# Patient Record
Sex: FEMALE | Race: WHITE | NOT HISPANIC OR LATINO | Employment: OTHER | URBAN - METROPOLITAN AREA
[De-identification: names, ages, dates, MRNs, and addresses within clinical notes are randomized per-mention and may not be internally consistent; named-entity substitution may affect disease eponyms.]

---

## 2018-07-17 ENCOUNTER — OFFICE VISIT (OUTPATIENT)
Dept: OBSTETRICS AND GYNECOLOGY | Facility: CLINIC | Age: 68
End: 2018-07-17

## 2018-07-17 VITALS
DIASTOLIC BLOOD PRESSURE: 85 MMHG | BODY MASS INDEX: 37.54 KG/M2 | SYSTOLIC BLOOD PRESSURE: 159 MMHG | HEIGHT: 62 IN | WEIGHT: 204 LBS | HEART RATE: 72 BPM

## 2018-07-17 DIAGNOSIS — N95.2 ATROPHIC VAGINITIS: ICD-10-CM

## 2018-07-17 DIAGNOSIS — N39.41 URGE INCONTINENCE: ICD-10-CM

## 2018-07-17 DIAGNOSIS — N81.10 PELVIC ORGAN PROLAPSE QUANTIFICATION STAGE 3 CYSTOCELE: Primary | ICD-10-CM

## 2018-07-17 DIAGNOSIS — N81.4 UTERINE PROLAPSE: ICD-10-CM

## 2018-07-17 DIAGNOSIS — N81.6 PELVIC ORGAN PROLAPSE QUANTIFICATION STAGE 1 RECTOCELE: ICD-10-CM

## 2018-07-17 PROCEDURE — 99203 OFFICE O/P NEW LOW 30 MIN: CPT | Performed by: OBSTETRICS & GYNECOLOGY

## 2018-07-17 RX ORDER — AMLODIPINE BESYLATE 5 MG/1
TABLET ORAL
COMMUNITY
Start: 2018-07-05 | End: 2021-08-05 | Stop reason: SDUPTHER

## 2018-07-17 RX ORDER — GLIPIZIDE 10 MG/1
TABLET, FILM COATED, EXTENDED RELEASE ORAL
COMMUNITY
Start: 2018-07-05 | End: 2021-08-05

## 2018-07-17 RX ORDER — OMEPRAZOLE 40 MG/1
CAPSULE, DELAYED RELEASE ORAL
COMMUNITY
Start: 2018-07-05 | End: 2021-08-05 | Stop reason: SDUPTHER

## 2018-07-17 RX ORDER — OXYBUTYNIN CHLORIDE 5 MG/1
5 TABLET ORAL 2 TIMES DAILY
Qty: 60 TABLET | Refills: 1 | Status: SHIPPED | OUTPATIENT
Start: 2018-07-17 | End: 2021-08-05

## 2018-07-17 RX ORDER — BENAZEPRIL HYDROCHLORIDE 40 MG/1
TABLET, FILM COATED ORAL
COMMUNITY
Start: 2018-07-05 | End: 2021-08-05 | Stop reason: SDUPTHER

## 2018-07-17 RX ORDER — ESTRADIOL 0.1 MG/G
CREAM VAGINAL
Qty: 42.5 G | Refills: 3 | Status: SHIPPED | OUTPATIENT
Start: 2018-07-17 | End: 2018-08-02 | Stop reason: SDUPTHER

## 2018-07-17 NOTE — PROGRESS NOTES
Cricket Centeno is a 67 y.o. female   CC: Pt here for recurrent yeast infection/ vaginal prolapse.  History of Present Illness  Pt here for recurrent yeast infection/ vaginal prolapse.  Patient has a long-standing history of type 2 diabetes.  She reports that she had been without her medication for about 1 year.  As result of this, her sugars have been uncontrolled.  She reports she had a yeast infection for about 1 month.  She has recently obtained insurance again and saw her primary care physician who treated her with fluconazole pills, and she subsequent has had retinal resolution of her yeast infection.  She denies any symptoms of a yeast infection at this time.  The patient reports that at the time of that visit her primary care provider had noted a vaginal prolapse.  The patient does feel that she's had a prolapse for at least a year.  She reports that at times she is felt the bulge in the vagina and has had to push the bulge back in.  She reports that she has also not been able to have intercourse for several years because of pain and discomfort.  She does report symptoms of vaginal and pelvic pressure as well as lower abdominal pain at times.  She does have issues with leakage of urine.  She reports it mostly as with sudden urge and that she often does not have time to get to the bathroom.  She denies problems with constipation or diarrhea.  She is postmenopausal.  She has a hiatal hernia, but has no other types of hernias.  She has had 5 vaginal deliveries all between 8 and 8-1/2 pounds.  She has never been a smoker.    Obstetric History       T5      L5     SAB0   TAB0   Ectopic0   Molar0   Multiple0   Live Births0       # Outcome Date GA Lbr Duglas/2nd Weight Sex Delivery Anes PTL Lv   7 AB            6 AB            5 Term      Vag-Spont      4 Term      Vag-Spont      3 Term      Vag-Spont      2 Term      Vag-Spont      1 Term      Vag-Spont           Past Medical History:  "  Diagnosis Date   • Diverticulitis of colon    • Hypertension    • Type 2 diabetes mellitus (CMS/HCC)      Social History   Substance Use Topics   • Smoking status: Never Smoker   • Smokeless tobacco: Never Used   • Alcohol use No     Family History   Problem Relation Age of Onset   • Diabetes Mother    • Breast cancer Sister    • Brain cancer Sister    • Colon cancer Sister      Past Surgical History:   Procedure Laterality Date   • BREAST BIOPSY     • CHOLECYSTECTOMY     • KNEE SURGERY     • TUBAL ABDOMINAL LIGATION       No Known Allergies       Current Outpatient Prescriptions:   •  amLODIPine (NORVASC) 5 MG tablet, , Disp: , Rfl:   •  benazepril (LOTENSIN) 40 MG tablet, , Disp: , Rfl:   •  glipiZIDE (GLUCOTROL XL) 10 MG 24 hr tablet, , Disp: , Rfl:   •  omeprazole (priLOSEC) 40 MG capsule, , Disp: , Rfl:     Review of Systems  General: No fever or chills  Constitutional: No weight loss or gain, no hair loss  HENT: No headache, no hearing loss, no tinnitus  Eyes: normal vision, no eye pain  Lungs: No cough, no shortness of breath  Heart: No chest pain, no palpitations  Abdomen: No nausea, vomiting, constipation or diarrhea  : No dysuria, no hematuria  Skin: No rashes  Lymph: No swelling  Neuro: No parathesia, no weakness  Psych: Normal though content, no hallucinations, no SI/HI    Objective   Physical Exam  Vitals:    07/17/18 1438   BP: 159/85   Pulse: 72   Weight: 92.5 kg (204 lb)   Height: 157.5 cm (62\")   Gen: No acute distress, awake and oriented times three  Abdomen: soft, nontender, non distended, normoactive bowel sounds  Pelvic:  Exam performed in the presence of a female chaperone  Patient has provided verbal consent to proceed with exam.  Normal external female genitalia, no lesions  Vagina: No blood or discharge, Loss of rugated noted as well as some mild irritation likely consistent with atrophy.  She is a negative supine cough stress test.  She has about a grade 2/3 cystocele.  There is about a " grade 1-2 uterine prolapse.  There is about a grade 1 rectocele.  There is no hardened stool in the vaginal vault.  There are no lesions or point tenderness.  Bimanual exam limited by body habitus  Cervix: No cervical motion tenderness, no lesions, no active bleeding, nonfriable  Uterus: Anteverted, normal size and shape, nontender  Adnexa: No masses or tenderness  Rectal: Deferred  Psych: Good judgement and insight, normal affect and mood    Assessment/Plan   Diagnoses and all orders for this visit:    Pelvic organ prolapse quantification stage 3 cystocele         -    On exam, the greatest level of prolapse seems to be the cystocele.  We discussed issues related to pelvic organ prolapse.  I explained that there is no emergent need for therapy for pelvic organ prolapse.  We discussed symptomatic relief.  I would recommend topical vaginal estradiol cream.  The risks, benefits, and alternatives were discussed at length.  As the next step in therapy we discussed use of a pessary.  We also discussed issues with surgical management.  I would certainly want to try pessary prior to proceeding with surgical management.  Patient also needs to be sure that she has her sugars under good control before attempting surgical management.  I believe the pessary would be the best first line option in this patient.  We will have her return for pessary fitting in about 2-4 weeks.  She should start her vaginal estradiol cream now.  Uterine prolapse    Pelvic organ prolapse quantification stage 1 rectocele    Urge incontinence  -     estradiol (ESTRACE VAGINAL) 0.1 MG/GM vaginal cream; Applied pea-sized amount to the vagina daily for 2 weeks, then 3 times per week  -     oxybutynin (DITROPAN) 5 MG tablet; Take 1 tablet by mouth 2 (Two) Times a Day.        -     Patient's description of her symptoms most consistent with urge incontinence, additionally no signs of stress incontinence on exam today.  We discussed therapeutic options.   Anticholinergics would be a good first line therapy.  We discussed typical side effect profile.  Instructions for use were given.  We will see how this has worked for her over the next 2-4 weeks.  Estradiol will also likely help with her urge incontinence.  Atrophic vaginitis  -     estradiol (ESTRACE VAGINAL) 0.1 MG/GM vaginal cream; Applied pea-sized amount to the vagina daily for 2 weeks, then 3 times per week

## 2018-07-17 NOTE — PATIENT INSTRUCTIONS
Pelvic Organ Prolapse  Pelvic organ prolapse is the stretching, bulging, or dropping of pelvic organs into an abnormal position. It happens when the muscles and tissues that surround and support pelvic structures are stretched or weak. Pelvic organ prolapse can involve:  · Vagina (vaginal prolapse).  · Uterus (uterine prolapse).  · Bladder (cystocele).  · Rectum (rectocele).  · Intestines (enterocele).    When organs other than the vagina are involved, they often bulge into the vagina or protrude from the vagina, depending on how severe the prolapse is.  What are the causes?  Causes of this condition include:  · Pregnancy, labor, and childbirth.  · Long-lasting (chronic) cough.  · Chronic constipation.  · Obesity.  · Past pelvic surgery.  · Aging. During and after menopause, a decreased production of the hormone estrogen can weaken pelvic ligaments and muscles.  · Consistently lifting more than 50 lb (23 kg).  · Buildup of fluid in the abdomen due to certain diseases and other conditions.    What are the signs or symptoms?  Symptoms of this condition include:  · Loss of bladder control when you cough, sneeze, strain, and exercise (stress incontinence). This may be worse immediately following childbirth, and it may gradually improve over time.  · Feeling pressure in your pelvis or vagina. This pressure may increase when you cough or when you are having a bowel movement.  · A bulge that protrudes from the opening of your vagina or against your vaginal wall. If your uterus protrudes through the opening of your vagina and rubs against your clothing, you may also experience soreness, ulcers, infection, pain, and bleeding.  · Increased effort to have a bowel movement or urinate.  · Pain in your low back.  · Pain, discomfort, or disinterest in sexual intercourse.  · Repeated bladder infections (urinary tract infections).  · Difficulty inserting or inability to insert a tampon or applicator.    In some people, this  condition does not cause any symptoms.  How is this diagnosed?  Your health care provider may perform an internal and external vaginal and rectal exam. During the exam, you may be asked to cough and strain while you are lying down, sitting, and standing up. Your health care provider will determine if other tests are required, such as bladder function tests.  How is this treated?  In most cases, this condition needs to be treated only if it produces symptoms. No treatment is guaranteed to correct the prolapse or relieve the symptoms completely. Treatment may include:  · Lifestyle changes, such as:  ? Avoiding drinking beverages that contain caffeine.  ? Increasing your intake of high-fiber foods. This can help to decrease constipation and straining during bowel movements.  ? Emptying your bladder at scheduled times (bladder training therapy). This can help to reduce or avoid urinary incontinence.  ? Losing weight if you are overweight or obese.  · Estrogen. Estrogen may help mild prolapse by increasing the strength and tone of pelvic floor muscles.  · Kegel exercises. These may help mild cases of prolapse by strengthening and tightening the muscles of the pelvic floor.  · Pessary insertion. A pessary is a soft, flexible device that is placed into your vagina by your health care provider to help support the vaginal walls and keep pelvic organs in place.  · Surgery. This is often the only form of treatment for severe prolapse. Different types of surgeries are available.    Follow these instructions at home:  · Wear a sanitary pad or absorbent product if you have urinary incontinence.  · Avoid heavy lifting and straining with exercise and work. Do not hold your breath when you perform mild to moderate lifting and exercise activities. Limit your activities as directed by your health care provider.  · Take medicines only as directed by your health care provider.  · Perform Kegel exercises as directed by your health care  provider.  · If you have a pessary, take care of it as directed by your health care provider.  Contact a health care provider if:  · Your symptoms interfere with your daily activities or sex life.  · You need medicine to help with the discomfort.  · You notice bleeding from the vagina that is not related to your period.  · You have a fever.  · You have pain or bleeding when you urinate.  · You have bleeding when you have a bowel movement.  · You lose urine when you have sex.  · You have chronic constipation.  · You have a pessary that falls out.  · You have vaginal discharge that has a bad smell.  · You have low abdominal pain or cramping that is unusual for you.  This information is not intended to replace advice given to you by your health care provider. Make sure you discuss any questions you have with your health care provider.  Document Released: 07/15/2015 Document Revised: 05/25/2017 Document Reviewed: 03/02/2015  [employer] Interactive Patient Education © 2018 [employer]  Urinary Incontinence  Urinary incontinence is the involuntary loss of urine from your bladder.  What are the causes?  There are many causes of urinary incontinence. They include:  · Medicines.  · Infections.  · Prostatic enlargement, leading to overflow of urine from your bladder.  · Surgery.  · Neurological diseases.  · Emotional factors.    What are the signs or symptoms?  Urinary Incontinence can be divided into four types:  1. Urge incontinence. Urge incontinence is the involuntary loss of urine before you have the opportunity to go to the bathroom. There is a sudden urge to void but not enough time to reach a bathroom.  2. Stress incontinence. Stress incontinence is the sudden loss of urine with any activity that forces urine to pass. It is commonly caused by anatomical changes to the pelvis and sphincter areas of your body.  3. Overflow incontinence. Overflow incontinence is the loss of urine from an obstructed opening to your  bladder. This results in a backup of urine and a resultant buildup of pressure within the bladder. When the pressure within the bladder exceeds the closing pressure of the sphincter, the urine overflows, which causes incontinence, similar to water overflowing a dam.  4. Total incontinence. Total incontinence is the loss of urine as a result of the inability to store urine within your bladder.    How is this diagnosed?  Evaluating the cause of incontinence may require:  · A thorough and complete medical and obstetric history.  · A complete physical exam.  · Laboratory tests such as a urine culture and sensitivities.    When additional tests are indicated, they can include:  · An ultrasound exam.  · Kidney and bladder X-rays.  · Cystoscopy. This is an exam of the bladder using a narrow scope.  · Urodynamic testing to test the nerve function to the bladder and sphincter areas.    How is this treated?  Treatment for urinary incontinence depends on the cause:  · For urge incontinence caused by a bacterial infection, antibiotics will be prescribed. If the urge incontinence is related to medicines you take, your health care provider may have you change the medicine.  · For stress incontinence, surgery to re-establish anatomical support to the bladder or sphincter, or both, will often correct the condition.  · For overflow incontinence caused by an enlarged prostate, an operation to open the channel through the enlarged prostate will allow the flow of urine out of the bladder. In women with fibroids, a hysterectomy may be recommended.  · For total incontinence, surgery on your urinary sphincter may help. An artificial urinary sphincter (an inflatable cuff placed around the urethra) may be required. In women who have developed a hole-like passage between their bladder and vagina (vesicovaginal fistula), surgery to close the fistula often is required.    Follow these instructions at home:  · Normal daily hygiene and the use  of pads or adult diapers that are changed regularly will help prevent odors and skin damage.  · Avoid caffeine. It can overstimulate your bladder.  · Use the bathroom regularly. Try about every 2-3 hours to go to the bathroom, even if you do not feel the need to do so. Take time to empty your bladder completely. After urinating, wait a minute. Then try to urinate again.  · For causes involving nerve dysfunction, keep a log of the medicines you take and a journal of the times you go to the bathroom.  Contact a health care provider if:  · You experience worsening of pain instead of improvement in pain after your procedure.  · Your incontinence becomes worse instead of better.  Get help right away if:  · You experience fever or shaking chills.  · You are unable to pass your urine.  · You have redness spreading into your groin or down into your thighs.  This information is not intended to replace advice given to you by your health care provider. Make sure you discuss any questions you have with your health care provider.  Document Released: 01/25/2006 Document Revised: 07/28/2017 Document Reviewed: 05/27/2014  [employer] Interactive Patient Education © 2018 [employer]

## 2018-08-02 DIAGNOSIS — N39.41 URGE INCONTINENCE: ICD-10-CM

## 2018-08-02 DIAGNOSIS — N95.2 ATROPHIC VAGINITIS: ICD-10-CM

## 2018-08-02 RX ORDER — ESTRADIOL 0.1 MG/G
CREAM VAGINAL
Qty: 42.5 G | Refills: 3 | Status: SHIPPED | OUTPATIENT
Start: 2018-08-02 | End: 2018-08-16 | Stop reason: CLARIF

## 2018-08-16 DIAGNOSIS — N95.2 ATROPHIC VAGINITIS: Primary | ICD-10-CM

## 2018-08-16 RX ORDER — ESTRADIOL 0.1 MG/G
2 CREAM VAGINAL 3 TIMES WEEKLY
Qty: 42.5 G | Refills: 3 | Status: SHIPPED | OUTPATIENT
Start: 2018-08-17 | End: 2021-10-30

## 2018-08-16 NOTE — TELEPHONE ENCOUNTER
I received a letter from Medicare that estradiol is nonformulary.  After wasting about 30 minutes in my office today on the phone with 2 different people at [employer], to the detriment of my office staff and the remaining patients in my office, I was able to determine that the brand of Estrace is covered and is here 3 as opposed to the generic equivalent estradiol which is nonformulary.  I sent in a new prescription of Estrace.  It will need to be dispense as written for the brand and not the generic equivalent.

## 2019-02-27 ENCOUNTER — OFFICE VISIT CONVERTED (OUTPATIENT)
Dept: NEUROSURGERY | Facility: CLINIC | Age: 69
End: 2019-02-27
Attending: NEUROLOGICAL SURGERY

## 2019-03-06 ENCOUNTER — HOSPITAL ENCOUNTER (OUTPATIENT)
Dept: PREADMISSION TESTING | Facility: HOSPITAL | Age: 69
Discharge: HOME | End: 2019-03-06
Attending: NEUROLOGICAL SURGERY

## 2019-03-06 LAB
ANION GAP SERPL CALC-SCNC: 15 MMOL/L (ref 8–19)
BUN SERPL-MCNC: 14 MG/DL (ref 5–25)
BUN/CREAT SERPL: 18 {RATIO} (ref 6–20)
CALCIUM SERPL-MCNC: 10.3 MG/DL (ref 8.7–10.4)
CHLORIDE SERPL-SCNC: 102 MMOL/L (ref 99–111)
CONV CO2: 29 MMOL/L (ref 22–32)
CREAT UR-MCNC: 0.78 MG/DL (ref 0.5–0.9)
GFR SERPLBLD BASED ON 1.73 SQ M-ARVRAT: >60 ML/MIN/{1.73_M2}
GLUCOSE SERPL-MCNC: 335 MG/DL (ref 65–99)
OSMOLALITY SERPL CALC.SUM OF ELEC: 306 MOSM/KG (ref 273–304)
POTASSIUM SERPL-SCNC: 5.2 MMOL/L (ref 3.5–5.3)
SODIUM SERPL-SCNC: 141 MMOL/L (ref 135–147)

## 2019-03-25 ENCOUNTER — HOSPITAL ENCOUNTER (OUTPATIENT)
Dept: GENERAL RADIOLOGY | Facility: HOSPITAL | Age: 69
Discharge: HOME | End: 2019-03-25
Attending: NURSE PRACTITIONER

## 2019-04-02 ENCOUNTER — HOSPITAL ENCOUNTER (OUTPATIENT)
Dept: GENERAL RADIOLOGY | Facility: HOSPITAL | Age: 69
Discharge: HOME | End: 2019-04-02
Attending: NEUROLOGICAL SURGERY

## 2019-04-03 ENCOUNTER — OFFICE VISIT CONVERTED (OUTPATIENT)
Dept: NEUROSURGERY | Facility: CLINIC | Age: 69
End: 2019-04-03
Attending: PHYSICIAN ASSISTANT

## 2019-04-03 ENCOUNTER — CONVERSION ENCOUNTER (OUTPATIENT)
Dept: NEUROLOGY | Facility: CLINIC | Age: 69
End: 2019-04-03

## 2019-05-09 ENCOUNTER — OFFICE VISIT CONVERTED (OUTPATIENT)
Dept: NEUROSURGERY | Facility: CLINIC | Age: 69
End: 2019-05-09
Attending: PHYSICIAN ASSISTANT

## 2019-05-23 ENCOUNTER — HOSPITAL ENCOUNTER (OUTPATIENT)
Dept: CARDIOLOGY | Facility: HOSPITAL | Age: 69
Discharge: HOME | End: 2019-05-23
Attending: PHYSICIAN ASSISTANT

## 2019-05-28 ENCOUNTER — HOSPITAL ENCOUNTER (OUTPATIENT)
Dept: OTHER | Facility: HOSPITAL | Age: 69
Discharge: HOME | End: 2019-05-28
Attending: PHYSICIAN ASSISTANT

## 2019-05-29 ENCOUNTER — OFFICE VISIT CONVERTED (OUTPATIENT)
Dept: NEUROSURGERY | Facility: CLINIC | Age: 69
End: 2019-05-29
Attending: NEUROLOGICAL SURGERY

## 2019-06-06 ENCOUNTER — OFFICE VISIT CONVERTED (OUTPATIENT)
Dept: NEUROLOGY | Facility: CLINIC | Age: 69
End: 2019-06-06
Attending: PSYCHIATRY & NEUROLOGY

## 2019-06-19 ENCOUNTER — OFFICE VISIT CONVERTED (OUTPATIENT)
Dept: NEUROSURGERY | Facility: CLINIC | Age: 69
End: 2019-06-19
Attending: PHYSICIAN ASSISTANT

## 2019-06-19 ENCOUNTER — CONVERSION ENCOUNTER (OUTPATIENT)
Dept: NEUROLOGY | Facility: CLINIC | Age: 69
End: 2019-06-19

## 2019-09-16 ENCOUNTER — OFFICE VISIT CONVERTED (OUTPATIENT)
Dept: NEUROSURGERY | Facility: CLINIC | Age: 69
End: 2019-09-16
Attending: NEUROLOGICAL SURGERY

## 2019-09-16 ENCOUNTER — HOSPITAL ENCOUNTER (OUTPATIENT)
Dept: OTHER | Facility: HOSPITAL | Age: 69
Discharge: HOME | End: 2019-09-16
Attending: NEUROLOGICAL SURGERY

## 2020-05-21 ENCOUNTER — CONVERSION ENCOUNTER (OUTPATIENT)
Dept: FAMILY MEDICINE CLINIC | Facility: CLINIC | Age: 70
End: 2020-05-21

## 2020-05-21 ENCOUNTER — HOSPITAL ENCOUNTER (OUTPATIENT)
Dept: LAB | Facility: HOSPITAL | Age: 70
Discharge: HOME | End: 2020-05-21
Attending: NURSE PRACTITIONER

## 2020-05-21 ENCOUNTER — OFFICE VISIT CONVERTED (OUTPATIENT)
Dept: FAMILY MEDICINE CLINIC | Facility: CLINIC | Age: 70
End: 2020-05-21
Attending: NURSE PRACTITIONER

## 2020-05-21 LAB
ALBUMIN SERPL-MCNC: 4.2 G/DL (ref 3.5–5)
ALBUMIN/GLOB SERPL: 1.1 {RATIO} (ref 1.4–2.6)
ALP SERPL-CCNC: 183 U/L (ref 43–160)
ALT SERPL-CCNC: 41 U/L (ref 10–40)
ANION GAP SERPL CALC-SCNC: 19 MMOL/L (ref 8–19)
AST SERPL-CCNC: 54 U/L (ref 15–50)
BASOPHILS # BLD AUTO: 0.06 10*3/UL (ref 0–0.2)
BASOPHILS NFR BLD AUTO: 0.8 % (ref 0–3)
BILIRUB SERPL-MCNC: 0.81 MG/DL (ref 0.2–1.3)
BUN SERPL-MCNC: 11 MG/DL (ref 5–25)
BUN/CREAT SERPL: 16 {RATIO} (ref 6–20)
CALCIUM SERPL-MCNC: 9.6 MG/DL (ref 8.7–10.4)
CHLORIDE SERPL-SCNC: 97 MMOL/L (ref 99–111)
CHOLEST SERPL-MCNC: 143 MG/DL (ref 107–200)
CHOLEST/HDLC SERPL: 2.3 {RATIO} (ref 3–6)
CONV ABS IMM GRAN: 0.02 10*3/UL (ref 0–0.2)
CONV CO2: 24 MMOL/L (ref 22–32)
CONV CREATININE URINE, RANDOM: 156.5 MG/DL (ref 10–300)
CONV IMMATURE GRAN: 0.3 % (ref 0–1.8)
CONV MICROALBUM.,U,RANDOM: 16.9 MG/L (ref 0–20)
CONV TOTAL PROTEIN: 8 G/DL (ref 6.3–8.2)
CREAT UR-MCNC: 0.7 MG/DL (ref 0.5–0.9)
DEPRECATED RDW RBC AUTO: 46.8 FL (ref 36.4–46.3)
EOSINOPHIL # BLD AUTO: 0.1 10*3/UL (ref 0–0.7)
EOSINOPHIL # BLD AUTO: 1.3 % (ref 0–7)
ERYTHROCYTE [DISTWIDTH] IN BLOOD BY AUTOMATED COUNT: 13.5 % (ref 11.7–14.4)
EST. AVERAGE GLUCOSE BLD GHB EST-MCNC: 258 MG/DL
GFR SERPLBLD BASED ON 1.73 SQ M-ARVRAT: >60 ML/MIN/{1.73_M2}
GLOBULIN UR ELPH-MCNC: 3.8 G/DL (ref 2–3.5)
GLUCOSE SERPL-MCNC: 291 MG/DL (ref 65–99)
HBA1C MFR BLD: 10.6 % (ref 3.5–5.7)
HCT VFR BLD AUTO: 48.8 % (ref 37–47)
HDLC SERPL-MCNC: 61 MG/DL (ref 40–60)
HGB BLD-MCNC: 15.7 G/DL (ref 12–16)
LDLC SERPL CALC-MCNC: 62 MG/DL (ref 70–100)
LYMPHOCYTES # BLD AUTO: 3.01 10*3/UL (ref 1–5)
LYMPHOCYTES NFR BLD AUTO: 38.3 % (ref 20–45)
MCH RBC QN AUTO: 30.1 PG (ref 27–31)
MCHC RBC AUTO-ENTMCNC: 32.2 G/DL (ref 33–37)
MCV RBC AUTO: 93.5 FL (ref 81–99)
MICROALBUMIN/CREAT UR: 10.8 MG/G{CRE} (ref 0–35)
MONOCYTES # BLD AUTO: 0.63 10*3/UL (ref 0.2–1.2)
MONOCYTES NFR BLD AUTO: 8 % (ref 3–10)
NEUTROPHILS # BLD AUTO: 4.03 10*3/UL (ref 2–8)
NEUTROPHILS NFR BLD AUTO: 51.3 % (ref 30–85)
NRBC CBCN: 0 % (ref 0–0.7)
OSMOLALITY SERPL CALC.SUM OF ELEC: 292 MOSM/KG (ref 273–304)
PLATELET # BLD AUTO: 156 10*3/UL (ref 130–400)
PMV BLD AUTO: 13 FL (ref 9.4–12.3)
POTASSIUM SERPL-SCNC: 4 MMOL/L (ref 3.5–5.3)
RBC # BLD AUTO: 5.22 10*6/UL (ref 4.2–5.4)
SODIUM SERPL-SCNC: 136 MMOL/L (ref 135–147)
TRIGL SERPL-MCNC: 100 MG/DL (ref 40–150)
VLDLC SERPL-MCNC: 20 MG/DL (ref 5–37)
WBC # BLD AUTO: 7.85 10*3/UL (ref 4.8–10.8)

## 2020-06-10 ENCOUNTER — HOSPITAL ENCOUNTER (OUTPATIENT)
Dept: LAB | Facility: HOSPITAL | Age: 70
Discharge: HOME | End: 2020-06-10
Attending: NURSE PRACTITIONER

## 2020-06-11 LAB
CONV HEPATITIS B SURFACE AG W CONFIRMATION RE: NEGATIVE
HAV IGM SERPL QL IA: NEGATIVE
HBV CORE IGM SERPL QL IA: NEGATIVE
HCV AB SER DONR QL: <0.1 S/CO RATIO (ref 0–0.9)

## 2020-07-09 ENCOUNTER — HOSPITAL ENCOUNTER (OUTPATIENT)
Dept: GENERAL RADIOLOGY | Facility: HOSPITAL | Age: 70
Discharge: HOME | End: 2020-07-09
Attending: NURSE PRACTITIONER

## 2020-09-29 ENCOUNTER — OFFICE VISIT CONVERTED (OUTPATIENT)
Dept: FAMILY MEDICINE CLINIC | Facility: CLINIC | Age: 70
End: 2020-09-29
Attending: NURSE PRACTITIONER

## 2020-09-29 ENCOUNTER — HOSPITAL ENCOUNTER (OUTPATIENT)
Dept: FAMILY MEDICINE CLINIC | Facility: CLINIC | Age: 70
Discharge: HOME | End: 2020-09-29
Attending: NURSE PRACTITIONER

## 2020-09-29 ENCOUNTER — CONVERSION ENCOUNTER (OUTPATIENT)
Dept: FAMILY MEDICINE CLINIC | Facility: CLINIC | Age: 70
End: 2020-09-29

## 2020-10-01 LAB — BACTERIA UR CULT: NORMAL

## 2020-10-14 ENCOUNTER — OFFICE VISIT CONVERTED (OUTPATIENT)
Dept: FAMILY MEDICINE CLINIC | Facility: CLINIC | Age: 70
End: 2020-10-14
Attending: NURSE PRACTITIONER

## 2020-11-25 ENCOUNTER — CONVERSION ENCOUNTER (OUTPATIENT)
Dept: FAMILY MEDICINE CLINIC | Facility: CLINIC | Age: 70
End: 2020-11-25

## 2020-11-25 ENCOUNTER — HOSPITAL ENCOUNTER (OUTPATIENT)
Dept: LAB | Facility: HOSPITAL | Age: 70
Discharge: HOME | End: 2020-11-25
Attending: NURSE PRACTITIONER

## 2020-11-25 ENCOUNTER — OFFICE VISIT CONVERTED (OUTPATIENT)
Dept: FAMILY MEDICINE CLINIC | Facility: CLINIC | Age: 70
End: 2020-11-25
Attending: NURSE PRACTITIONER

## 2020-11-25 ENCOUNTER — HOSPITAL ENCOUNTER (OUTPATIENT)
Dept: GENERAL RADIOLOGY | Facility: HOSPITAL | Age: 70
Discharge: HOME | End: 2020-11-25
Attending: NURSE PRACTITIONER

## 2020-11-25 LAB
ALBUMIN SERPL-MCNC: 4 G/DL (ref 3.5–5)
ALBUMIN/GLOB SERPL: 1.1 {RATIO} (ref 1.4–2.6)
ALP SERPL-CCNC: 167 U/L (ref 43–160)
ALT SERPL-CCNC: 26 U/L (ref 10–40)
ANION GAP SERPL CALC-SCNC: 14 MMOL/L (ref 8–19)
AST SERPL-CCNC: 35 U/L (ref 15–50)
BASOPHILS # BLD AUTO: 0.05 10*3/UL (ref 0–0.2)
BASOPHILS NFR BLD AUTO: 0.8 % (ref 0–3)
BILIRUB SERPL-MCNC: 0.58 MG/DL (ref 0.2–1.3)
BUN SERPL-MCNC: 14 MG/DL (ref 5–25)
BUN/CREAT SERPL: 16 {RATIO} (ref 6–20)
CALCIUM SERPL-MCNC: 9.5 MG/DL (ref 8.7–10.4)
CHLORIDE SERPL-SCNC: 95 MMOL/L (ref 99–111)
CONV ABS IMM GRAN: 0.02 10*3/UL (ref 0–0.2)
CONV CO2: 27 MMOL/L (ref 22–32)
CONV IMMATURE GRAN: 0.3 % (ref 0–1.8)
CONV TOTAL PROTEIN: 7.7 G/DL (ref 6.3–8.2)
CREAT UR-MCNC: 0.88 MG/DL (ref 0.5–0.9)
DEPRECATED RDW RBC AUTO: 45.1 FL (ref 36.4–46.3)
EOSINOPHIL # BLD AUTO: 0.08 10*3/UL (ref 0–0.7)
EOSINOPHIL # BLD AUTO: 1.3 % (ref 0–7)
ERYTHROCYTE [DISTWIDTH] IN BLOOD BY AUTOMATED COUNT: 13.2 % (ref 11.7–14.4)
GFR SERPLBLD BASED ON 1.73 SQ M-ARVRAT: >60 ML/MIN/{1.73_M2}
GLOBULIN UR ELPH-MCNC: 3.7 G/DL (ref 2–3.5)
GLUCOSE SERPL-MCNC: 298 MG/DL (ref 65–99)
HCT VFR BLD AUTO: 45 % (ref 37–47)
HGB BLD-MCNC: 14.6 G/DL (ref 12–16)
LYMPHOCYTES # BLD AUTO: 2.55 10*3/UL (ref 1–5)
LYMPHOCYTES NFR BLD AUTO: 40 % (ref 20–45)
MCH RBC QN AUTO: 30.2 PG (ref 27–31)
MCHC RBC AUTO-ENTMCNC: 32.4 G/DL (ref 33–37)
MCV RBC AUTO: 93.2 FL (ref 81–99)
MONOCYTES # BLD AUTO: 0.72 10*3/UL (ref 0.2–1.2)
MONOCYTES NFR BLD AUTO: 11.3 % (ref 3–10)
NEUTROPHILS # BLD AUTO: 2.95 10*3/UL (ref 2–8)
NEUTROPHILS NFR BLD AUTO: 46.3 % (ref 30–85)
NRBC CBCN: 0 % (ref 0–0.7)
OSMOLALITY SERPL CALC.SUM OF ELEC: 286 MOSM/KG (ref 273–304)
PLATELET # BLD AUTO: 156 10*3/UL (ref 130–400)
PMV BLD AUTO: 12.3 FL (ref 9.4–12.3)
POTASSIUM SERPL-SCNC: 4.2 MMOL/L (ref 3.5–5.3)
RBC # BLD AUTO: 4.83 10*6/UL (ref 4.2–5.4)
SODIUM SERPL-SCNC: 132 MMOL/L (ref 135–147)
WBC # BLD AUTO: 6.37 10*3/UL (ref 4.8–10.8)

## 2020-12-03 ENCOUNTER — HOSPITAL ENCOUNTER (OUTPATIENT)
Dept: GENERAL RADIOLOGY | Facility: HOSPITAL | Age: 70
Discharge: HOME | End: 2020-12-03
Attending: NURSE PRACTITIONER

## 2021-01-21 ENCOUNTER — HOSPITAL ENCOUNTER (OUTPATIENT)
Dept: GENERAL RADIOLOGY | Facility: HOSPITAL | Age: 71
Discharge: HOME | End: 2021-01-21
Attending: NURSE PRACTITIONER

## 2021-01-21 LAB
CREAT BLD-MCNC: 0.6 MG/DL (ref 0.6–1.4)
GFR SERPLBLD BASED ON 1.73 SQ M-ARVRAT: >60 ML/MIN/{1.73_M2}

## 2021-01-26 ENCOUNTER — OFFICE VISIT CONVERTED (OUTPATIENT)
Dept: FAMILY MEDICINE CLINIC | Facility: CLINIC | Age: 71
End: 2021-01-26
Attending: NURSE PRACTITIONER

## 2021-02-04 ENCOUNTER — HOSPITAL ENCOUNTER (OUTPATIENT)
Dept: LAB | Facility: HOSPITAL | Age: 71
Discharge: HOME | End: 2021-02-04
Attending: NURSE PRACTITIONER

## 2021-02-06 LAB
ALBUMIN SERPL-MCNC: 4 G/DL (ref 3.5–5)
ALBUMIN/GLOB SERPL: 0.9 {RATIO} (ref 1.4–2.6)
ALP SERPL-CCNC: 214 U/L (ref 43–160)
ALT SERPL-CCNC: 32 U/L (ref 10–40)
ANION GAP SERPL CALC-SCNC: 14 MMOL/L (ref 8–19)
AST SERPL-CCNC: 39 U/L (ref 15–50)
BILIRUB SERPL-MCNC: 0.69 MG/DL (ref 0.2–1.3)
BUN SERPL-MCNC: 13 MG/DL (ref 5–25)
BUN/CREAT SERPL: 18 {RATIO} (ref 6–20)
CALCIUM SERPL-MCNC: 10.4 MG/DL (ref 8.7–10.4)
CHLORIDE SERPL-SCNC: 95 MMOL/L (ref 99–111)
CHOLEST SERPL-MCNC: 148 MG/DL (ref 107–200)
CHOLEST/HDLC SERPL: 2.1 {RATIO} (ref 3–6)
CONV CO2: 30 MMOL/L (ref 22–32)
CONV CREATININE URINE, RANDOM: 167.9 MG/DL (ref 10–300)
CONV MICROALBUM.,U,RANDOM: 50.1 MG/L (ref 0–20)
CONV TOTAL PROTEIN: 8.3 G/DL (ref 6.3–8.2)
CREAT UR-MCNC: 0.72 MG/DL (ref 0.5–0.9)
EST. AVERAGE GLUCOSE BLD GHB EST-MCNC: 280 MG/DL
GFR SERPLBLD BASED ON 1.73 SQ M-ARVRAT: >60 ML/MIN/{1.73_M2}
GLOBULIN UR ELPH-MCNC: 4.3 G/DL (ref 2–3.5)
GLUCOSE SERPL-MCNC: 283 MG/DL (ref 65–99)
HBA1C MFR BLD: 11.4 % (ref 3.5–5.7)
HDLC SERPL-MCNC: 71 MG/DL (ref 40–60)
LDLC SERPL CALC-MCNC: 58 MG/DL (ref 70–100)
MICROALBUMIN/CREAT UR: 29.8 MG/G{CRE} (ref 0–35)
OSMOLALITY SERPL CALC.SUM OF ELEC: 288 MOSM/KG (ref 273–304)
POTASSIUM SERPL-SCNC: 4.5 MMOL/L (ref 3.5–5.3)
SODIUM SERPL-SCNC: 134 MMOL/L (ref 135–147)
TRIGL SERPL-MCNC: 97 MG/DL (ref 40–150)
VLDLC SERPL-MCNC: 19 MG/DL (ref 5–37)

## 2021-02-25 ENCOUNTER — OFFICE VISIT CONVERTED (OUTPATIENT)
Dept: PODIATRY | Facility: CLINIC | Age: 71
End: 2021-02-25
Attending: PODIATRIST

## 2021-03-09 ENCOUNTER — OFFICE VISIT CONVERTED (OUTPATIENT)
Dept: GASTROENTEROLOGY | Facility: CLINIC | Age: 71
End: 2021-03-09
Attending: NURSE PRACTITIONER

## 2021-03-15 ENCOUNTER — CONVERSION ENCOUNTER (OUTPATIENT)
Dept: FAMILY MEDICINE CLINIC | Facility: CLINIC | Age: 71
End: 2021-03-15

## 2021-03-15 ENCOUNTER — OFFICE VISIT CONVERTED (OUTPATIENT)
Dept: FAMILY MEDICINE CLINIC | Facility: CLINIC | Age: 71
End: 2021-03-15
Attending: NURSE PRACTITIONER

## 2021-03-29 ENCOUNTER — HOSPITAL ENCOUNTER (OUTPATIENT)
Dept: LAB | Facility: HOSPITAL | Age: 71
Discharge: HOME | End: 2021-03-29
Attending: NURSE PRACTITIONER

## 2021-03-29 LAB
ALBUMIN SERPL-MCNC: 3.9 G/DL (ref 3.5–5)
ALP SERPL-CCNC: 211 U/L (ref 43–160)
ALT SERPL-CCNC: 34 U/L (ref 10–40)
AST SERPL-CCNC: 34 U/L (ref 15–50)
BILIRUB SERPL-MCNC: 0.48 MG/DL (ref 0.2–1.3)
CONV AFP: 4.1 NG/ML (ref 0–8.7)
CONV BILI, CONJUGATED: <0.2 MG/DL (ref 0–0.6)
CONV TOTAL PROTEIN: 7.6 G/DL (ref 6.3–8.2)
CONV UNCONJUGATED BILIRUBIN: 0.3 MG/DL (ref 0–1.1)
INR PPP: 0.96 (ref 2–3)
PROTHROMBIN TIME: 10.6 S (ref 9.4–12)

## 2021-03-30 LAB
ALBUMIN SERPL-MCNC: 3.6 G/DL (ref 2.9–4.4)
ALBUMIN/GLOB SERPL: 0.9 {RATIO} (ref 0.7–1.7)
ALPHA2 GLOB SERPL ELPH-MCNC: 0.9 G/DL (ref 0.4–1)
BETA GLOBULIN: 1.1 G/DL (ref 0.7–1.3)
CERULOPLASMIN SERPL-MCNC: 27.3 MG/DL (ref 19–39)
CONV ALPHA-1-GLOBULIN: 0.3 G/DL (ref 0–0.4)
CONV IMMUNOGLOBULIN G (IGG): 1402 MG/DL (ref 586–1602)
CONV IMMUNOGLOBULIN M (IGM): 117 MG/DL (ref 26–217)
CONV PE INTERPRETATION: NORMAL
CONV PE NOTE: NORMAL
CONV TOTAL PROTEIN: 7.4 G/DL (ref 6–8.5)
DEPRECATED MITOCHONDRIA M2 IGG SER-ACNC: <20 UNITS (ref 0–20)
FERRITIN SERPL-MCNC: 289 NG/ML (ref 10–200)
GAMMA GLOB SERPL ELPH-MCNC: 1.5 G/DL (ref 0.4–1.8)
GLOBULIN UR ELPH-MCNC: 3.8 G/DL (ref 2.2–3.9)
IGA SERPL-MCNC: 319 MG/DL (ref 87–352)
IRON SATN MFR SERPL: 27 % (ref 20–55)
IRON SERPL-MCNC: 87 UG/DL (ref 60–170)
M-SPIKE: NORMAL G/DL
PROT PATTERN SERPL IFE-IMP: NORMAL
SMOOTH MUSCLE F-ACTIN AB IGG: 3 UNITS (ref 0–19)
TIBC SERPL-MCNC: 317 UG/DL (ref 245–450)
TRANSFERRIN SERPL-MCNC: 222 MG/DL (ref 250–380)

## 2021-03-31 LAB
CENTROMERE AB TITR SER IF: 0.5 [IU]/ML (ref 0–7)
DSDNA AB SER-ACNC: NEGATIVE [IU]/ML
ENA AB SER IA-ACNC: ABNORMAL {RATIO}
ENA SCL70 AB SER QL IA: 1.6 [IU]/ML (ref 0–7)
JO-1 (WB)*: <0.3 [IU]/ML (ref 0–7)
RNP: 0.4 [IU]/ML (ref 0–7)
RNP: 2.6 [IU]/ML (ref 0–5)
SMI: 2.6 [IU]/ML (ref 0–7)
SSA (RO) (ENA) AB, IGG: 0.5 [IU]/ML (ref 0–7)
SSB (LA) ENA AB, IGG: 0.5 [IU]/ML (ref 0–7)

## 2021-04-01 LAB
CONV NASH FIBROSURE: NORMAL
COPPER SERPL-MCNC: 121 UG/DL (ref 80–158)

## 2021-04-05 LAB
A1AT SERPL-MCNC: 153 MG/DL (ref 101–187)
PHENOTYPE: NORMAL

## 2021-04-09 ENCOUNTER — HOSPITAL ENCOUNTER (OUTPATIENT)
Dept: PREADMISSION TESTING | Facility: HOSPITAL | Age: 71
Discharge: HOME | End: 2021-04-09
Attending: INTERNAL MEDICINE

## 2021-04-09 LAB — SARS-COV-2 RNA SPEC QL NAA+PROBE: NOT DETECTED

## 2021-05-10 NOTE — H&P
"   History and Physical      Patient Name: Melania Centeno   Patient ID: [id number]   Sex: Female   YOB: 1950    Primary Care Provider: Kelsey ATKINSON   Referring Provider: Kelsey ATKINSON    Visit Date: February 25, 2021    Provider: Jeramy Casey DPM   Location: [facility]   Location Address: [address] [place] [address]  [id number]   Location Phone: [phone]          Chief Complaint  · Diabetic Foot Evaluation      History Of Present Illness  Melania Centeno presents to the office today as a new patient for a diabetic foot evaluation. On referral from Kelsey ATKINSON   Patient reports that she is a diabetic currently controlling diabetes with insulin      New, Established, New Problem: new   Location: bilateral feet  Duration:  Onset: gradual  Nature: IDDM  Stable, worsening, improving: stable  Aggravating factors:  Previous Treatment: insulin    Patient denies any fevers, chills, nausea, vomiting, shortness of breathe, nor any other constitutional signs nor symptoms.    Pt states their most recent blood glucose reading was \"400 - something\".  She states her PCP is aware of this.       Past Medical History  Cervical spinal stenosis; Cervicalgia; Diabetes mellitus type 2, noninsulin dependent; Foot pain; Fusion of spine of cervical region; GERD (gastroesophageal reflux disease); Hyperlipidemia; Hypertension, Benign Essential         Past Surgical History  Cervical Fusion; Cholecystecomy; Knee surgery         Medication List  amlodipine 5 mg oral tablet; atorvastatin 10 mg oral tablet; benazepril 40 mg oral tablet; fluconazole 150 mg oral tablet; glipizide 10 mg oral tablet; hydrocodone-acetaminophen 7.5-325 mg/15 mL oral solution; omeprazole 40 mg oral capsule,delayed release(DR/EC); pen needle, diabetic 32 gauge x 3/16\" miscellaneous needle; tizanidine 4 mg oral capsule; Tresiba FlexTouch U-100 100 unit/mL (3 mL) subcutaneous insulin pen         Allergy " "List  NO KNOWN DRUG ALLERGIES       Allergies Reconciled  Family Medical History  Family history of cancer; Family history of heart disease; Family history of diabetes mellitus         Social History  Alcohol (Never); lives with spouse; ; Retired; Tobacco (Never)         Immunizations  Name Date Admin   Influenza 11/25/2020         Review of Systems  · Constitutional  o Denies  o : fatigue, night sweats  · Eyes  o Denies  o : double vision, blurred vision  · HENT  o Denies  o : vertigo, recent head injury  · Cardiovascular  o Denies  o : chest pain, irregular heart beats  · Respiratory  o Denies  o : shortness of breath, productive cough  · Gastrointestinal  o Denies  o : nausea, vomiting  · Genitourinary  o Denies  o : dysuria, urinary retention  · Integument  o Denies  o : hair growth change, new skin lesions  · Neurologic  o Denies  o : altered mental status, seizures  · Musculoskeletal  o Denies  o : joint swelling, limitation of motion  · Endocrine  o Denies  o : cold intolerance, heat intolerance  · Heme-Lymph  o Denies  o : petechiae, lymph node enlargement or tenderness  · Allergic-Immunologic  o Denies  o : frequent illnesses      Vitals  Date Time BP Position Site L\R Cuff Size HR RR TEMP (F) WT  HT  BMI kg/m2 BSA m2 O2 Sat FR L/min FiO2 HC       02/25/2021 02:33 /71 Sitting    79 - R  97.6 190lbs 0oz 5'  1\" 35.9 1.93 96 %      02/25/2021 02:33 /73 Sitting                       Physical Examination  · Constitutional  o Appearance  o : awake, alert, well-developed, and well nourished   · Cardiovascular  o Peripheral Vascular System  o :   § Extremities  § : no edema noted  · Musculoskeletal  o Extremeties/Joint  o : Lower extremity muscle strength and range of motion is equal and symmetrical bilaterally. The knees are noted to be in normal alignment. Ankle alignment and range of motion is normal and foot structure is normal. Subtalar, metatarsal and metatarsal-phalangeal joint range of " motion is noted to be within normal limits. The digits of both feet are in normal alignment. The gait is normal.  · Left DM Foot Exam  o Sensation  o : Sharp/dull sensation is within normal limits. [place]-Carleen 5.07 monofilament intact to all assessed areas.   o Visual Inspection  o : Skin is noted to have normal texture and turgor, with no excrescences noted. The toenails are noted to be without disease  o Vascular  o : palpable dorsalis pedis and posterir tibialis pulses present, normal capillary refill  · Right DM Foot Exam  o Sensation  o : Sharp/dull sensation is within normal limits. [place]-Carleen 5.07 monofilament intact to all assessed areas.   o Visual Inspection  o : Skin is noted to have normal texture and turgor, with no excrescences noted. The toenails are noted to be without disease  o Vascular  o : palpable dorsalis pedis and posterir tibialis pulses present, normal capillary refill          Assessment  · Diabetes     250.00/E11.9      Plan  · Orders  o Diabetic Foot (Motor and Sensory) Exam Completed [facility] (, , 2028F) - - 02/25/2021  · Medications  o Medications have been Reconciled  o Transition of Care or Provider Policy  · Instructions  o I have discussed the findings of this evaluation with the patient. The discussion included a complete verbal explanation of any changes in the examination results, diagnosis, and the current treatment plan. A schedule for future care needs was explained. If any questions should arise after returning home, I have encouraged the patient to feel free to contact Dr. Casey. The patient states understanding and agreement with this plan.   o Pt to monitor for problems and to contact Dr. Casey for follow-up should such signs occur. Patient states understanding and agreement with this plan.   o Patient is to return in one year for their Podiatric Diabetic Evaluation.   o Diabetic foot exam performed and documented this date, compliant with CQM  required standards. Detail of findings as noted in physical exam.Lower extremity Neuro exam for diabetic patient performed and documented this date, compliant with PQRS required standards. Detail of findings as noted in physical exam.Advised patient importance of good routine lower extremity hygiene. Advised patient importance of evaluating for intact skin and pain free nail borders. Advised patient to use mirror to evaluate plantar/ soles of feet for better visualization. Advised patient monitor and phone office to be seen if any cracking to skin, open lesions, painful nail borders or if nails become elongated prior to next visit. Advised patient importance of daily cleansing of lower extremities, followed by good skin cream to maintain normal hydration of skin. Also advised patient importance of close daily monitoring of blood sugar. Advised to regulate diet and medications to maintain control of blood sugar in optimal range. Contact primary care provider if difficulties maintaining blood sugar levels.Advised Patient of presence of Diabetes Mellitus condition. Advised Patient risk of progression and worsening or improvement, then return of condition. Will monitor condition for any change in future. Treat with most appropriate treatment pending status of condition.Counseled and advised patient extensively on nature and ramifications of diabetes. Standard instructions given to patient for good diabetic foot care and maintenance. Advised importance of careful monitoring to avoid break down and complications secondary to diabetes. Advised patient importance of strict maintenance of blood sugar control. Advised patient of possible ominous results from neglect of condition,i.e.: amputation/ loss of digits, feet and legs, or even death.Patient states understands counseling, will monitor closely, continue good hygiene and routine diabetic foot care. Patient will contact office is questions or problems.   o Electronically  Identified Patient Education Materials Provided Electronically  · Disposition  o Call or Return if symptoms worsen or persist.            Electronically Signed by: Jeramy Casey DPM -Author on February 25, 2021 03:19:51 PM

## 2021-05-10 NOTE — H&P
"   History and Physical      Patient Name: Melaina Centeno   Patient ID: [id number]   Sex: Female   YOB: 1950    Primary Care Provider: Kelsey ATKINSON   Referring Provider: Kelsey ATKINSON    Visit Date: March 9, 2021    Provider: CHINA Miller   Location: [facility] [address]   Location Address: [address] [place] [address]  [id number]   Location Phone: [phone]          Chief Complaint  · Cirrhosis      History Of Present Illness  Melania Centeno is a 70 year old /White female who presents to the office today.      60, Na 132, alk phos 167, ALT 26, AST 35, Bili 0.58. Acute hep panel negative 6/2020  Pt states her last colonoscopy was about 5 yrs ago, +polyps.    Denies cardiopulmonary hx.\">New pt referred for cirrhosis. HX of ETOH occasionally, once/week , but no ETOH x 30 yrs.   CT abd /pelvis w and w/o contrast  1/21/21: cirrhotic liver, otherwise negative (+kidney cyst)  Pt states she's lost about 40# over the last year, not sure why, has been stable the last few months. Good appetite. No N/V. Some abd pain, saw PCP and CT ordered as above. Pt states pain was right sided but has resolved, she does describe some generalized lower abd pain. Denies constipation, has frequent stools 2-3 x day, no blood in stool or black stool. Has trouble w regulating blood sugar.     11/25/2020 CBC unremarkable GFR > 60, Na 132, alk phos 167, ALT 26, AST 35, Bili 0.58. Acute hep panel negative 6/2020  Pt states her last colonoscopy was about 5 yrs ago, +polyps.    Denies cardiopulmonary hx.       Past Medical History  Cervical spinal stenosis; Cervicalgia; Diabetes mellitus type 2, noninsulin dependent; Foot pain; Fusion of spine of cervical region; GERD (gastroesophageal reflux disease); Hyperlipidemia; Hypertension, Benign Essential         Past Surgical History  Cervical Fusion; Cholecystecomy; Knee surgery         Medication List  Name Date Started Instructions " "  amlodipine 5 mg oral tablet 01/26/2021 take 1 tablet (5 mg) by oral route once daily for 90 days   atorvastatin 10 mg oral tablet 01/26/2021 take 1 tablet (10 mg) by oral route once daily at bedtime for 90 days   benazepril 40 mg oral tablet 01/26/2021 take 1 tablet (40 mg) by oral route once daily for 90 days   fluconazole 150 mg oral tablet 01/26/2021 take 1 tablet (150 mg) by oral route once, repeat in 72h   glipizide 10 mg oral tablet 01/26/2021 take 1 tab BID   hydrocodone-acetaminophen 7.5-325 mg/15 mL oral solution  take 15 milliliters by oral route every 4-6 hours as needed for pain   omeprazole 40 mg oral capsule,delayed release(DR/EC) 01/26/2021 take 1 capsule (40 mg) by oral route once daily before a meal for 90 days   pen needle, diabetic 32 gauge x 3/16\" miscellaneous needle 01/26/2021 use as directed   tizanidine 4 mg oral capsule  take 1 capsule (4 mg) by oral route 3 times per day   Tresiba FlexTouch U-100 100 unit/mL (3 mL) subcutaneous insulin pen 01/26/2021 inject 10unit SQ QD         Allergy List  NO KNOWN DRUG ALLERGIES       Allergies Reconciled  Family Medical History  Family history of colon cancer; Family history of cancer; Family history of heart disease; Family history of diabetes mellitus         Social History  Alcohol (Never); lives with spouse; ; Retired; Tobacco (Never)         Immunizations  Name Date Admin   Influenza 11/25/2020         Review of Systems  · Constitutional  o Admits  o : good general health lately, no acute distress  · Eyes  o Denies  o : cataracts, glaucoma  · HENT  o Denies  o : hearing problems, trouble swallowing  · Cardiovascular  o Denies  o : irregular heartbeat, heart failure  · Respiratory  o Denies  o : asthma, shortness of breath  · Gastrointestinal  o Denies  o : additional gastrointestinal symptoms except as noted in the HPI  · Genitourinary  o Denies  o : dysuria, kidney stone  · Neurologic  o Denies  o : strokes, seizure " "activity  · Musculoskeletal  o Admits  o : bone or joint pain  o Denies  o : arthritis  · Psychiatric  o Admits  o : pleasant affect  o Denies  o : depression  · Heme-Lymph  o Denies  o : bleeding disorder  · Allergic-Immunologic  o Admits  o : seasonal allergies      Vitals  Date Time BP Position Site L\R Cuff Size HR RR TEMP (F) WT  HT  BMI kg/m2 BSA m2 O2 Sat FR L/min FiO2 HC       02/25/2021 02:33 /71 Sitting    79 - R  97.6 190lbs 0oz 5'  1\" 35.9 1.93 96 %      03/09/2021 02:41 /62 Sitting    78 - R  98.4 193lbs 4oz 5'  1\" 36.51 1.94             Physical Examination  · Constitutional  o Appearance  o : well developed, well-nourished, in no acute distress  · Head and Face  o Head  o :   § Inspection  § : atraumatic, normocephalic  · Eyes  o Sclerae  o : sclerae white, no sclerae icterus  · Neck  o Inspection/Palpation  o : supple  · Respiratory  o Respiratory Effort  o : breathing unlabored  o Inspection of Chest  o : normal appearance, no retractions  · Cardiovascular  o Peripheral Vascular System  o :   § Extremities  § : no cyanosis, clubbing or edema  · Gastrointestinal  o Abdominal Examination  o : soft, nontender to palpation--mild tenderness right side of abdomen  · Skin and Subcutaneous Tissue  o General Inspection  o : no lesions present, no rashes present  · Neurologic  o Mental Status Examination  o :   § Orientation  § : grossly oriented to person, place and time  § Speech/Language  § : communication ability within normal limits, voice quality normal, articulation of speech normal, no evidence of aphasia  § Attention  § : attention normal, concentration abilities normal  o Sensation  o : grossly intact  o Gait and Station  o :   § Gait Screening  § : normal gait  · Psychiatric  o Mood and Affect  o : Mood and affect are appropriate to circumstances          Assessment  · Pre-op exam     V72.84/Z01.818  · Abdominal pain     789.00/R10.9  · Cirrhosis     571.5/K74.60  per CT scan  · Weight " loss     783.21/R63.4  unintentional      Plan  · Orders  o Liver Panel (44894) - - 03/09/2021  o Copper level (22726) - - 03/09/2021  o Ceruloplasmin level (43299) - - 03/09/2021  o Anti-Smooth Muscle Antibody (ASMA) [facility] (66840) - - 03/09/2021  o Anti-mitochondrial antibody assay (67327) - - 03/09/2021  o Alpha-1-Antitrypsin/Phenotype [facility] (78036, 45210) - - 03/09/2021  o Iron Profile (Iron 78583 TIBC 31236 and Transferrin 90309) (IRONP) - - 03/09/2021  o PT/INR (03269) - - 03/09/2021  o AFP ser (36331) - - 03/09/2021  o ROSIO (antinuclear antibody profile) by enzyme immunoassay (02334) - - 03/09/2021  o Ferritin ser/plas (20112) - - 03/09/2021  o SAMPSON Fibrosure [facility] (drawn at [facility] only and must be fasting 8 hours; requires HT and WT) (72614, 00509, 01405, 41061, 14737, 36265, 74217, 35323, 17612, 51338) - - 03/09/2021  o Immunoelectrophoresis, Serum [facility] (43388, 91261, 14423, 14462) - - 03/09/2021  o [facility] Pre-Op Covid-19 Screening (96413) - - 03/09/2021  · Medications  o GaviLyte-N 420 gram oral recon soln   SIG: take as directed   DISP: (1) Box with 0 refills  Prescribed on 03/09/2021     o Medications have been Reconciled  o Transition of Care or Provider Policy  · Instructions  o Please Sign Permit for: EGD/COLONOSCOPY  o Indication: cirrhosis , screen for EV, lower abd pain   o Surgical Risk and Benefits: Possible risks/complications, benefits, and alternatives to surgical or invasive procedure have been explained to patient and/or legal guardian; Patient has been evaluated and can tolerate anesthesia and/or sedation. Risks, benefits, and alternatives to anesthesia and sedation have been explained to patient and/or legal guardian.  o Follow Up after Procedure.  o Patient was educated/instructed on their diagnosis, treatment and medications prior to discharge from the clinic today.  o Patient instructed to seek medical attention urgently for new or worsening symptoms.            Electronically Signed by: Caridad  CHINA Rios -Author on March 9, 2021 03:11:47 PM

## 2021-05-10 NOTE — H&P
History and Physical      Patient Name: Melania Centeno   Patient ID: [id number]   Sex: Female   YOB: 1950    Primary Care Provider: Kelsey ATKINSON   Referring Provider: Kelsey ATKINSON    Visit Date: May 21, 2020    Provider: CHINA Pinedo   Location: [facility]   Location Address: [address] [place] [address], [address]  [place], [place]  [id number]   Location Phone: [phone]          Chief Complaint  · New patient - establish care  · Medication refills       History Of Present Illness  Melania Centeno is a 69 year old /White female who presents for evaluation and treatment of:      New patient to establish care. Previous pt @ [facility].    HTN: takes Amlodipine and Benazepril. With good results.    GERD: takes Omeprazole.    HDL: takes Atorvastatin.    DM2: takes Glipizide. couldnt tolerate metformin due to constant diarrhea.  Pt states that she was on Lantus insulin at one time when she was being seen @ [facility]. Requesting blood work to see if she needs to restart.    Pt needs refills on all medications.    seeing pain doc for leg pain, [facility] pain. taking hydrocodone. also just started a new med but unsure of name.       Past Medical History  Disease Name Date Onset Notes   Cervical spinal stenosis 04/03/2019 Brisk reflexes suggestive of myelopathy   Cervicalgia --  --    Diabetes mellitus type 2, noninsulin dependent --  --    Fusion of spine of cervical region 06/19/2019 --    GERD (gastroesophageal reflux disease) --  --    Hyperlipidemia --  --    Hypertension, Benign Essential --  --          Past Surgical History  Procedure Name Date Notes   Cervical Fusion 3-14-19 Right C4-5, C5-6   Cholecystecomy --  --    Knee surgery --  --          Medication List  Name Date Started Instructions   amlodipine 5 mg oral tablet 05/21/2020 take 1 tablet (5 mg) by oral route once daily for 90 days   atorvastatin 10 mg oral tablet 05/21/2020 take 1 tablet (10 mg) by oral route once  daily at bedtime for 90 days   benazepril 40 mg oral tablet 05/21/2020 take 1 tablet (40 mg) by oral route once daily for 90 days   glipizide 10 mg oral tablet 05/21/2020 take 1 tab BID   Lantus U-100 Insulin 100 unit/mL subcutaneous solution  inject by subcutaneous route as per insulin protocol   Medrol (Lamont) 4 mg oral tablets,dose pack 06/19/2019 take as directed for 6 days   omeprazole 40 mg oral capsule,delayed release(DR/EC) 05/21/2020 take 1 capsule (40 mg) by oral route once daily before a meal for 90 days         Allergy List  Allergen Name Date Reaction Notes   NO KNOWN DRUG ALLERGIES --  --  --          Family Medical History  Disease Name Relative/Age Notes   Family history of cancer Sister/   Sister   Family history of heart disease Brother/  Sister/   --    Family history of diabetes mellitus Brother/  Mother/   Mother; Brother         Social History  Finding Status Start/Stop Quantity Notes   Alcohol Never --/-- --  06/19/2019 - 05/09/2019 - 04/03/2019 - does not drink   lives with spouse --  --/-- --  --     --  --/-- --  --    Retired --  --/-- --  --    Tobacco Never --/-- --  never smoker         Review of Systems  · Constitutional  o Denies  o : fatigue, night sweats  · Eyes  o Denies  o : double vision, blurred vision  · HENT  o Denies  o : vertigo, recent head injury  · Breasts  o Denies  o : abnormal changes in breast size, additional breast symptoms except as noted in the HPI  · Cardiovascular  o Denies  o : chest pain, irregular heart beats  · Respiratory  o Denies  o : shortness of breath, productive cough  · Gastrointestinal  o Denies  o : nausea, vomiting  · Genitourinary  o Denies  o : dysuria, urinary retention  · Integument  o Denies  o : hair growth change, new skin lesions  · Neurologic  o Denies  o : altered mental status, seizures  · Musculoskeletal  o Denies  o : joint swelling, limitation of motion  · Endocrine  o Denies  o : cold intolerance, heat  "intolerance  · Heme-Lymph  o Denies  o : petechiae, lymph node enlargement or tenderness  · Allergic-Immunologic  o Denies  o : frequent illnesses      Vitals  Date Time BP Position Site L\R Cuff Size HR RR TEMP (F) WT  HT  BMI kg/m2 BSA m2 O2 Sat HC       05/21/2020 01:14 /63 Sitting    86 - R  98.1 198lbs 0oz 5'  1\" 37.41 1.97 97 %          Physical Examination  · Constitutional  o Appearance  o : well developed, well-nourished, obese, no acute distress  · Head and Face  o Head  o : normocephalic, atraumatic  · Ears, Nose, Mouth and Throat  o Ears  o :   § External Ears  § : external auditory canal appearance normal, no discharge present  § Otoscopic Examination  § : tympanic membranes pearly white/gray bilaterally  o Nose  o :   § External Nose  § : no lesions noted  § Nasopharynx  § : no discharge present  o Oral Cavity  o :   § Oral Mucosa  § : oral mucosa light pink  o Throat  o :   § Oropharynx  § : tonsils without exudate, no palatal petechiae  · Neck  o Inspection/Palpation  o : normal appearance, no masses or tenderness, trachea midline  o Thyroid  o : gland size normal, nontender, no nodules or masses present on palpation  · Respiratory  o Respiratory Effort  o : breathing unlabored  o Inspection of Chest  o : chest rise symmetric bilaterally  o Auscultation of Lungs  o : clear to auscultation bilaterally throughout inspiration and expiration  · Cardiovascular  o Heart  o :   § Auscultation of Heart  § : regular rate and rhythm, no murmurs, gallops or rubs  o Peripheral Vascular System  o :   § Extremities  § : no edema  · Lymphatic  o Neck  o : no cervical lymphadenopathy, no supraclavicular lymphadenopathy  · Psychiatric  o Mood and Affect  o : mood normal, affect appropriate          Assessment  · Screening for depression     V79.0/Z13.89  · Screening for colon cancer     V76.51/Z12.11  · Visit for screening mammogram     V76.12/Z12.31  · Diabetes mellitus, type 2     250.00/E11.9  · Essential " hypertension     401.9/I10  · GERD (gastroesophageal reflux disease)     530.81/K21.9  · Hyperlipidemia     272.4/E78.5  · Obesity     278.00/E66.9    Problems Reconciled  Plan  · Orders  o ACO-18: Positive screen for clinical depression using a standardized tool and a follow-up plan documented () - V79.0/Z13.89 - 05/21/2020  o COLONOSCOPY REFERRAL (COLON) - V76.51/Z12.11 - 05/21/2020  o Screening Mammography; Bilateral 3D (90910, , 58758) - V76.12/Z12.31 - 05/21/2020  o Diabetes 1 Panel (CMP, Lipid, A1c) [facility] (56400, 20622, 75037) - 250.00/E11.9 - 05/21/2020  o CBC with Auto Diff [facility] (89794) - 250.00/E11.9 - 05/21/2020  o Urine microalbumin (49942) - 250.00/E11.9 - 05/21/2020  o ACO-39: Current medications updated and reviewed () - - 05/21/2020  o ACO-14: Influenza immunization administered or previously received () - - 05/21/2020   pt refused  · Medications  o amlodipine 5 mg oral tablet   SIG: take 1 tablet (5 mg) by oral route once daily for 90 days   DISP: (90) tablet with 0 refills  Prescribed on 05/21/2020     o atorvastatin 10 mg oral tablet   SIG: take 1 tablet (10 mg) by oral route once daily at bedtime for 90 days   DISP: (90) tablet with 0 refills  Prescribed on 05/21/2020     o benazepril 40 mg oral tablet   SIG: take 1 tablet (40 mg) by oral route once daily for 90 days   DISP: (90) tablet with 0 refills  Prescribed on 05/21/2020     o omeprazole 40 mg oral capsule,delayed release(DR/EC)   SIG: take 1 capsule (40 mg) by oral route once daily before a meal for 90 days   DISP: (90) capsule with 0 refills  Prescribed on 05/21/2020     o glipizide 10 mg oral tablet   SIG: take 1 tab BID   DISP: (180) tablet with 0 refills  Adjusted on 05/21/2020     o Medications have been Reconciled  o Transition of Care or Provider Policy  · Instructions  o Depression Screen completed and scanned into the EMR under the designated folder within the patient's documents.  o Today's PHQ-9 result is  _5__  o Continue blood sugar monitoring daily and record. Bring your log to office visits. Call the office for readings below 70 and above 250 or any complications.  o Daily foot care. Avoid walking barefoot. Annual Dilated Eye Exam.  o Discussed with patient blood pressure monitoring, hemoglobin A1C levels need to be below 7.0, and LDL (Lipid) goals below 70.  o Patient advised to monitor blood pressure (B/P) at home and journal readings. Patient informed that a B/P reading at home of more than 130/80 is considered hypertension. For readings greater [id number]/90 or higher patient is advised to follow up in the office with readings for management. Patient advised to limit sodium intake.  o Advised that cheeses and other sources of dairy fats, animal fats, fast food, and the extras (candy, pastries, pies, doughnuts and cookies) all contain LDL raising nutrients. Advised to increase fruits, vegetables, whole grains, and to monitor portion sizes.   o Patient is taking medications as prescribed and doing well.   o Patient was educated/instructed on their diagnosis, treatment and medications prior to discharge from the clinic today.  o Patient instructed to seek medical attention urgently for new or worsening symptoms.  o Call the office with any concerns or questions.  o Chronic conditions reviewed and taken into consideration for today's treatment plan.  o Discussed Covid-19 precautions including, but not limited to, social distancing, avoid touching your face, and hand washing.   · Disposition  o Follow Up in 3 months            Electronically Signed by: CHINA Pinedo -Author on May 21, 2020 02:09:16 PM

## 2021-05-13 NOTE — PROGRESS NOTES
Progress Note      Patient Name: Melania Centeno   Patient ID: [id number]   Sex: Female   YOB: 1950    Primary Care Provider: Kelsey ATKINSON   Referring Provider: Kelsey ATKINSON    Visit Date: November 25, 2020    Provider: CHINA Pinedo   Location: [facility]   Location Address: [address] [place] [address], [address]  [place], [place]  [id number]   Location Phone: [phone]          Chief Complaint  · pain in right side      History Of Present Illness  Melania Centeno is a 70 year old /White female who presents for evaluation and treatment of:      Patient is here with complaints of right sided abdominal pain that radiates up under her right rib when coughing. Patient says that it started Sunday night and has progressively gotten worse. Patient states that the pain is worse when she takes a deep breath or coughs. Patient denies any sore throat, fever, body aches, chills, etc. Patient denies any exposure to COVID.  pos mild nausea, no vomiting or diarrhea.     pt had cholecystectomy years ago       Past Medical History  Disease Name Date Onset Notes   Cervical spinal stenosis 04/03/2019 Brisk reflexes suggestive of myelopathy   Cervicalgia --  --    Diabetes mellitus type 2, noninsulin dependent --  --    Fusion of spine of cervical region 06/19/2019 --    GERD (gastroesophageal reflux disease) --  --    Hyperlipidemia --  --    Hypertension, Benign Essential --  --          Past Surgical History  Procedure Name Date Notes   Cervical Fusion 3-14-19 Right C4-5, C5-6   Cholecystecomy --  --    Knee surgery --  --          Medication List  Name Date Started Instructions   amlodipine 5 mg oral tablet 10/14/2020 take 1 tablet (5 mg) by oral route once daily for 90 days   atorvastatin 10 mg oral tablet 10/14/2020 take 1 tablet (10 mg) by oral route once daily at bedtime for 90 days   benazepril 40 mg oral tablet 10/14/2020 take 1 tablet (40 mg) by oral route  once daily for 90 days   duloxetine 20 mg oral capsule,delayed release(DR/EC)  take 1 capsule (20 mg) by oral route 2 times per day   glipizide 10 mg oral tablet 10/14/2020 take 1 tab BID   hydrocodone-acetaminophen 7.5-325 mg/15 mL oral solution  take 15 milliliters by oral route every 4-6 hours as needed for pain   omeprazole 40 mg oral capsule,delayed release(DR/EC) 10/14/2020 take 1 capsule (40 mg) by oral route once daily before a meal for 90 days   tizanidine 4 mg oral capsule  take 1 capsule (4 mg) by oral route 3 times per day   Tresiba FlexTouch U-100 100 unit/mL (3 mL) subcutaneous insulin pen 10/14/2020 inject 10unit SQ QD         Allergy List  Allergen Name Date Reaction Notes   NO KNOWN DRUG ALLERGIES --  --  --        Allergies Reconciled  Family Medical History  Disease Name Relative/Age Notes   Family history of cancer Sister/   Sister   Family history of heart disease Brother/  Sister/   --    Family history of diabetes mellitus Brother/  Mother/   Mother; Brother         Social History  Finding Status Start/Stop Quantity Notes   Alcohol Never --/-- --  06/19/2019 - 05/09/2019 - 04/03/2019 - does not drink   lives with spouse --  --/-- --  --     --  --/-- --  --    Retired --  --/-- --  --    Tobacco Never --/-- --  never smoker         Review of Systems  · Constitutional  o Denies  o : fatigue  · Eyes  o Denies  o : blurred vision, changes in vision  · HENT  o Denies  o : headaches  · Cardiovascular  o Denies  o : chest pain, irregular heart beats, rapid heart rate, dyspnea on exertion  · Respiratory  o Denies  o : shortness of breath, wheezing, cough  · Gastrointestinal  o Admits  o : abdominal pain, nausea   o Denies  o : vomiting, diarrhea, constipation, blood in stools, melena  · Genitourinary  o Denies  o : frequency, dysuria, hematuria  · Integument  o Denies  o : rash, new skin lesions  · Musculoskeletal  o Denies  o : joint pain, joint swelling, muscle  "pain  · Endocrine  o Admits  o : central obesity  o Denies  o : polyuria, polydipsia      Vitals  Date Time BP Position Site L\R Cuff Size HR RR TEMP (F) WT  HT  BMI kg/m2 BSA m2 O2 Sat FR L/min FiO2 HC       11/25/2020 07:18 /46 Sitting    73 - R 18  189lbs 6oz 5'  1\" 35.78 1.92 97 %  21%          Physical Examination  · Constitutional  o Appearance  o : well developed, well-nourished, no acute distress  · Head and Face  o Head  o : normocephalic, atraumatic  · Ears, Nose, Mouth and Throat  o Ears  o :   § External Ears  § : external auditory canal appearance normal, no discharge present  § Otoscopic Examination  § : tympanic membranes pearly white/gray bilaterally  o Nose  o :   § External Nose  § : no lesions noted  § Nasopharynx  § : no discharge present  o Oral Cavity  o :   § Oral Mucosa  § : oral mucosa light pink  o Throat  o :   § Oropharynx  § : tonsils without exudate, no palatal petechiae  · Neck  o Inspection/Palpation  o : normal appearance, no masses or tenderness, trachea midline  o Thyroid  o : gland size normal, nontender, no nodules or masses present on palpation  · Respiratory  o Respiratory Effort  o : breathing unlabored  o Inspection of Chest  o : chest rise symmetric bilaterally  o Auscultation of Lungs  o : clear to auscultation bilaterally throughout inspiration and expiration  · Cardiovascular  o Heart  o :   § Auscultation of Heart  § : regular rate and rhythm, no murmurs, gallops or rubs  o Peripheral Vascular System  o :   § Extremities  § : no edema  · Gastrointestinal  o Abdominal Examination  o : abdomen very tender to palpation right upper quad, tone normal without rigidity or guarding, no masses present, no discernable hernia or protrusion, abdominal obesity   o Liver and spleen  o : no hepatomegaly present, liver nontender to palpation, spleen not palpable  o Hernias  o : no hernias present  o Special Tests  o : No fluid wave or shifting dullness is appreciated; Negative " Day's sign; Negative McBurney's point tenderness; Negative Rovsing's, Psoas, Obturator signs. No cutaneous hyperesthesia appreciated  · Lymphatic  o Neck  o : no cervical lymphadenopathy, no supraclavicular lymphadenopathy  · Psychiatric  o Mood and Affect  o : mood normal, affect appropriate          Assessment  · Need for influenza vaccination     V04.81/Z23  · Abdominal pain     789.00/R10.9  · Cough     786.2/R05  · Abdominal tenderness, RUQ (right upper quadrant)     789.61/R10.811  · Nausea     787.02/R11.0    Problems Reconciled  Plan  · Orders  o Immunization Admin Fee (Single) ([facility]) (61837) - V04.81/Z23 - 11/25/2020  o Fluzone Quadrivalent Vaccine, age 6 months + ([id number]) - V04.81/Z23 - 11/25/2020   Vaccine - Influenza; Dose: 0.5; Site: Left Deltoid; Route: Intramuscular; Date: 11/25/2020 07:30:00; Exp: 06/30/2021; Lot: [id number]; Mfg: sanofi pasteur; TradeName: Fluzone Quadrivalent; Administered By: Mackenzie Figueroa MA; Comment: Patient tolerated well  o CBC with Auto Diff [facility] ([id number]) - 789.00/R10.9 - 11/25/2020  o CMP [facility] ([id number]) - 789.00/R10.9 - 11/25/2020  o Abdomen and Pelvis (CT) (with contrast) (58170) - 789.00/R10.9 - 11/25/2020   ASAP  o Chest xray 2 views [facility] ([id number]) - 786.2/R05 - 11/25/2020  o ACO-39: Current medications updated and reviewed (1159F, ) - - 11/25/2020  o ACO-14: Influenza immunization administered or previously received [facility] () - - 11/25/2020  · Medications  o Medications have been Reconciled  o Transition of Care or Provider Policy  · Instructions  o Instructed to seek medical attention urgently for new or worsening symptoms.  o Patient was educated/instructed on their diagnosis, treatment and medications prior to discharge from the clinic today.  o Patient instructed to seek medical attention urgently for new or worsening symptoms.  o Call the office with any concerns or questions.  o Chronic conditions reviewed and taken into consideration for today's treatment  plan.  o Discussed Covid-19 precautions including, but not limited to, social distancing, avoid touching your face, and hand washing.   · Disposition  o Call or Return if symptoms worsen or persist.  o Follow Up PRN            Electronically Signed by: CHINA Pinedo -Author on November 25, 2020 08:00:32 AM

## 2021-05-13 NOTE — PROGRESS NOTES
Progress Note      Patient Name: Melania Centeno   Patient ID: [id number]   Sex: Female   YOB: 1950    Primary Care Provider: Kelsey ATKINSON   Referring Provider: Kelsey ATKINSON    Visit Date: October 14, 2020    Provider: CHINA Pinedo   [place] [facility]   [place] Address[place] [address] [place] [address], [address]  [place], [place]  [id number]   Location Phone: [phone]          Chief Complaint  · Med refill  · follow up on vaginal burning      History Of Present Illness  Melania Centeno is a 70 year old /White female who presents for evaluation and treatment of:      Patient is here today to have medication refills, and follow up on her vaginal burning.     HTN: Amlodipine, Benazepril- doing well   HLD: Atorvastatin- doing well   DM 2: Lantus, Glipizide- not checking BG at home   GERD: Omeprazole- doing well     Patient states she hasn't had her Lantus in months, but states all other meds are doing well. states never refilled lantus and it never worked very well anyway.     Patient states that the vaginal burning has gotten better but it is now itching. Patient completed Macrobid and Pyridium.                  Past Medical History  Disease Name Date Onset Notes   Cervical spinal stenosis 04/03/2019 Brisk reflexes suggestive of myelopathy   Cervicalgia --  --    Diabetes mellitus type 2, noninsulin dependent --  --    Fusion of spine of cervical region 06/19/2019 --    GERD (gastroesophageal reflux disease) --  --    Hyperlipidemia --  --    Hypertension, Benign Essential --  --          Past Surgical History  Procedure Name Date Notes   Cervical Fusion 3-14-19 Right C4-5, C5-6   Cholecystecomy --  --    Knee surgery --  --          Medication List  Name Date Started Instructions   amlodipine 5 mg oral tablet 10/14/2020 take 1 tablet (5 mg) by oral route once daily for 90 days   atorvastatin 10 mg oral tablet 10/14/2020 take 1 tablet (10 mg) by oral  route once daily at bedtime for 90 days   benazepril 40 mg oral tablet 10/14/2020 take 1 tablet (40 mg) by oral route once daily for 90 days   glipizide 10 mg oral tablet 10/14/2020 take 1 tab BID   Lantus U-100 Insulin 100 unit/mL subcutaneous solution  inject by subcutaneous route as per insulin protocol   omeprazole 40 mg oral capsule,delayed release(DR/EC) 10/14/2020 take 1 capsule (40 mg) by oral route once daily before a meal for 90 days         Allergy List  Allergen Name Date Reaction Notes   NO KNOWN DRUG ALLERGIES --  --  --        Allergies Reconciled  Family Medical History  Disease Name Relative/Age Notes   Family history of cancer Sister/   Sister   Family history of heart disease Brother/  Sister/   --    Family history of diabetes mellitus Brother/  Mother/   Mother; Brother         Social History  Finding Status Start/Stop Quantity Notes   Alcohol Never --/-- --  06/19/2019 - 05/09/2019 - 04/03/2019 - does not drink   lives with spouse --  --/-- --  --     --  --/-- --  --    Retired --  --/-- --  --    Tobacco Never --/-- --  never smoker         Review of Systems  · Constitutional  o Denies  o : fatigue  · Eyes  o Denies  o : blurred vision, changes in vision  · HENT  o Denies  o : headaches  · Cardiovascular  o Denies  o : chest pain, irregular heart beats, rapid heart rate, dyspnea on exertion  · Respiratory  o Denies  o : shortness of breath, wheezing, cough  · Gastrointestinal  o Denies  o : nausea, vomiting, diarrhea, constipation, abdominal pain, blood in stools, melena  · Genitourinary  o Denies  o : frequency, dysuria, hematuria  · Integument  o Denies  o : rash, new skin lesions  · Musculoskeletal  o Denies  o : joint pain, joint swelling, muscle pain  · Endocrine  o Admits  o : central obesity  o Denies  o : polyuria, polydipsia      Vitals  Date Time BP Position Site L\R Cuff Size HR RR TEMP (F) WT  HT  BMI kg/m2 BSA m2 O2 Sat FR L/min FiO2 HC       05/21/2020 01:14 /63  "Sitting    86 - R  98.1 198lbs 0oz 5'  1\" 37.41 1.97 97 %      09/29/2020 08:08 /74 Sitting    89 - R  98 189lbs 0oz 5'  2\" 34.57 1.94 98 %  21%    10/14/2020 06:58 /86 Sitting    80 - R  98 190lbs 4oz 5'  1\" 35.95 1.93 98 %  21%          Physical Examination  · Constitutional  o Appearance  o : well developed, well-nourished, no acute distress  · Head and Face  o Head  o : normocephalic, atraumatic  · Ears, Nose, Mouth and Throat  o Ears  o :   § External Ears  § : external auditory canal appearance normal, no discharge present  § Otoscopic Examination  § : tympanic membranes pearly white/gray bilaterally  o Nose  o :   § External Nose  § : no lesions noted  § Nasopharynx  § : no discharge present  o Oral Cavity  o :   § Oral Mucosa  § : oral mucosa light pink  o Throat  o :   § Oropharynx  § : tonsils without exudate, no palatal petechiae  · Neck  o Inspection/Palpation  o : normal appearance, no masses or tenderness, trachea midline  o Thyroid  o : gland size normal, nontender, no nodules or masses present on palpation  · Respiratory  o Respiratory Effort  o : breathing unlabored  o Inspection of Chest  o : chest rise symmetric bilaterally  o Auscultation of Lungs  o : clear to auscultation bilaterally throughout inspiration and expiration  · Cardiovascular  o Heart  o :   § Auscultation of Heart  § : regular rate and rhythm, no murmurs, gallops or rubs  o Peripheral Vascular System  o :   § Extremities  § : trace edema bilat   · Lymphatic  o Neck  o : no cervical lymphadenopathy, no supraclavicular lymphadenopathy  · Psychiatric  o Mood and Affect  o : mood normal, affect appropriate          Assessment  · Diabetes mellitus, type 2     250.00/E11.9  trial Tresiba. SE and admin discussed   · Essential hypertension     401.9/I10  · GERD (gastroesophageal reflux disease)     530.81/K21.9  · Hyperlipidemia     272.4/E78.5  · Vaginitis     616.10/N76.0  if no improvement with Diflucan f/u     Problems " Reconciled  Plan  · Orders  o Diabetes 2 Panel (Urine Microalbumin, CMP, Lipid, A1c, ) [facility] ([id number], 96374, 37134, 30618) - 250.00/E11.9 - 10/14/2020  o CBC with Auto Diff [facility] ([id number]) - 250.00/E11.9 - 10/14/2020  o ACO-39: Current medications updated and reviewed (, 1159F) - - 10/14/2020  · Medications  o Tresiba FlexTouch U-100 100 unit/mL (3 mL) subcutaneous insulin pen   SIG: inject 10unit SQ QD   DISP: (90) Each with 0 refills  Prescribed on 10/14/2020     o Diflucan 150 mg oral tablet   SIG: take 1 tablet (150 mg) by oral route now, repeat in 72hr   DISP: (2) Tablet with 0 refills  Prescribed on 10/14/2020     o amlodipine 5 mg oral tablet   SIG: take 1 tablet (5 mg) by oral route once daily for 90 days   DISP: (90) Tablet with 0 refills  Refilled on 10/14/2020     o atorvastatin 10 mg oral tablet   SIG: take 1 tablet (10 mg) by oral route once daily at bedtime for 90 days   DISP: (90) Tablet with 0 refills  Refilled on 10/14/2020     o benazepril 40 mg oral tablet   SIG: take 1 tablet (40 mg) by oral route once daily for 90 days   DISP: (90) Tablet with 0 refills  Refilled on 10/14/2020     o glipizide 10 mg oral tablet   SIG: take 1 tab BID   DISP: (180) Tablet with 0 refills  Refilled on 10/14/2020     o omeprazole 40 mg oral capsule,delayed release(DR/EC)   SIG: take 1 capsule (40 mg) by oral route once daily before a meal for 90 days   DISP: (90) Capsule with 0 refills  Refilled on 10/14/2020     o Medications have been Reconciled  o Transition of Care or Provider Policy  · Instructions  o Discussed with patient blood pressure monitoring, hemoglobin A1C levels need to be below 7.0, and LDL (Lipid) goals below 70.  o Patient advised to monitor blood pressure (B/P) at home and journal readings. Patient informed that a B/P reading at home of more than 130/80 is considered hypertension. For readings greater [id number]/90 or higher patient is advised to follow up in the office with readings for management.  Patient advised to limit sodium intake.  o Advised that cheeses and other sources of dairy fats, animal fats, fast food, and the extras (candy, pastries, pies, doughnuts and cookies) all contain LDL raising nutrients. Advised to increase fruits, vegetables, whole grains, and to monitor portion sizes.   o Patient is taking medications as prescribed and doing well.   o Take all medications as prescribed/directed.  o Patient was educated/instructed on their diagnosis, treatment and medications prior to discharge from the clinic today.  o Patient instructed to seek medical attention urgently for new or worsening symptoms.  o Call the office with any concerns or questions.  o Chronic conditions reviewed and taken into consideration for today's treatment plan.  o Discussed Covid-19 precautions including, but not limited to, social distancing, avoid touching your face, and hand washing.   o Electronically Identified Patient Education Materials Provided Electronically  · Disposition  o Follow Up in 3 months     pt missed mammo appt in Oct, we will resched this for her today             Electronically Signed by: CHINA Pinedo -Author on October 14, 2020 07:38:48 AM

## 2021-05-13 NOTE — PROGRESS NOTES
Progress Note      Patient Name: Melania Centeno   Patient ID: [id number]   Sex: Female   YOB: 1950    Primary Care Provider: Kelsey ATKINSON   Referring Provider: Kelsey ATKINSON    Visit Date: September 29, 2020    Provider: CHINA Ramos   Location: [facility]   Location Address: [address] [place] [address], [address]  [place], [place]  [id number]   Location Phone: [phone]          Chief Complaint  · yeast infection  · dysuria      History Of Present Illness  Melania Centeno is a 70 year old /White female who presents for evaluation and treatment of:      Patient is here today with the complaint of burning with urination, sore vagina. Patient denies itching. Patient states that this has been going on for a few weeks now, but has worsened in the past 4 days. Patient states that she tried the OTC Monostat but couldn't get the medication up far enough because of the pain. Patient states that it hurts to sit down. Patient states that the only way that she can use the bathroom is if she gets in the shower and uses the shower head to hold it closely to her vagina because the burning is so bad. She said she also has to stand over a vent to let the cool air hit it. Patient states that it feels like someone has put a match to her. Denies any vaginal d/c, denies any frequency, does report urgency but that is normal for pt. She states she has noticed blisters on her labia and is not sure if the burning is coming from them or her urine. She has noticed some blood on her toilet paper when she wipes, but unsure where that is coming from also.       Past Medical History  Disease Name Date Onset Notes   Cervical spinal stenosis 04/03/2019 Brisk reflexes suggestive of myelopathy   Cervicalgia --  --    Diabetes mellitus type 2, noninsulin dependent --  --    Fusion of spine of cervical region 06/19/2019 --    GERD (gastroesophageal reflux disease) --  --    Hyperlipidemia --   --    Hypertension, Benign Essential --  --          Past Surgical History  Procedure Name Date Notes   Cervical Fusion 3-14-19 Right C4-5, C5-6   Cholecystecomy --  --    Knee surgery --  --          Medication List  Name Date Started Instructions   amlodipine 5 mg oral tablet 06/02/2020 take 1 tablet (5 mg) by oral route once daily for 90 days   atorvastatin 10 mg oral tablet 06/02/2020 take 1 tablet (10 mg) by oral route once daily at bedtime for 90 days   benazepril 40 mg oral tablet 06/02/2020 take 1 tablet (40 mg) by oral route once daily for 90 days   glipizide 10 mg oral tablet 06/02/2020 take 1 tab BID   Lantus U-100 Insulin 100 unit/mL subcutaneous solution  inject by subcutaneous route as per insulin protocol   omeprazole 40 mg oral capsule,delayed release(DR/EC) 06/02/2020 take 1 capsule (40 mg) by oral route once daily before a meal for 90 days         Allergy List  Allergen Name Date Reaction Notes   NO KNOWN DRUG ALLERGIES --  --  --        Allergies Reconciled  Family Medical History  Disease Name Relative/Age Notes   Family history of cancer Sister/   Sister   Family history of heart disease Brother/  Sister/   --    Family history of diabetes mellitus Brother/  Mother/   Mother; Brother         Social History  Finding Status Start/Stop Quantity Notes   Alcohol Never --/-- --  06/19/2019 - 05/09/2019 - 04/03/2019 - does not drink   lives with spouse --  --/-- --  --     --  --/-- --  --    Retired --  --/-- --  --    Tobacco Never --/-- --  never smoker         Review of Systems  · Constitutional  o Denies  o : fatigue  · Eyes  o Denies  o : blurred vision, changes in vision  · HENT  o Denies  o : headaches  · Cardiovascular  o Denies  o : chest pain, irregular heart beats, rapid heart rate, dyspnea on exertion  · Respiratory  o Denies  o : shortness of breath, wheezing, cough  · Gastrointestinal  o Denies  o : nausea, vomiting, diarrhea, constipation, abdominal pain, blood in stools,  "melena  · Genitourinary  o Admits  o : dysuria, hematuria, genital sores  o Denies  o : frequency  · Integument  o Denies  o : rash, new skin lesions  · Musculoskeletal  o Denies  o : joint pain, joint swelling, muscle pain  · Endocrine  o Denies  o : polyuria, polydipsia      Vitals  Date Time BP Position Site L\R Cuff Size HR RR TEMP (F) WT  HT  BMI kg/m2 BSA m2 O2 Sat        09/29/2020 08:08 /74 Sitting    89 - R  98 189lbs 0oz 5'  2\" 34.57 1.94 98 %          Physical Examination  · Constitutional  o Appearance  o : well developed, well-nourished, no acute distress  · Head and Face  o Head  o : normocephalic, atraumatic  · Neck  o Inspection/Palpation  o : normal appearance, no masses or tenderness, trachea midline  o Thyroid  o : gland size normal, nontender, no nodules or masses present on palpation  · Respiratory  o Respiratory Effort  o : breathing unlabored  o Inspection of Chest  o : chest rise symmetric bilaterally  o Auscultation of Lungs  o : clear to auscultation bilaterally throughout inspiration and expiration  · Cardiovascular  o Heart  o :   § Auscultation of Heart  § : regular rate and rhythm, no murmurs, gallops or rubs  o Peripheral Vascular System  o :   § Extremities  § : no edema  · Genitourinary  o External Genitalia  o : external labia with numerous open lesions, extremely TTP,   o Urethra  o :   § Urethral Meatus  § : inflammation present   o Bladder  o : nontender to palpation  o Anus  o : no inflammation or lesions present  · Lymphatic  o Neck  o : no cervical lymphadenopathy, no supraclavicular lymphadenopathy  · Psychiatric  o Mood and Affect  o : mood normal, affect appropriate          Results  · In-Office Procedures  o Lab procedure  § IOP - Urinalysis without Microscopy (Clinitek) [facility] ([id number])   § Color Ur: Yellow   § Clarity Ur: Slightly cloudy   § Glucose Ur Ql Strip: 100 mg/dL   § Bilirub Ur Ql Strip: Small   § Ketones Ur Ql Strip: Trace   § Sp Gr Ur Qn: greater than 1.030 "   § Hgb Ur Ql Strip: Moderate   § pH Ur-LsCnc: 6.0   § Prot Ur Ql Strip: 30 mg/dL   § Urobilinogen Ur Strip-mCnc: 4.0 E.U./dL   § Nitrite Ur Ql Strip: Negative   § WBC Est Ur Ql Strip: Large       Assessment  · Burning with urination     788.1/R30.0  · Vaginal pain     625.9/R10.2  · Vaginal aphthous ulcer     616.89/N76.5  · Hematuria     599.70/R31.9  · Cystitis     595.9/N30.90    Problems Reconciled  Plan  · Orders  o ACO-39: Current medications updated and reviewed (1159F, ) - - 09/29/2020  o Urine Culture (Clean Catch) [facility] ([id number]) - 788.1/R30.0, 625.9/R10.2 - 09/29/2020  o Herpes simplex virus (HSV) type 1 and 2 DNA detection (08245) - 788.1/R30.0, 625.9/R10.2, 616.89/N76.5 - 09/29/2020  · Medications  o Macrobid 100 mg oral capsule   SIG: take 1 capsule (100 mg) by oral route every 12 hours with food for 10 days   DISP: (20) capsules with 0 refills  Prescribed on 09/29/2020     o Pyridium 200 mg oral tablet   SIG: take 1 tablet (200 mg) by oral route 3 times per day after meals as needed for 3 days   DISP: (9) tablets with 0 refills  Prescribed on 09/29/2020     o Medications have been Reconciled  o Transition of Care or Provider Policy  · Instructions  o Take all medications as prescribed/directed.  o Patient was educated/instructed on their diagnosis, treatment and medications prior to discharge from the clinic today.  o Patient instructed to seek medical attention urgently for new or worsening symptoms.  o Call the office with any concerns or questions.  o Electronically Identified Patient Education Materials Provided Electronically  · Disposition  o Call or Return if symptoms worsen or persist.  o Follow Up PRN            Electronically Signed by: Stacy Julian APRN -Author on September 29, 2020 10:18:00 AM   4

## 2021-05-14 VITALS
OXYGEN SATURATION: 97 % | BODY MASS INDEX: 36.32 KG/M2 | DIASTOLIC BLOOD PRESSURE: 59 MMHG | HEIGHT: 61 IN | WEIGHT: 192.37 LBS | RESPIRATION RATE: 18 BRPM | SYSTOLIC BLOOD PRESSURE: 150 MMHG | HEART RATE: 93 BPM

## 2021-05-14 VITALS
DIASTOLIC BLOOD PRESSURE: 86 MMHG | HEART RATE: 80 BPM | BODY MASS INDEX: 35.92 KG/M2 | TEMPERATURE: 98 F | HEIGHT: 61 IN | SYSTOLIC BLOOD PRESSURE: 152 MMHG | WEIGHT: 190.25 LBS | OXYGEN SATURATION: 98 %

## 2021-05-14 VITALS
SYSTOLIC BLOOD PRESSURE: 178 MMHG | WEIGHT: 190 LBS | DIASTOLIC BLOOD PRESSURE: 71 MMHG | HEART RATE: 79 BPM | OXYGEN SATURATION: 96 % | BODY MASS INDEX: 35.87 KG/M2 | HEIGHT: 61 IN | TEMPERATURE: 97.6 F

## 2021-05-14 VITALS
BODY MASS INDEX: 34.78 KG/M2 | HEART RATE: 89 BPM | SYSTOLIC BLOOD PRESSURE: 171 MMHG | DIASTOLIC BLOOD PRESSURE: 74 MMHG | OXYGEN SATURATION: 98 % | WEIGHT: 189 LBS | HEIGHT: 62 IN | TEMPERATURE: 98 F

## 2021-05-14 VITALS
SYSTOLIC BLOOD PRESSURE: 103 MMHG | WEIGHT: 189.37 LBS | DIASTOLIC BLOOD PRESSURE: 46 MMHG | HEIGHT: 61 IN | HEART RATE: 73 BPM | BODY MASS INDEX: 35.75 KG/M2 | OXYGEN SATURATION: 97 % | RESPIRATION RATE: 18 BRPM

## 2021-05-14 VITALS
WEIGHT: 192 LBS | BODY MASS INDEX: 36.25 KG/M2 | OXYGEN SATURATION: 97 % | HEIGHT: 61 IN | HEART RATE: 76 BPM | DIASTOLIC BLOOD PRESSURE: 63 MMHG | SYSTOLIC BLOOD PRESSURE: 139 MMHG

## 2021-05-14 VITALS
BODY MASS INDEX: 36.49 KG/M2 | HEART RATE: 78 BPM | TEMPERATURE: 98.4 F | WEIGHT: 193.25 LBS | DIASTOLIC BLOOD PRESSURE: 62 MMHG | HEIGHT: 61 IN | SYSTOLIC BLOOD PRESSURE: 141 MMHG

## 2021-05-14 NOTE — PROGRESS NOTES
Progress Note      Patient Name: Melania Centeno   Patient ID: [id number]   Sex: Female   YOB: 1950    Primary Care Provider: Kelsey ATKINSON   Referring Provider: Kelsey ATKINSON    Visit Date: March 15, 2021    Provider: CHINA Pinedo   Location: [facility] [place]   Location Address: [address] [place] [address], [address]  [place], [place]  [id number]   Location Phone: [phone]          Chief Complaint  · F/U-DM2, HTN, HLD      History Of Present Illness  Melania Centeno is a 70 year old /White female who presents for evaluation and treatment of:      Patient is here today to follow up on DM2. Patient is needing r/fs on Glipizide, Omeprazole, Amlodipine, Atorvastatin, and Benzapril. Patient states that her blood sugar is still running high. Patient stopped at 20 units with her insulin. Patient has no new concerns to address.     Patient has a hx of     DM2 (Tresiba, Glipizide)- no t well controlled at present- still running in the 200s fasting in the AM with an occasional reading in the 400's   HTN (Benzapril, Amlodipine)- fairly well controlled, manual re-check BP wnl   HLD (Atorvastatin)  GERD (Omeprazole)  Cervicalgia (Hydrocodone, Tizanidine)    Patient has received her flu vaccine.    states saw podiatrist and all was well. saw GI, is going to have colonoscopy. mammo appt is at the end of this month. GYN is 21st of April.       Past Medical History  Disease Name Date Onset Notes   Cervical spinal stenosis 04/03/2019 Brisk reflexes suggestive of myelopathy   Cervicalgia --  --    Diabetes mellitus type 2, noninsulin dependent --  --    Foot pain --  --    Fusion of spine of cervical region 06/19/2019 --    GERD (gastroesophageal reflux disease) --  --    Hyperlipidemia --  --    Hypertension, Benign Essential --  --          Past Surgical History  Procedure Name Date Notes   Cervical Fusion 3-14-19 Right C4-5, C5-6   Cholecystecomy --  --    Knee surgery --   "--          Medication List  Name Date Started Instructions   amlodipine 5 mg oral tablet 01/26/2021 take 1 tablet (5 mg) by oral route once daily for 90 days   atorvastatin 10 mg oral tablet 01/26/2021 take 1 tablet (10 mg) by oral route once daily at bedtime for 90 days   benazepril 40 mg oral tablet 01/26/2021 take 1 tablet (40 mg) by oral route once daily for 90 days   fluconazole 150 mg oral tablet 01/26/2021 take 1 tablet (150 mg) by oral route once, repeat in 72h   GaviLyte-N 420 gram oral recon soln 03/09/2021 take as directed   glipizide 10 mg oral tablet 01/26/2021 take 1 tab BID   hydrocodone-acetaminophen 7.5-325 mg/15 mL oral solution  take 15 milliliters by oral route every 4-6 hours as needed for pain   omeprazole 40 mg oral capsule,delayed release(/EC) 01/26/2021 take 1 capsule (40 mg) by oral route once daily before a meal for 90 days   pen needle, diabetic 32 gauge x 3/16\" miscellaneous needle 01/26/2021 use as directed   tizanidine 4 mg oral capsule  take 1 capsule (4 mg) by oral route 3 times per day   Tresiba FlexTouch U-100 100 unit/mL (3 mL) subcutaneous insulin pen 01/26/2021 inject 10unit SQ QD         Allergy List  Allergen Name Date Reaction Notes   NO KNOWN DRUG ALLERGIES --  --  --        Allergies Reconciled  Family Medical History  Disease Name Relative/Age Notes   Family history of colon cancer Sister/   --    Family history of cancer Sister/   Sister   Family history of heart disease Brother/  Sister/   --    Family history of diabetes mellitus Brother/  Mother/   Mother; Brother         Social History  Finding Status Start/Stop Quantity Notes   Alcohol Never --/-- --  06/19/2019 - 05/09/2019 - 04/03/2019 - does not drink   lives with spouse --  --/-- --  --     --  --/-- --  --    Retired --  --/-- --  --    Tobacco Never --/-- --  never smoker         Immunizations  NameDate Admin Mfg Trade Name Lot Number Route Inj VIS Given VIS Publication   Iopvczvhl59/25/2020 [facility] " "Fluzone Quadrivalent [id number] IM LD 11/25/2020 08/15/2019   Comments: Patient tolerated well         Review of Systems  · Constitutional  o Denies  o : fever, fatigue, weight loss, weight gain  · Cardiovascular  o Denies  o : lower extremity edema, claudication, chest pressure, palpitations  · Respiratory  o Denies  o : shortness of breath, wheezing, cough, hemoptysis, dyspnea on exertion  · Gastrointestinal  o Denies  o : nausea, vomiting, diarrhea, constipation, abdominal pain      Vitals  Date Time BP Position Site L\R Cuff Size HR RR TEMP (F) WT  HT  BMI kg/m2 BSA m2 O2 Sat FR L/min FiO2 HC       03/15/2021 12:05 /59 Sitting    93 - R 18  192lbs 6oz 5'  1\" 36.35 1.94 97 %  21%    03/15/2021 12:42 /62 Sitting                       Physical Examination  · Constitutional  o Appearance  o : well developed, well-nourished, no acute distress  · Head and Face  o Head  o : normocephalic, atraumatic  · Ears, Nose, Mouth and Throat  o Ears  o :   § External Ears  § : external auditory canal appearance normal, no discharge present  § Otoscopic Examination  § : tympanic membranes pearly white/gray bilaterally  o Nose  o :   § External Nose  § : no lesions noted  § Nasopharynx  § : no discharge present  o Oral Cavity  o :   § Oral Mucosa  § : oral mucosa light pink  o Throat  o :   § Oropharynx  § : tonsils without exudate, no palatal petechiae  · Neck  o Inspection/Palpation  o : normal appearance, no masses or tenderness, trachea midline  o Thyroid  o : gland size normal, nontender, no nodules or masses present on palpation  · Respiratory  o Respiratory Effort  o : breathing unlabored  o Inspection of Chest  o : chest rise symmetric bilaterally  o Auscultation of Lungs  o : clear to auscultation bilaterally throughout inspiration and expiration  · Cardiovascular  o Heart  o :   § Auscultation of Heart  § : regular rate and rhythm, no murmurs, gallops or rubs  o Peripheral Vascular System  o :   § Extremities  § : " no edema  · Lymphatic  o Neck  o : no cervical lymphadenopathy, no supraclavicular lymphadenopathy  · Psychiatric  o Mood and Affect  o : mood normal, affect appropriate          Assessment  · Diabetes mellitus, type 2     250.00/E11.9  cont to increase Tresiba by 2 units every 4-5 days until fasting AM BG around 130's. once she reaches dose of [place] if not at goal, call office for further guidance. Pt is to work on diet and exercise changes as well. pt unable to tolerate metformin due to GI SE and states cannot afford an additional med at this time. Is still taking glipizide regularly  · Essential hypertension     401.9/I10  · GERD (gastroesophageal reflux disease)     530.81/K21.9  · Hyperlipidemia     272.4/E78.5      Plan  · Orders  o CMP [facility] ([id number]) - 250.00/E11.9 - 05/10/2021  o Hgb A1c [facility] (00779) - 250.00/E11.9 - 05/10/2021  o Urine microalbumin (67724) - 250.00/E11.9 - 05/10/2021  o ACO-14: Influenza immunization administered or previously received [facility] () - - 03/15/2021  o ACO-39: Current medications updated and reviewed (, 1159F) - - 03/15/2021  · Medications  o Tresiba FlexTouch U-100 100 unit/mL (3 mL) subcutaneous insulin pen   SIG: inject 30unit SQ QD   DISP: (9) Pre-filled Pen Syringe with 0 refills  Adjusted on 03/15/2021     o Medications have been Reconciled  o Transition of Care or Provider Policy  · Instructions  o Discussed with patient blood pressure monitoring, hemoglobin A1C levels need to be below 7.0, and LDL (Lipid) goals below 70.  o Patient advised to monitor blood pressure (B/P) at home and journal readings. Patient informed that a B/P reading at home of more than 130/80 is considered hypertension. For readings greater bepx971/90 or higher patient is advised to follow up in the office with readings for management. Patient advised to limit sodium intake.  o Advised that cheeses and other sources of dairy fats, animal fats, fast food, and the extras (candy, pastries, pies,  doughnuts and cookies) all contain LDL raising nutrients. Advised to increase fruits, vegetables, whole grains, and to monitor portion sizes.   o Take all medications as prescribed/directed.  o Patient was educated/instructed on their diagnosis, treatment and medications prior to discharge from the clinic today.  o Patient instructed to seek medical attention urgently for new or worsening symptoms.  o Call the office with any concerns or questions.  o Chronic conditions reviewed and taken into consideration for today's treatment plan.  o Discussed Covid-19 precautions including, but not limited to, social distancing, avoid touching your face, and hand washing.   · Disposition  o Call or Return if symptoms worsen or persist.  o Follow Up PRN  o Follow Up in 2 months            Electronically Signed by: CHINA Pinedo -Author on March 15, 2021 12:49:01 PM

## 2021-05-14 NOTE — PROGRESS NOTES
Progress Note      Patient Name: Melania Centeno   Patient ID: [id number]   Sex: Female   YOB: 1950    Primary Care Provider: Kelsey ATKINSON   Referring Provider: Kelsey ATKINSON    Visit Date: January 26, 2021    Provider: CHINA Pinedo   [place] [facility] [place] Address[place] [address] [place] [address], [address]  [place], [place]  [id number]   Location Phone: [phone]          Chief Complaint  · Refills  · Follow-Up      History Of Present Illness  Melania Centeno is a 70 year old /White female who presents for evaluation and treatment of:      Patient is here for a follow up regarding her medications. Patient also needs refills on those medications.  DM2, HTN, HL,GERD- doing well on current meds     Patient says her legs are much better since starting the medication.     Patient does complain of vaginal pain. Patient says she has bumps, like blisters on her legs. Patient also complains of sharp shooting pains in that area and burning and itching. Patient says it has been this bad within the last couple weeks. this has been an off and on problem for several months now.     pt was referred for colonoscopy back in May but cancelled due to not wanting to deal woth all the COVID things. Will re-refer today    on further discussion pt states that she was referred to a GYN several years ago who wanted to fit her with a pessary to help with incontinence but she declined.       Past Medical History  Disease Name Date Onset Notes   Cervical spinal stenosis 04/03/2019 Brisk reflexes suggestive of myelopathy   Cervicalgia --  --    Diabetes mellitus type 2, noninsulin dependent --  --    Fusion of spine of cervical region 06/19/2019 --    GERD (gastroesophageal reflux disease) --  --    Hyperlipidemia --  --    Hypertension, Benign Essential --  --          Past Surgical History  Procedure Name Date Notes   Cervical Fusion 3-14-19 Right C4-5, C5-6    Cholecystecomy --  --    Knee surgery --  --          Medication List  Name Date Started Instructions   amlodipine 5 mg oral tablet 10/14/2020 take 1 tablet (5 mg) by oral route once daily for 90 days   atorvastatin 10 mg oral tablet 10/14/2020 take 1 tablet (10 mg) by oral route once daily at bedtime for 90 days   benazepril 40 mg oral tablet 10/14/2020 take 1 tablet (40 mg) by oral route once daily for 90 days   glipizide 10 mg oral tablet 10/14/2020 take 1 tab BID   hydrocodone-acetaminophen 7.5-325 mg/15 mL oral solution  take 15 milliliters by oral route every 4-6 hours as needed for pain   omeprazole 40 mg oral capsule,delayed release(DR/EC) 10/14/2020 take 1 capsule (40 mg) by oral route once daily before a meal for 90 days   tizanidine 4 mg oral capsule  take 1 capsule (4 mg) by oral route 3 times per day   Tresiba FlexTouch U-100 100 unit/mL (3 mL) subcutaneous insulin pen 10/14/2020 inject 10unit SQ QD         Allergy List  Allergen Name Date Reaction Notes   NO KNOWN DRUG ALLERGIES --  --  --        Allergies Reconciled  Family Medical History  Disease Name Relative/Age Notes   Family history of cancer Sister/   Sister   Family history of heart disease Brother/  Sister/   --    Family history of diabetes mellitus Brother/  Mother/   Mother; Brother         Social History  Finding Status Start/Stop Quantity Notes   Alcohol Never --/-- --  06/19/2019 - 05/09/2019 - 04/03/2019 - does not drink   lives with spouse --  --/-- --  --     --  --/-- --  --    Retired --  --/-- --  --    Tobacco Never --/-- --  never smoker         Immunizations  NameDate Admin Mfg Trade Name Lot Number Route Inj VIS Given VIS Publication   Twthixblx10/25/2020 [facility] Fluzone Quadrivalent [id number] IM LD 11/25/2020 08/15/2019   Comments: Patient tolerated well         Review of Systems  · Constitutional  o Denies  o : fever, fatigue, weight loss, weight gain  · Cardiovascular  o Denies  o : lower extremity edema, claudication,  "chest pressure, palpitations  · Respiratory  o Denies  o : shortness of breath, wheezing, cough, hemoptysis, dyspnea on exertion  · Gastrointestinal  o Denies  o : nausea, vomiting, diarrhea, constipation, abdominal pain      Vitals  Date Time BP Position Site L\R Cuff Size HR RR TEMP (F) WT  HT  BMI kg/m2 BSA m2 O2 Sat FR L/min FiO2 HC       01/26/2021 07:32 /63 Sitting    76 - R   192lbs 0oz 5'  1\" 36.28 1.94 97 %  21%          Physical Examination  · Constitutional  o Appearance  o : well developed, well-nourished, no acute distress  · Head and Face  o Head  o : normocephalic, atraumatic  · Ears, Nose, Mouth and Throat  o Ears  o :   § External Ears  § : external auditory canal appearance normal, no discharge present  § Otoscopic Examination  § : tympanic membranes pearly white/gray bilaterally  o Nose  o :   § External Nose  § : no lesions noted  § Nasopharynx  § : no discharge present  o Oral Cavity  o :   § Oral Mucosa  § : oral mucosa light pink  o Throat  o :   § Oropharynx  § : tonsils without exudate, no palatal petechiae  · Neck  o Inspection/Palpation  o : normal appearance, no masses or tenderness, trachea midline  o Thyroid  o : gland size normal, nontender, no nodules or masses present on palpation  · Respiratory  o Respiratory Effort  o : breathing unlabored  o Inspection of Chest  o : chest rise symmetric bilaterally  o Auscultation of Lungs  o : clear to auscultation bilaterally throughout inspiration and expiration  · Cardiovascular  o Heart  o :   § Auscultation of Heart  § : regular rate and rhythm, no murmurs, gallops or rubs  o Peripheral Vascular System  o :   § Extremities  § : no edema  · Genitourinary  o FEMALE  EXAM:  o :   §  and rectal exam deferred  § :  and rectal exam deferred  o External Genitalia  o : entire extern at genital bright red, multiple small open lesions noted to inner thighs   o Vagina  o : exam with speculum not performed due to sensitivity to touch " "currently, pt unable to tolerate   o Urethra  o :   § Urethral Meatus  § : no inflammation present  § Urethral Body  § : urethra palpation normal  o Bladder  o : nontender to palpation- pt noted to be unknowingly leaking very large amounts of urine while laying on table   o Anus  o : no inflammation or lesions present  o Perineum  o : perineum within normal limits  · Lymphatic  o Neck  o : no cervical lymphadenopathy, no supraclavicular lymphadenopathy  · Psychiatric  o Mood and Affect  o : mood normal, affect appropriate          Assessment  · Screening for colon cancer     V76.51/Z12.11  · Diabetes mellitus, type 2     250.00/E11.9  · Essential hypertension     401.9/I10  · GERD (gastroesophageal reflux disease)     530.81/K21.9  · Hyperlipidemia     272.4/E78.5  · Urinary incontinence     788.30/R32  discussed frequent voiding and double voiding, referral to uro/gyn   · Vaginal irritation     623.9/N89.8  pt to use Aquaphor daily to help protect skin from irritation and empty balder frequently to try to avoid excess urine leakage. keep clean and dry       Plan  · Orders  o COLONOSCOPY REFERRAL (COLON) - V76.51/Z12.11 - 01/26/2021  o Diabetes 2 Panel (Urine Microalbumin, CMP, Lipid, A1c, ) [facility] ([id number], [id number], [id number], [id number]) - 250.00/E11.9 - 01/26/2021  o PODIATRY CONSULTATION (PODIA) - 250.00/E11.9 - 01/26/2021  o ACO-39: Current medications updated and reviewed (1159F, ) - - 01/26/2021  o Urogynecology Consultation (URGYN) - 788.30/R32, 623.9/N89.8 - 01/26/2021   for several months pt has had off and on extreme external vaginal/labial irritation & yeast infections. pt leaking large amts of urine, has been told in past she needed pessary. pt wishes for surgical correction instead    Dr. Kirti Galeana, [place]   · Medications  o pen needle, diabetic 32 gauge x 3/16\" miscellaneous needle   SIG: use as directed   DISP: (90) Pen Needle with 1 refills  Prescribed on 01/26/2021     o fluconazole 150 mg oral tablet "   SIG: take 1 tablet (150 mg) by oral route once, repeat in 72h   DISP: (2) Tablet with 0 refills  Prescribed on 01/26/2021     o amlodipine 5 mg oral tablet   SIG: take 1 tablet (5 mg) by oral route once daily for 90 days   DISP: (90) Tablet with 1 refills  Refilled on 01/26/2021     o atorvastatin 10 mg oral tablet   SIG: take 1 tablet (10 mg) by oral route once daily at bedtime for 90 days   DISP: (90) Tablet with 1 refills  Refilled on 01/26/2021     o benazepril 40 mg oral tablet   SIG: take 1 tablet (40 mg) by oral route once daily for 90 days   DISP: (90) Tablet with 1 refills  Refilled on 01/26/2021     o glipizide 10 mg oral tablet   SIG: take 1 tab BID   DISP: (180) Tablet with 1 refills  Refilled on 01/26/2021     o omeprazole 40 mg oral capsule,delayed release(DR/EC)   SIG: take 1 capsule (40 mg) by oral route once daily before a meal for 90 days   DISP: (90) Capsule with 1 refills  Refilled on 01/26/2021     o Tresiba FlexTouch U-100 100 unit/mL (3 mL) subcutaneous insulin pen   SIG: inject 10unit SQ QD   DISP: (90) Each with 0 refills  Refilled on 01/26/2021     o Medications have been Reconciled  o Transition of Care or Provider Policy  · Instructions  o Patient advised to monitor blood pressure (B/P) at home and journal readings. Patient informed that a B/P reading at home of more than 130/80 is considered hypertension. For readings greater ahwv088/90 or higher patient is advised to follow up in the office with readings for management. Patient advised to limit sodium intake.  o Advised that cheeses and other sources of dairy fats, animal fats, fast food, and the extras (candy, pastries, pies, doughnuts and cookies) all contain LDL raising nutrients. Advised to increase fruits, vegetables, whole grains, and to monitor portion sizes.   o Patient is taking medications as prescribed and doing well.   o Patient was educated/instructed on their diagnosis, treatment and medications prior to discharge from  the clinic today.  o Patient instructed to seek medical attention urgently for new or worsening symptoms.  o Call the office with any concerns or questions.  o Chronic conditions reviewed and taken into consideration for today's treatment plan.  o Discussed Covid-19 precautions including, but not limited to, social distancing, avoid touching your face, and hand washing.   · Disposition  o Call or Return if symptoms worsen or persist.  o Follow Up PRN  o Follow Up in 3 months            Electronically Signed by: CHINA Pinedo -Author on January 26, 2021 08:22:02 AM

## 2021-05-15 VITALS
BODY MASS INDEX: 37.38 KG/M2 | WEIGHT: 198 LBS | TEMPERATURE: 98.1 F | OXYGEN SATURATION: 97 % | HEIGHT: 61 IN | HEART RATE: 86 BPM | DIASTOLIC BLOOD PRESSURE: 63 MMHG | SYSTOLIC BLOOD PRESSURE: 137 MMHG

## 2021-05-15 VITALS
WEIGHT: 214.5 LBS | BODY MASS INDEX: 42.11 KG/M2 | DIASTOLIC BLOOD PRESSURE: 68 MMHG | SYSTOLIC BLOOD PRESSURE: 146 MMHG | HEIGHT: 60 IN | HEART RATE: 70 BPM

## 2021-05-15 VITALS
OXYGEN SATURATION: 98 % | BODY MASS INDEX: 40.84 KG/M2 | SYSTOLIC BLOOD PRESSURE: 139 MMHG | DIASTOLIC BLOOD PRESSURE: 69 MMHG | HEART RATE: 56 BPM | WEIGHT: 208 LBS | HEIGHT: 60 IN

## 2021-05-15 VITALS
HEIGHT: 61 IN | BODY MASS INDEX: 39.5 KG/M2 | DIASTOLIC BLOOD PRESSURE: 74 MMHG | SYSTOLIC BLOOD PRESSURE: 146 MMHG | WEIGHT: 209.25 LBS

## 2021-05-15 VITALS
DIASTOLIC BLOOD PRESSURE: 67 MMHG | HEART RATE: 79 BPM | WEIGHT: 211.19 LBS | SYSTOLIC BLOOD PRESSURE: 141 MMHG | BODY MASS INDEX: 41.46 KG/M2 | HEIGHT: 60 IN

## 2021-05-15 VITALS
DIASTOLIC BLOOD PRESSURE: 63 MMHG | SYSTOLIC BLOOD PRESSURE: 149 MMHG | WEIGHT: 217.06 LBS | BODY MASS INDEX: 40.98 KG/M2 | HEIGHT: 61 IN

## 2021-05-15 VITALS
HEIGHT: 60 IN | WEIGHT: 213.5 LBS | SYSTOLIC BLOOD PRESSURE: 140 MMHG | BODY MASS INDEX: 41.91 KG/M2 | DIASTOLIC BLOOD PRESSURE: 61 MMHG

## 2021-05-17 ENCOUNTER — HOSPITAL ENCOUNTER (OUTPATIENT)
Dept: LAB | Facility: HOSPITAL | Age: 71
Discharge: HOME | End: 2021-05-17
Attending: NURSE PRACTITIONER

## 2021-05-17 LAB
ALBUMIN SERPL-MCNC: 4.1 G/DL (ref 3.5–5)
ALBUMIN/GLOB SERPL: 1.1 {RATIO} (ref 1.4–2.6)
ALP SERPL-CCNC: 153 U/L (ref 43–160)
ALT SERPL-CCNC: 35 U/L (ref 10–40)
ANION GAP SERPL CALC-SCNC: 19 MMOL/L (ref 8–19)
AST SERPL-CCNC: 43 U/L (ref 15–50)
BILIRUB SERPL-MCNC: 0.77 MG/DL (ref 0.2–1.3)
BUN SERPL-MCNC: 14 MG/DL (ref 5–25)
BUN/CREAT SERPL: 17 {RATIO} (ref 6–20)
CALCIUM SERPL-MCNC: 9.3 MG/DL (ref 8.7–10.4)
CHLORIDE SERPL-SCNC: 97 MMOL/L (ref 99–111)
CONV CO2: 24 MMOL/L (ref 22–32)
CONV CREATININE URINE, RANDOM: 171.2 MG/DL (ref 10–300)
CONV MICROALBUM.,U,RANDOM: 44.9 MG/L (ref 0–20)
CONV TOTAL PROTEIN: 8 G/DL (ref 6.3–8.2)
CREAT UR-MCNC: 0.82 MG/DL (ref 0.5–0.9)
EST. AVERAGE GLUCOSE BLD GHB EST-MCNC: 249 MG/DL
GFR SERPLBLD BASED ON 1.73 SQ M-ARVRAT: >60 ML/MIN/{1.73_M2}
GLOBULIN UR ELPH-MCNC: 3.9 G/DL (ref 2–3.5)
GLUCOSE SERPL-MCNC: 310 MG/DL (ref 65–99)
HBA1C MFR BLD: 10.3 % (ref 3.5–5.7)
MICROALBUMIN/CREAT UR: 26.2 MG/G{CRE} (ref 0–35)
OSMOLALITY SERPL CALC.SUM OF ELEC: 294 MOSM/KG (ref 273–304)
POTASSIUM SERPL-SCNC: 4.2 MMOL/L (ref 3.5–5.3)
SODIUM SERPL-SCNC: 136 MMOL/L (ref 135–147)

## 2021-05-22 ENCOUNTER — TRANSCRIBE ORDERS (OUTPATIENT)
Dept: ADMINISTRATIVE | Facility: HOSPITAL | Age: 71
End: 2021-05-22

## 2021-05-22 DIAGNOSIS — Z92.89 HISTORY OF MAMMOGRAPHY, SCREENING: Primary | ICD-10-CM

## 2021-06-28 DIAGNOSIS — E78.5 HYPERLIPIDEMIA, UNSPECIFIED HYPERLIPIDEMIA TYPE: ICD-10-CM

## 2021-06-28 DIAGNOSIS — I10 ESSENTIAL HYPERTENSION: Primary | ICD-10-CM

## 2021-06-28 DIAGNOSIS — E11.65 TYPE 2 DIABETES MELLITUS WITH HYPERGLYCEMIA, WITHOUT LONG-TERM CURRENT USE OF INSULIN (HCC): ICD-10-CM

## 2021-06-28 DIAGNOSIS — K21.9 GASTROESOPHAGEAL REFLUX DISEASE, UNSPECIFIED WHETHER ESOPHAGITIS PRESENT: ICD-10-CM

## 2021-06-28 RX ORDER — AMLODIPINE BESYLATE 5 MG/1
TABLET ORAL
Qty: 90 TABLET | Refills: 1 | OUTPATIENT
Start: 2021-06-28

## 2021-06-28 RX ORDER — BENAZEPRIL HYDROCHLORIDE 40 MG/1
TABLET, FILM COATED ORAL
Qty: 90 TABLET | Refills: 1 | OUTPATIENT
Start: 2021-06-28

## 2021-06-28 RX ORDER — PEN NEEDLE, DIABETIC 32 GX3/16"
NEEDLE, DISPOSABLE MISCELLANEOUS
Qty: 100 EACH | Refills: 1 | OUTPATIENT
Start: 2021-06-28

## 2021-06-28 RX ORDER — GLIPIZIDE 10 MG/1
TABLET ORAL
Qty: 180 TABLET | Refills: 1 | OUTPATIENT
Start: 2021-06-28

## 2021-06-28 RX ORDER — OMEPRAZOLE 40 MG/1
CAPSULE, DELAYED RELEASE ORAL
Qty: 90 CAPSULE | Refills: 1 | OUTPATIENT
Start: 2021-06-28

## 2021-06-28 RX ORDER — ATORVASTATIN CALCIUM 10 MG/1
TABLET, FILM COATED ORAL
Qty: 90 TABLET | Refills: 1 | OUTPATIENT
Start: 2021-06-28

## 2021-07-29 RX ORDER — ATORVASTATIN CALCIUM 10 MG/1
10 TABLET, FILM COATED ORAL DAILY
COMMUNITY
Start: 2021-04-29 | End: 2021-08-05 | Stop reason: SDUPTHER

## 2021-07-29 RX ORDER — HYDROCODONE BITARTRATE AND ACETAMINOPHEN 7.5; 325 MG/1; MG/1
1 TABLET ORAL EVERY 6 HOURS PRN
COMMUNITY
End: 2022-06-28

## 2021-07-29 RX ORDER — MIRABEGRON 50 MG/1
1 TABLET, FILM COATED, EXTENDED RELEASE ORAL DAILY
COMMUNITY
Start: 2021-04-21 | End: 2021-08-05

## 2021-07-29 RX ORDER — TIZANIDINE 4 MG/1
4 TABLET ORAL NIGHTLY PRN
COMMUNITY
End: 2022-06-28

## 2021-08-05 ENCOUNTER — OFFICE VISIT (OUTPATIENT)
Dept: FAMILY MEDICINE CLINIC | Facility: CLINIC | Age: 71
End: 2021-08-05

## 2021-08-05 VITALS
BODY MASS INDEX: 37.08 KG/M2 | RESPIRATION RATE: 18 BRPM | OXYGEN SATURATION: 95 % | HEART RATE: 65 BPM | HEIGHT: 61 IN | DIASTOLIC BLOOD PRESSURE: 48 MMHG | SYSTOLIC BLOOD PRESSURE: 117 MMHG | WEIGHT: 196.4 LBS

## 2021-08-05 VITALS — DIASTOLIC BLOOD PRESSURE: 68 MMHG | SYSTOLIC BLOOD PRESSURE: 122 MMHG

## 2021-08-05 DIAGNOSIS — E11.65 TYPE 2 DIABETES MELLITUS WITH HYPERGLYCEMIA, WITHOUT LONG-TERM CURRENT USE OF INSULIN (HCC): ICD-10-CM

## 2021-08-05 DIAGNOSIS — K21.9 GASTROESOPHAGEAL REFLUX DISEASE, UNSPECIFIED WHETHER ESOPHAGITIS PRESENT: ICD-10-CM

## 2021-08-05 DIAGNOSIS — Z00.00 INITIAL MEDICARE ANNUAL WELLNESS VISIT: ICD-10-CM

## 2021-08-05 DIAGNOSIS — Z12.11 COLON CANCER SCREENING: Primary | ICD-10-CM

## 2021-08-05 DIAGNOSIS — I10 ESSENTIAL HYPERTENSION: ICD-10-CM

## 2021-08-05 DIAGNOSIS — E78.2 MIXED HYPERLIPIDEMIA: Primary | ICD-10-CM

## 2021-08-05 DIAGNOSIS — M25.552 LEFT HIP PAIN: ICD-10-CM

## 2021-08-05 PROCEDURE — 96160 PT-FOCUSED HLTH RISK ASSMT: CPT | Performed by: NURSE PRACTITIONER

## 2021-08-05 PROCEDURE — 1170F FXNL STATUS ASSESSED: CPT | Performed by: NURSE PRACTITIONER

## 2021-08-05 PROCEDURE — 99214 OFFICE O/P EST MOD 30 MIN: CPT | Performed by: NURSE PRACTITIONER

## 2021-08-05 PROCEDURE — 1159F MED LIST DOCD IN RCRD: CPT | Performed by: NURSE PRACTITIONER

## 2021-08-05 PROCEDURE — 96372 THER/PROPH/DIAG INJ SC/IM: CPT | Performed by: NURSE PRACTITIONER

## 2021-08-05 PROCEDURE — G0438 PPPS, INITIAL VISIT: HCPCS | Performed by: NURSE PRACTITIONER

## 2021-08-05 RX ORDER — BENAZEPRIL HYDROCHLORIDE 40 MG/1
40 TABLET, FILM COATED ORAL DAILY
Qty: 90 TABLET | Refills: 1 | Status: SHIPPED | OUTPATIENT
Start: 2021-08-05 | End: 2021-12-22 | Stop reason: SDUPTHER

## 2021-08-05 RX ORDER — ATORVASTATIN CALCIUM 10 MG/1
10 TABLET, FILM COATED ORAL DAILY
Qty: 90 TABLET | Refills: 1 | Status: SHIPPED | OUTPATIENT
Start: 2021-08-05 | End: 2021-12-22 | Stop reason: SDUPTHER

## 2021-08-05 RX ORDER — KETOROLAC TROMETHAMINE 30 MG/ML
60 INJECTION, SOLUTION INTRAMUSCULAR; INTRAVENOUS ONCE
Status: COMPLETED | OUTPATIENT
Start: 2021-08-05 | End: 2021-08-05

## 2021-08-05 RX ORDER — GLIPIZIDE 10 MG/1
10 TABLET ORAL
Qty: 180 TABLET | Refills: 1 | Status: SHIPPED | OUTPATIENT
Start: 2021-08-05 | End: 2021-12-22 | Stop reason: SDUPTHER

## 2021-08-05 RX ORDER — OMEPRAZOLE 40 MG/1
40 CAPSULE, DELAYED RELEASE ORAL DAILY
Qty: 90 CAPSULE | Refills: 1 | Status: SHIPPED | OUTPATIENT
Start: 2021-08-05 | End: 2021-12-22 | Stop reason: SDUPTHER

## 2021-08-05 RX ORDER — PEN NEEDLE, DIABETIC 32 GX3/16"
NEEDLE, DISPOSABLE MISCELLANEOUS
COMMUNITY
Start: 2021-04-29 | End: 2021-12-23 | Stop reason: SDUPTHER

## 2021-08-05 RX ORDER — GLIPIZIDE 10 MG/1
10 TABLET ORAL
COMMUNITY
End: 2021-08-05

## 2021-08-05 RX ORDER — DULOXETIN HYDROCHLORIDE 60 MG/1
60 CAPSULE, DELAYED RELEASE ORAL
COMMUNITY
Start: 2021-07-19 | End: 2021-10-30

## 2021-08-05 RX ORDER — AMLODIPINE BESYLATE 5 MG/1
5 TABLET ORAL DAILY
Qty: 90 TABLET | Refills: 1 | Status: SHIPPED | OUTPATIENT
Start: 2021-08-05 | End: 2021-12-22 | Stop reason: SDUPTHER

## 2021-08-05 RX ADMIN — KETOROLAC TROMETHAMINE 60 MG: 30 INJECTION, SOLUTION INTRAMUSCULAR; INTRAVENOUS at 15:29

## 2021-08-05 NOTE — PROGRESS NOTES
The ABCs of the Annual Wellness Visit  Initial Medicare Wellness Visit    Chief Complaint   Patient presents with   • Medicare Wellness-Initial Visit       Subjective   History of Present Illness:  Melania Centeno is a 71 y.o. female who presents for an Initial Medicare Wellness Visit.    HEALTH RISK ASSESSMENT    Recent Hospitalizations:  No hospitalization(s) within the last year.    Current Medical Providers:  Patient Care Team:  Kelsey Hoyos APRN as PCP - General (Nurse Practitioner)    Smoking Status:  Social History     Tobacco Use   Smoking Status Never Smoker   Smokeless Tobacco Never Used       Alcohol Consumption:  Social History     Substance and Sexual Activity   Alcohol Use Never       Depression Screen:   PHQ-2/PHQ-9 Depression Screening 8/5/2021   Little interest or pleasure in doing things 1   Feeling down, depressed, or hopeless 1   Trouble falling or staying asleep, or sleeping too much 1   Feeling tired or having little energy 1   Poor appetite or overeating 0   Feeling bad about yourself - or that you are a failure or have let yourself or your family down 0   Trouble concentrating on things, such as reading the newspaper or watching television 0   Moving or speaking so slowly that other people could have noticed. Or the opposite - being so fidgety or restless that you have been moving around a lot more than usual 0   Thoughts that you would be better off dead, or of hurting yourself in some way 0   Total Score 4   If you checked off any problems, how difficult have these problems made it for you to do your work, take care of things at home, or get along with other people? Somewhat difficult       Fall Risk Screen:  STEADI Fall Risk Assessment was completed, and patient is at MODERATE risk for falls. Assessment completed on:8/5/2021    Health Habits and Functional and Cognitive Screening:  Functional & Cognitive Status 8/5/2021   Do you have difficulty preparing food and eating? No   Do  you have difficulty bathing yourself, getting dressed or grooming yourself? No   Do you have difficulty using the toilet? No   Do you have difficulty moving around from place to place? No   Do you have trouble with steps or getting out of a bed or a chair? No   Current Diet Unhealthy Diet   Dental Exam Not up to date   Eye Exam Not up to date   Exercise (times per week) 4 times per week   Current Exercises Include Stationary Bicycling/Spin Class   Do you need help using the phone?  No   Are you deaf or do you have serious difficulty hearing?  No   Do you need help with transportation? No   Do you need help shopping? No   Do you need help preparing meals?  No   Do you need help with housework?  No   Do you need help with laundry? No   Do you need help taking your medications? No   Do you need help managing money? No   Do you ever drive or ride in a car without wearing a seat belt? Yes   Have you felt unusual stress, anger or loneliness in the last month? Yes   Who do you live with? Spouse   If you need help, do you have trouble finding someone available to you? Yes   Have you been bothered in the last four weeks by sexual problems? No         Does the patient have evidence of cognitive impairment? No    Asprin use counseling:Does not need ASA (and currently is not on it)    Age-appropriate Screening Schedule:  Refer to the list below for future screening recommendations based on patient's age, sex and/or medical conditions. Orders for these recommended tests are listed in the plan section. The patient has been provided with a written plan.    Health Maintenance   Topic Date Due   • DXA SCAN  Never done   • TDAP/TD VACCINES (1 - Tdap) Never done   • ZOSTER VACCINE (1 of 2) Never done   • MAMMOGRAM  Never done   • DIABETIC EYE EXAM  Never done   • INFLUENZA VACCINE  10/01/2021   • HEMOGLOBIN A1C  11/17/2021   • DIABETIC FOOT EXAM  02/25/2022   • LIPID PANEL  03/09/2022   • URINE MICROALBUMIN  05/17/2022          The  following portions of the patient's history were reviewed and updated as appropriate: allergies, current medications, past family history, past medical history, past social history, past surgical history and problem list.    Outpatient Medications Prior to Visit   Medication Sig Dispense Refill   • amLODIPine (NORVASC) 5 MG tablet      • atorvastatin (LIPITOR) 10 MG tablet Take 10 mg by mouth Daily.     • benazepril (LOTENSIN) 40 MG tablet      • Droplet Pen Needles 32G X 5 MM misc      • DULoxetine (CYMBALTA) 60 MG capsule Take 60 mg by mouth every night at bedtime.     • ESTRACE VAGINAL 0.1 MG/GM vaginal cream Insert 2 g into the vagina 3 (Three) Times a Week. 42.5 g 3   • glipiZIDE (GLUCOTROL XL) 10 MG 24 hr tablet      • HYDROcodone-acetaminophen (NORCO) 7.5-325 MG per tablet Take 1 tablet by mouth Every 6 (Six) Hours As Needed.     • insulin degludec (TRESIBA FLEXTOUCH) 100 UNIT/ML solution pen-injector injection Inject 30 Units under the skin into the appropriate area as directed Daily.     • omeprazole (priLOSEC) 40 MG capsule      • tiZANidine (ZANAFLEX) 4 MG tablet Take 4 mg by mouth At Night As Needed.     • Myrbetriq 50 MG tablet sustained-release 24 hour 24 hr tablet Take 1 tablet by mouth Daily.     • oxybutynin (DITROPAN) 5 MG tablet Take 1 tablet by mouth 2 (Two) Times a Day. 60 tablet 1     No facility-administered medications prior to visit.       Patient Active Problem List   Diagnosis   • Pelvic organ prolapse quantification stage 3 cystocele   • Pelvic organ prolapse quantification stage 1 rectocele   • Uterine prolapse   • Urge incontinence   • Atrophic vaginitis       Advanced Care Planning:  ACP discussion was held with the patient during this visit. Patient does not have an advance directive, declines further assistance.    Review of Systems    Compared to one year ago, the patient feels her physical health is the same.  Compared to one year ago, the patient feels her mental health is the  "same.    Reviewed chart for potential of high risk medication in the elderly: yes  Reviewed chart for potential of harmful drug interactions in the elderly:yes    Objective         Vitals:    08/05/21 1341   BP: 117/48   BP Location: Left arm   Patient Position: Sitting   Cuff Size: Large Adult   Pulse: 65   Resp: 18   SpO2: 95%   Weight: 89.1 kg (196 lb 6.4 oz)   Height: 154.9 cm (61\")       Body mass index is 37.11 kg/m².  Discussed the patient's BMI with her. The BMI is above average; BMI management plan is completed.    Physical Exam    Lab Results   Component Value Date     (H) 05/17/2021    HGBA1C 10.3 (H) 05/17/2021        Assessment/Plan   Medicare Risks and Personalized Health Plan  CMS Preventative Services Quick Reference  Advance Directive Discussion  Breast Cancer/Mammogram Screening  Colon Cancer Screening  Obesity/Overweight     Pt states was due for colonoscopy but got sick after drinking prep and couldn't make it. Has never been re-scheduled, this was several months ago. We will re-refer for this today.   The above risks/problems have been discussed with the patient.  Pertinent information has been shared with the patient in the After Visit Summary.  Follow up plans and orders are seen below in the Assessment/Plan Section.    There are no diagnoses linked to this encounter.  Follow Up:  No follow-ups on file.     An After Visit Summary and PPPS were given to the patient.             "

## 2021-08-05 NOTE — PROGRESS NOTES
The ABCs of the Annual Wellness Visit  Initial Medicare Wellness Visit    Chief Complaint   Patient presents with   • Medicare Wellness-Initial Visit       Subjective   History of Present Illness:  Melania Centeno is a 71 y.o. female who presents for an Initial Medicare Wellness Visit.    HEALTH RISK ASSESSMENT    Recent Hospitalizations:  No hospitalization(s) within the last year.    Current Medical Providers:  Patient Care Team:  Kelsey Hoyos APRN as PCP - General (Nurse Practitioner)    Smoking Status:  Social History     Tobacco Use   Smoking Status Never Smoker   Smokeless Tobacco Never Used       Alcohol Consumption:  Social History     Substance and Sexual Activity   Alcohol Use Never       Depression Screen:   PHQ-2/PHQ-9 Depression Screening 8/5/2021   Little interest or pleasure in doing things 1   Feeling down, depressed, or hopeless 1   Trouble falling or staying asleep, or sleeping too much 1   Feeling tired or having little energy 1   Poor appetite or overeating 0   Feeling bad about yourself - or that you are a failure or have let yourself or your family down 0   Trouble concentrating on things, such as reading the newspaper or watching television 0   Moving or speaking so slowly that other people could have noticed. Or the opposite - being so fidgety or restless that you have been moving around a lot more than usual 0   Thoughts that you would be better off dead, or of hurting yourself in some way 0   Total Score 4   If you checked off any problems, how difficult have these problems made it for you to do your work, take care of things at home, or get along with other people? Somewhat difficult       Fall Risk Screen:  STEADI Fall Risk Assessment was completed, and patient is at MODERATE risk for falls. Assessment completed on:8/5/2021    Health Habits and Functional and Cognitive Screening:  Functional & Cognitive Status 8/5/2021   Do you have difficulty preparing food and eating? No   Do  you have difficulty bathing yourself, getting dressed or grooming yourself? No   Do you have difficulty using the toilet? No   Do you have difficulty moving around from place to place? No   Do you have trouble with steps or getting out of a bed or a chair? No   Current Diet Unhealthy Diet   Dental Exam Not up to date   Eye Exam Not up to date   Exercise (times per week) 4 times per week   Current Exercises Include Stationary Bicycling/Spin Class   Do you need help using the phone?  No   Are you deaf or do you have serious difficulty hearing?  No   Do you need help with transportation? No   Do you need help shopping? No   Do you need help preparing meals?  No   Do you need help with housework?  No   Do you need help with laundry? No   Do you need help taking your medications? No   Do you need help managing money? No   Do you ever drive or ride in a car without wearing a seat belt? Yes   Have you felt unusual stress, anger or loneliness in the last month? Yes   Who do you live with? Spouse   If you need help, do you have trouble finding someone available to you? Yes   Have you been bothered in the last four weeks by sexual problems? No         Does the patient have evidence of cognitive impairment? No    Asprin use counseling:Does not need ASA (and currently is not on it)    Age-appropriate Screening Schedule:  Refer to the list below for future screening recommendations based on patient's age, sex and/or medical conditions. Orders for these recommended tests are listed in the plan section. The patient has been provided with a written plan.    Health Maintenance   Topic Date Due   • DXA SCAN  Never done   • TDAP/TD VACCINES (1 - Tdap) Never done   • ZOSTER VACCINE (1 of 2) Never done   • MAMMOGRAM  Never done   • DIABETIC EYE EXAM  Never done   • INFLUENZA VACCINE  10/01/2021   • HEMOGLOBIN A1C  11/17/2021   • DIABETIC FOOT EXAM  02/25/2022   • LIPID PANEL  03/09/2022   • URINE MICROALBUMIN  05/17/2022          The  following portions of the patient's history were reviewed and updated as appropriate: allergies, current medications, past family history, past medical history, past social history, past surgical history and problem list.    Outpatient Medications Prior to Visit   Medication Sig Dispense Refill   • Droplet Pen Needles 32G X 5 MM misc      • DULoxetine (CYMBALTA) 60 MG capsule Take 60 mg by mouth every night at bedtime.     • ESTRACE VAGINAL 0.1 MG/GM vaginal cream Insert 2 g into the vagina 3 (Three) Times a Week. 42.5 g 3   • HYDROcodone-acetaminophen (NORCO) 7.5-325 MG per tablet Take 1 tablet by mouth Every 6 (Six) Hours As Needed.     • tiZANidine (ZANAFLEX) 4 MG tablet Take 4 mg by mouth At Night As Needed.     • amLODIPine (NORVASC) 5 MG tablet      • atorvastatin (LIPITOR) 10 MG tablet Take 10 mg by mouth Daily.     • benazepril (LOTENSIN) 40 MG tablet      • glipiZIDE (GLUCOTROL XL) 10 MG 24 hr tablet      • insulin degludec (TRESIBA FLEXTOUCH) 100 UNIT/ML solution pen-injector injection Inject 30 Units under the skin into the appropriate area as directed Daily.     • omeprazole (priLOSEC) 40 MG capsule      • Myrbetriq 50 MG tablet sustained-release 24 hour 24 hr tablet Take 1 tablet by mouth Daily.     • oxybutynin (DITROPAN) 5 MG tablet Take 1 tablet by mouth 2 (Two) Times a Day. 60 tablet 1     No facility-administered medications prior to visit.       Patient Active Problem List   Diagnosis   • Pelvic organ prolapse quantification stage 3 cystocele   • Pelvic organ prolapse quantification stage 1 rectocele   • Uterine prolapse   • Urge incontinence   • Atrophic vaginitis   • Left hip pain   • Gastroesophageal reflux disease   • Type 2 diabetes mellitus with hyperglycemia, without long-term current use of insulin (CMS/[facility])   • Essential hypertension   • Mixed hyperlipidemia       Advanced Care Planning:  ACP discussion was held with the patient during this visit. Patient does not have an advance  "directive, declines further assistance.    Review of Systems    Compared to one year ago, the patient feels her physical health is the same.  Compared to one year ago, the patient feels her mental health is the same.    Reviewed chart for potential of high risk medication in the elderly: yes  Reviewed chart for potential of harmful drug interactions in the elderly:yes    Objective         Vitals:    08/05/21 1341   BP: 117/48   BP Location: Left arm   Patient Position: Sitting   Cuff Size: Large Adult   Pulse: 65   Resp: 18   SpO2: 95%   Weight: 89.1 kg (196 lb 6.4 oz)   Height: 154.9 cm (61\")   PainSc:   6   PainLoc: Hip       Body mass index is 37.11 kg/m².  Discussed the patient's BMI with her. The BMI is above average; BMI management plan is completed.    Physical Exam          Assessment/Plan   Medicare Risks and Personalized Health Plan  CMS Preventative Services Quick Reference  Advance Directive Discussion  Breast Cancer/Mammogram Screening  Colon Cancer Screening  Obesity/Overweight     Pt states was due for colonoscopy but got sick after drinking prep and couldn't make it. Has never been re-scheduled, this was several months ago. We will re-refer for this today.   The above risks/problems have been discussed with the patient.  Pertinent information has been shared with the patient in the After Visit Summary.  Follow up plans and orders are seen below in the Assessment/Plan Section.    Diagnoses and all orders for this visit:    1. Colon cancer screening (Primary)  -     Ambulatory Referral For Screening Colonoscopy    2. Initial Medicare annual wellness visit      Follow Up:  No follow-ups on file.     An After Visit Summary and PPPS were given to the patient.             "

## 2021-08-05 NOTE — ASSESSMENT & PLAN NOTE
Hypertension is Stable, well controlled.  Continue current treatment regimen.  Continue current medications.  Blood pressure will be reassessed at the next regular appointment.

## 2021-08-05 NOTE — PROGRESS NOTES
Chief Complaint  Hyperlipidemia and Diabetes    Subjective          Melania Centeno presents to [facility] for   History of Present Illness    Patient is here for a follow up on DM2, HLD.     Patient has some concerns with left hip/leg pain since Sunday. Patient states she can hardly walk and it is making her unable to do daily activities. Patient describes the pain as an ache and tender to the touch. States feels different than her typical sciatica. Pt does see pain management and is on hydrocodone, but states that does not touch this pain. States it feels like nerve pain, it is sensitive to light touch, like a burn, but it also throbs.  Patient denies any potential known injury.  States she just literally woke up like this.  Patient states pain is at a level 6 currently because she has been icing the area and taking Tylenol.  States without it pain is an 8 or 9.    DM2- last A1C was still 10, poorly controlled, pt was instructed to start titrating up on dosage of Tresiba. Tresiba dose was up to 50 units but then started running out of meds so she has cut back to 15 units daily for about the last 3 weeks.   Medical History  Past Medical History:   Diagnosis Date   • Cervical spinal stenosis 04/03/2019    Brisk reflexes suggestive of myelopathy   • Cervicalgia    • Diverticulitis of colon    • Foot pain    • Fusion of spine of cervical region 06/19/2019   • GERD (gastroesophageal reflux disease)    • Hyperlipidemia    • Hypertension    • Type 2 diabetes mellitus (CMS/[facility])      Surgical History  Past Surgical History:   Procedure Laterality Date   • BREAST BIOPSY     • CERVICAL FUSION  03/14/2019    right C4-5,C5-6   • CHOLECYSTECTOMY     • KNEE SURGERY     • TUBAL ABDOMINAL LIGATION       Social History  Social History     Socioeconomic History   • Marital status:      Spouse name: Not on file   • Number of children: Not on file   • Years of education: Not on file   • Highest  education level: Not on file   Tobacco Use   • Smoking status: Never Smoker   • Smokeless tobacco: Never Used   Vaping Use   • Vaping Use: Never used   Substance and Sexual Activity   • Alcohol use: Never   • Drug use: No   • Sexual activity: Defer       Current Outpatient Medications:   •  amLODIPine (NORVASC) 5 MG tablet, Take 1 tablet by mouth Daily., Disp: 90 tablet, Rfl: 1  •  atorvastatin (LIPITOR) 10 MG tablet, Take 1 tablet by mouth Daily., Disp: 90 tablet, Rfl: 1  •  benazepril (LOTENSIN) 40 MG tablet, Take 1 tablet by mouth Daily., Disp: 90 tablet, Rfl: 1  •  Droplet Pen Needles 32G X 5 MM misc, , Disp: , Rfl:   •  DULoxetine (CYMBALTA) 60 MG capsule, Take 60 mg by mouth every night at bedtime., Disp: , Rfl:   •  ESTRACE VAGINAL 0.1 MG/GM vaginal cream, Insert 2 g into the vagina 3 (Three) Times a Week., Disp: 42.5 g, Rfl: 3  •  glipizide (GLUCOTROL) 10 MG tablet, Take 1 tablet by mouth 2 (Two) Times a Day Before Meals., Disp: 180 tablet, Rfl: 1  •  HYDROcodone-acetaminophen (NORCO) 7.5-325 MG per tablet, Take 1 tablet by mouth Every 6 (Six) Hours As Needed., Disp: , Rfl:   •  insulin degludec (TRESIBA FLEXTOUCH) 100 UNIT/ML solution pen-injector injection, Inject 50 Units under the skin into the appropriate area as directed Daily., Disp: 15 pen, Rfl: 1  •  omeprazole (priLOSEC) 40 MG capsule, Take 1 capsule by mouth Daily., Disp: 90 capsule, Rfl: 1  •  tiZANidine (ZANAFLEX) 4 MG tablet, Take 4 mg by mouth At Night As Needed., Disp: , Rfl:     Current Facility-Administered Medications:   •  ketorolac (TORADOL) injection 60 mg, 60 mg, Intramuscular, Once, Kelsey Hoyos, APRN    Review of Systems     Objective     /68     There is no height or weight on file to calculate BMI.    Physical Exam  Vitals reviewed.   Constitutional:       Appearance: Normal appearance. She is well-developed.   HENT:      Head: Normocephalic and atraumatic.      Right Ear: External ear normal.      Left Ear: External  ear normal.      Mouth/Throat:      Pharynx: No oropharyngeal exudate.   Eyes:      Conjunctiva/sclera: Conjunctivae normal.      Pupils: Pupils are equal, round, and reactive to light.   Cardiovascular:      Rate and Rhythm: Normal rate and regular rhythm.      Heart sounds: No murmur heard.   No friction rub. No gallop.    Pulmonary:      Effort: Pulmonary effort is normal.      Breath sounds: Normal breath sounds. No wheezing or rhonchi.   Musculoskeletal:      Left hip: Tenderness and bony tenderness present. Normal range of motion.      Comments: Exam of skin on left hip reveals no erythema or signs of potential rash, skin is somewhat tender to touch.  Patient is very tender to palpation over greater trochanter   Skin:     General: Skin is warm and dry.   Neurological:      Mental Status: She is alert and oriented to person, place, and time.      Cranial Nerves: No cranial nerve deficit.   Psychiatric:         Mood and Affect: Mood and affect normal.         Behavior: Behavior normal.         Thought Content: Thought content normal.         Judgment: Judgment normal.         /68     Result Review :     The following data was reviewed by: CHINA Pinedo on 08/05/2021:    CMP    CMP 3/16/21 3/29/21 3/29/21 5/17/21     1332 1332    Glucose 307 (A)   310 (A)   BUN 11   14   Creatinine 0.62   0.82   Sodium 133 (A)   136   Potassium 4.0   4.2   Chloride 95 (A)   97 (A)   Calcium 9.3   9.3   Albumin 4.2 3.9 3.6 4.1   Total Bilirubin 0.62 0.48  0.77   Alkaline Phosphatase 229 (A) 211 (A)  153   AST (SGOT) 34 34  43   ALT (SGPT) 29 34  35   (A) Abnormal value            CBC    CBC 11/25/20 3/16/21   WBC 6.37 10.09   RBC 4.83 5.19   Hemoglobin 14.6 15.5   Hematocrit 45.0 45.8   MCV 93.2 88.2   MCH 30.2 29.9   MCHC 32.4 (A) 33.8   RDW 13.2 13.1   Platelets 156 164   (A) Abnormal value            Lipid Panel    Lipid Panel 2/4/21   Total Cholesterol 148   Triglycerides 97   HDL Cholesterol 71 (A)   VLDL  Cholesterol 19   LDL Cholesterol  58 (A)   (A) Abnormal value       Comments are available for some flowsheets but are not being displayed.           Most Recent A1C    HGBA1C Most Recent 5/17/21   Hemoglobin A1C 10.3 (A)   (A) Abnormal value       Comments are available for some flowsheets but are not being displayed.                              Assessment:  Diagnoses and all orders for this visit:    1. Mixed hyperlipidemia (Primary)  Assessment & Plan:  Lipid abnormalities are Stable, well controlled.  Pharmacotherapy as ordered.  Lipids will be reassessed in 6 months.    Orders:  -     atorvastatin (LIPITOR) 10 MG tablet; Take 1 tablet by mouth Daily.  Dispense: 90 tablet; Refill: 1  -     glipizide (GLUCOTROL) 10 MG tablet; Take 1 tablet by mouth 2 (Two) Times a Day Before Meals.  Dispense: 180 tablet; Refill: 1    2. Essential hypertension  Assessment & Plan:  Hypertension is Stable, well controlled.  Continue current treatment regimen.  Continue current medications.  Blood pressure will be reassessed at the next regular appointment.    Orders:  -     amLODIPine (NORVASC) 5 MG tablet; Take 1 tablet by mouth Daily.  Dispense: 90 tablet; Refill: 1  -     benazepril (LOTENSIN) 40 MG tablet; Take 1 tablet by mouth Daily.  Dispense: 90 tablet; Refill: 1    3. Type 2 diabetes mellitus with hyperglycemia, without long-term current use of insulin (CMS/[facility])  Assessment & Plan:  Diabetes is unchanged.   Continue current treatment regimen.  Regular aerobic exercise.  Patient's A1c is currently 10.3 and very poorly controlled.  Patient states that it does not seem to matter what she does or what she eats her blood sugar does not improve.  Patient has been titrating up on Tresiba and is now at 50 units(other than for the last 3 weeks when she was running out of insulin so she cut her dose down to 15 units) if A1c has not improved significantly then we will refer to endocrinology for further evaluation.  Diabetes will be  reassessed in 3 months.    Orders:  -     insulin degludec (TRESIBA FLEXTOUCH) 100 UNIT/ML solution pen-injector injection; Inject 50 Units under the skin into the appropriate area as directed Daily.  Dispense: 15 pen; Refill: 1  -     Comprehensive Metabolic Panel; Future  -     Lipid Panel; Future  -     Hemoglobin A1c; Future  -     Microalbumin / Creatinine Urine Ratio - Urine, Clean Catch; Future    4. Gastroesophageal reflux disease, unspecified whether esophagitis present  -     omeprazole (priLOSEC) 40 MG capsule; Take 1 capsule by mouth Daily.  Dispense: 90 capsule; Refill: 1    5. Left hip pain  -     XR Hip With or Without Pelvis 2 - 3 View Left; Future  -     ketorolac (TORADOL) injection 60 mg      Plan:   We will give her a Toradol shot today and she may start a trial of Mobic for pain control of hip tomorrow.  We will obtain x-ray for further evaluation.  Given the significant tenderness over the greater trochanter I am suspicious of possible bursitis          Follow Up     Return in about 3 months (around 11/5/2021).    Patient was given instructions and counseling regarding her condition or for health maintenance advice. Please see specific information pulled into the AVS if appropriate.     Kelsey Hoyos, APRN  08/05/2021

## 2021-08-05 NOTE — ASSESSMENT & PLAN NOTE
Lipid abnormalities are Stable, well controlled.  Pharmacotherapy as ordered.  Lipids will be reassessed in 6 months.

## 2021-08-05 NOTE — ASSESSMENT & PLAN NOTE
Diabetes is unchanged.   Continue current treatment regimen.  Regular aerobic exercise.  Patient's A1c is currently 10.3 and very poorly controlled.  Patient states that it does not seem to matter what she does or what she eats her blood sugar does not improve.  Patient has been titrating up on Tresiba and is now at 50 units(other than for the last 3 weeks when she was running out of insulin so she cut her dose down to 15 units) if A1c has not improved significantly then we will refer to endocrinology for further evaluation.  Diabetes will be reassessed in 3 months.

## 2021-08-09 ENCOUNTER — TELEPHONE (OUTPATIENT)
Dept: FAMILY MEDICINE CLINIC | Facility: CLINIC | Age: 71
End: 2021-08-09

## 2021-08-09 DIAGNOSIS — M25.552 LEFT HIP PAIN: ICD-10-CM

## 2021-08-09 RX ORDER — MELOXICAM 7.5 MG/1
7.5 TABLET ORAL DAILY
Qty: 30 TABLET | Refills: 1 | Status: SHIPPED | OUTPATIENT
Start: 2021-08-09 | End: 2021-08-09 | Stop reason: SDUPTHER

## 2021-08-09 RX ORDER — MELOXICAM 7.5 MG/1
7.5 TABLET ORAL DAILY
Qty: 30 TABLET | Refills: 1 | Status: SHIPPED | OUTPATIENT
Start: 2021-08-09 | End: 2021-10-30

## 2021-08-13 ENCOUNTER — HOSPITAL ENCOUNTER (OUTPATIENT)
Dept: MAMMOGRAPHY | Facility: HOSPITAL | Age: 71
Discharge: HOME | End: 2021-08-13
Admitting: NURSE PRACTITIONER

## 2021-08-13 DIAGNOSIS — Z92.89 HISTORY OF MAMMOGRAPHY, SCREENING: ICD-10-CM

## 2021-08-13 PROCEDURE — 77067 SCR MAMMO BI INCL CAD: CPT

## 2021-08-13 PROCEDURE — 77063 BREAST TOMOSYNTHESIS BI: CPT

## 2021-09-01 ENCOUNTER — TELEPHONE (OUTPATIENT)
Dept: FAMILY MEDICINE CLINIC | Facility: CLINIC | Age: 71
End: 2021-09-01

## 2021-10-28 ENCOUNTER — TELEPHONE (OUTPATIENT)
Dept: FAMILY MEDICINE CLINIC | Facility: CLINIC | Age: 71
End: 2021-10-28

## 2021-10-30 PROCEDURE — 86140 C-REACTIVE PROTEIN: CPT | Performed by: FAMILY MEDICINE

## 2021-10-30 PROCEDURE — 80048 BASIC METABOLIC PNL TOTAL CA: CPT | Performed by: FAMILY MEDICINE

## 2021-10-30 PROCEDURE — 85025 COMPLETE CBC W/AUTO DIFF WBC: CPT | Performed by: FAMILY MEDICINE

## 2021-10-30 PROCEDURE — 84550 ASSAY OF BLOOD/URIC ACID: CPT | Performed by: FAMILY MEDICINE

## 2021-11-01 ENCOUNTER — TELEPHONE (OUTPATIENT)
Dept: URGENT CARE | Facility: CLINIC | Age: 71
End: 2021-11-01

## 2021-11-01 NOTE — TELEPHONE ENCOUNTER
The patient is contacted.  She is informed of the negative uric acid result, and positive CRP.  She is also informed of her comprehensive metabolic panel which demonstrated hyperglycemia with known diabetes.  Blood sugar was 311.  Plan: She has an appointment scheduled with her doctor for next week.  She is advised to follow-up with her doctor.  Go to the emergency room for any worsening symptoms.  She is also to work on tighter blood sugar control

## 2021-11-08 ENCOUNTER — LAB (OUTPATIENT)
Dept: LAB | Facility: HOSPITAL | Age: 71
End: 2021-11-08

## 2021-11-08 DIAGNOSIS — E11.65 TYPE 2 DIABETES MELLITUS WITH HYPERGLYCEMIA, WITHOUT LONG-TERM CURRENT USE OF INSULIN (HCC): ICD-10-CM

## 2021-11-08 LAB
CHOLEST SERPL-MCNC: 143 MG/DL (ref 0–200)
HBA1C MFR BLD: 8.48 % (ref 4.8–5.6)
HDLC SERPL-MCNC: 59 MG/DL (ref 40–60)
LDLC SERPL CALC-MCNC: 60 MG/DL (ref 0–100)
LDLC/HDLC SERPL: 0.95 {RATIO}
TRIGL SERPL-MCNC: 140 MG/DL (ref 0–150)
VLDLC SERPL-MCNC: 24 MG/DL (ref 5–40)

## 2021-11-08 PROCEDURE — 80053 COMPREHEN METABOLIC PANEL: CPT

## 2021-11-08 PROCEDURE — 36415 COLL VENOUS BLD VENIPUNCTURE: CPT

## 2021-11-08 PROCEDURE — 80061 LIPID PANEL: CPT

## 2021-11-08 PROCEDURE — 83036 HEMOGLOBIN GLYCOSYLATED A1C: CPT

## 2021-11-09 PROBLEM — M48.02 CERVICAL SPINAL STENOSIS: Status: ACTIVE | Noted: 2019-04-03

## 2021-11-09 PROBLEM — M43.22 FUSION OF SPINE OF CERVICAL REGION: Status: ACTIVE | Noted: 2019-06-19

## 2021-11-09 PROBLEM — M47.816 LUMBAR SPONDYLOSIS: Status: ACTIVE | Noted: 2020-03-19

## 2021-11-09 PROBLEM — M51.36 DEGENERATION OF LUMBAR INTERVERTEBRAL DISC: Status: ACTIVE | Noted: 2021-11-09

## 2021-11-09 PROBLEM — Z79.4 ENCOUNTER FOR LONG-TERM (CURRENT) USE OF INSULIN: Status: ACTIVE | Noted: 2020-05-22

## 2021-11-09 PROBLEM — M51.369 DEGENERATION OF LUMBAR INTERVERTEBRAL DISC: Status: ACTIVE | Noted: 2021-11-09

## 2021-11-09 PROBLEM — M54.2 NECK PAIN: Status: ACTIVE | Noted: 2021-11-09

## 2021-11-09 LAB
ALBUMIN SERPL-MCNC: 3.9 G/DL (ref 3.5–5.2)
ALBUMIN/GLOB SERPL: 1.1 G/DL
ALP SERPL-CCNC: 201 U/L (ref 39–117)
ALT SERPL W P-5'-P-CCNC: 34 U/L (ref 1–33)
ANION GAP SERPL CALCULATED.3IONS-SCNC: 5 MMOL/L (ref 5–15)
AST SERPL-CCNC: 34 U/L (ref 1–32)
BILIRUB SERPL-MCNC: 0.5 MG/DL (ref 0–1.2)
BUN SERPL-MCNC: 14 MG/DL (ref 8–23)
BUN/CREAT SERPL: 25.5 (ref 7–25)
CALCIUM SPEC-SCNC: 9.3 MG/DL (ref 8.6–10.5)
CHLORIDE SERPL-SCNC: 100 MMOL/L (ref 98–107)
CO2 SERPL-SCNC: 30 MMOL/L (ref 22–29)
CREAT SERPL-MCNC: 0.55 MG/DL (ref 0.57–1)
GFR SERPL CREATININE-BSD FRML MDRD: 109 ML/MIN/1.73
GLOBULIN UR ELPH-MCNC: 3.5 GM/DL
GLUCOSE SERPL-MCNC: 311 MG/DL (ref 65–99)
POTASSIUM SERPL-SCNC: 4.4 MMOL/L (ref 3.5–5.2)
PROT SERPL-MCNC: 7.4 G/DL (ref 6–8.5)
SODIUM SERPL-SCNC: 135 MMOL/L (ref 136–145)

## 2021-11-11 ENCOUNTER — TELEPHONE (OUTPATIENT)
Dept: FAMILY MEDICINE CLINIC | Facility: CLINIC | Age: 71
End: 2021-11-11

## 2021-11-11 DIAGNOSIS — R79.89 ELEVATED LIVER FUNCTION TESTS: Primary | ICD-10-CM

## 2021-11-11 NOTE — TELEPHONE ENCOUNTER
Patient was a no show for her appt on 11/10/2021. Left patient a VM to call back to go over lab work that needs to be discussed.

## 2021-11-11 NOTE — TELEPHONE ENCOUNTER
----- Message from CHINA Saldana sent at 11/9/2021 12:28 PM EST -----  Liver enzymes mildly elevated, patient is followed by GI.  A1c has improved, but still needs further improvement.  Recommend increase dose of Tresiba to 52 units daily and continue to monitor.  We will discuss further at office visit tomorrow

## 2021-12-22 RX ORDER — DULOXETIN HYDROCHLORIDE 60 MG/1
60 CAPSULE, DELAYED RELEASE ORAL DAILY
Qty: 90 CAPSULE | Refills: 1 | Status: CANCELLED | OUTPATIENT
Start: 2021-12-22

## 2021-12-23 ENCOUNTER — OFFICE VISIT (OUTPATIENT)
Dept: FAMILY MEDICINE CLINIC | Facility: CLINIC | Age: 71
End: 2021-12-23

## 2021-12-23 VITALS
WEIGHT: 194 LBS | BODY MASS INDEX: 36.63 KG/M2 | OXYGEN SATURATION: 98 % | HEART RATE: 96 BPM | SYSTOLIC BLOOD PRESSURE: 134 MMHG | HEIGHT: 61 IN | DIASTOLIC BLOOD PRESSURE: 78 MMHG

## 2021-12-23 DIAGNOSIS — E78.2 MIXED HYPERLIPIDEMIA: ICD-10-CM

## 2021-12-23 DIAGNOSIS — E11.65 TYPE 2 DIABETES MELLITUS WITH HYPERGLYCEMIA, WITHOUT LONG-TERM CURRENT USE OF INSULIN (HCC): ICD-10-CM

## 2021-12-23 DIAGNOSIS — K21.9 GASTROESOPHAGEAL REFLUX DISEASE, UNSPECIFIED WHETHER ESOPHAGITIS PRESENT: ICD-10-CM

## 2021-12-23 DIAGNOSIS — I10 ESSENTIAL HYPERTENSION: ICD-10-CM

## 2021-12-23 PROCEDURE — 99214 OFFICE O/P EST MOD 30 MIN: CPT | Performed by: NURSE PRACTITIONER

## 2021-12-23 RX ORDER — AMLODIPINE BESYLATE 5 MG/1
5 TABLET ORAL DAILY
Qty: 90 TABLET | Refills: 1 | Status: SHIPPED | OUTPATIENT
Start: 2021-12-23 | End: 2022-06-23 | Stop reason: SDUPTHER

## 2021-12-23 RX ORDER — OMEPRAZOLE 40 MG/1
40 CAPSULE, DELAYED RELEASE ORAL DAILY
Qty: 90 CAPSULE | Refills: 1 | Status: SHIPPED | OUTPATIENT
Start: 2021-12-23 | End: 2022-06-23 | Stop reason: SDUPTHER

## 2021-12-23 RX ORDER — PEN NEEDLE, DIABETIC 32 GX3/16"
1 NEEDLE, DISPOSABLE MISCELLANEOUS 2 TIMES DAILY
Qty: 180 EACH | Refills: 1 | Status: SHIPPED | OUTPATIENT
Start: 2021-12-23 | End: 2022-06-23 | Stop reason: SDUPTHER

## 2021-12-23 RX ORDER — ATORVASTATIN CALCIUM 10 MG/1
10 TABLET, FILM COATED ORAL DAILY
Qty: 90 TABLET | Refills: 1 | Status: SHIPPED | OUTPATIENT
Start: 2021-12-23 | End: 2022-06-23 | Stop reason: SDUPTHER

## 2021-12-23 RX ORDER — BENAZEPRIL HYDROCHLORIDE 40 MG/1
40 TABLET, FILM COATED ORAL DAILY
Qty: 90 TABLET | Refills: 1 | Status: SHIPPED | OUTPATIENT
Start: 2021-12-23 | End: 2022-06-23 | Stop reason: SDUPTHER

## 2021-12-23 RX ORDER — GLIPIZIDE 10 MG/1
10 TABLET ORAL
Qty: 180 TABLET | Refills: 1 | Status: SHIPPED | OUTPATIENT
Start: 2021-12-23 | End: 2022-06-23 | Stop reason: SDUPTHER

## 2021-12-23 NOTE — ASSESSMENT & PLAN NOTE
Diabetes is not at goal, but significantly improved as compared to previous A1c.,  Currently 8.4%.  Continue glipizide and Tresiba

## 2021-12-23 NOTE — PROGRESS NOTES
Chief Complaint  Follow-up hypertension, diabetes, hyperlipidemia  Subjective          Melania Centeno presents to [facility] for   History of Present Illness    Patient presents today for 6-month follow-up on hyperlipidemia (atorvastatin), Hypertension (benazapril, amlodipine), Diabetes (glipizide, tresiba), and Heartburn (omeprazole).  Patient states she is doing well overall, just had labs last month, A1c has significantly improved.  Medical History  Past Medical History:   Diagnosis Date   • Cervical spinal stenosis 04/03/2019    Brisk reflexes suggestive of myelopathy   • Cervicalgia    • Diverticulitis of colon    • Foot pain    • Fusion of spine of cervical region 06/19/2019   • GERD (gastroesophageal reflux disease)    • Hyperlipidemia    • Hypertension    • Type 2 diabetes mellitus (HCC)      Surgical History  Past Surgical History:   Procedure Laterality Date   • BREAST BIOPSY     • CERVICAL FUSION  03/14/2019    right C4-5,C5-6   • CHOLECYSTECTOMY     • KNEE SURGERY     • TUBAL ABDOMINAL LIGATION       Social History  Social History     Socioeconomic History   • Marital status:    Tobacco Use   • Smoking status: Never Smoker   • Smokeless tobacco: Never Used   Vaping Use   • Vaping Use: Never used   Substance and Sexual Activity   • Alcohol use: Never   • Drug use: No   • Sexual activity: Defer       Current Outpatient Medications:   •  Droplet Pen Needles 32G X 5 MM misc, Inject 1 each under the skin into the appropriate area as directed 2 (Two) Times a Day., Disp: 180 each, Rfl: 1  •  DULoxetine (CYMBALTA) 60 MG capsule, , Disp: , Rfl:   •  HYDROcodone-acetaminophen (NORCO) 7.5-325 MG per tablet, Take 1 tablet by mouth Every 6 (Six) Hours As Needed., Disp: , Rfl:   •  tiZANidine (ZANAFLEX) 4 MG tablet, Take 4 mg by mouth At Night As Needed., Disp: , Rfl:   •  amLODIPine (NORVASC) 5 MG tablet, Take 1 tablet by mouth Daily., Disp: 90 tablet, Rfl: 1  •  atorvastatin  "(LIPITOR) 10 MG tablet, Take 1 tablet by mouth Daily., Disp: 90 tablet, Rfl: 1  •  benazepril (LOTENSIN) 40 MG tablet, Take 1 tablet by mouth Daily., Disp: 90 tablet, Rfl: 1  •  glipizide (GLUCOTROL) 10 MG tablet, Take 1 tablet by mouth 2 (Two) Times a Day Before Meals., Disp: 180 tablet, Rfl: 1  •  insulin degludec (TRESIBA FLEXTOUCH) 100 UNIT/ML solution pen-injector injection, Inject 50 Units under the skin into the appropriate area as directed Daily., Disp: 15 pen, Rfl: 1  •  omeprazole (priLOSEC) 40 MG capsule, Take 1 capsule by mouth Daily., Disp: 90 capsule, Rfl: 1    Review of Systems     Objective     /78   Pulse 96   Ht 154.9 cm (61\")   Wt 88 kg (194 lb)   SpO2 98%   BMI 36.66 kg/m²     Body mass index is 36.66 kg/m².    Physical Exam  Vitals reviewed.   Constitutional:       Appearance: Normal appearance. She is well-developed.   HENT:      Head: Normocephalic and atraumatic.      Right Ear: External ear normal.      Left Ear: External ear normal.   Eyes:      Conjunctiva/sclera: Conjunctivae normal.      Pupils: Pupils are equal, round, and reactive to light.   Cardiovascular:      Rate and Rhythm: Normal rate and regular rhythm.      Heart sounds: No murmur heard.  No friction rub. No gallop.    Pulmonary:      Effort: Pulmonary effort is normal.      Breath sounds: Normal breath sounds. No wheezing or rhonchi.   Skin:     General: Skin is warm and dry.   Neurological:      Mental Status: She is alert and oriented to person, place, and time.      Cranial Nerves: No cranial nerve deficit.   Psychiatric:         Mood and Affect: Mood and affect normal.         Behavior: Behavior normal.         Thought Content: Thought content normal.         Judgment: Judgment normal.         Result Review :     The following data was reviewed by: CHINA Pinedo on 12/23/2021:    CMP    CMP 5/17/21 10/30/21 11/8/21   Glucose 310 (A) 311 (A) 311 (A)   BUN 14 14 14   Creatinine 0.82 0.75 0.55 (A)   eGFR " Non African Am  76 109   Sodium 136 138 135 (A)   Potassium 4.2 4.9 4.4   Chloride 97 (A) 101 100   Calcium 9.3 9.4 9.3   Albumin 4.1  3.90   Total Bilirubin 0.77  0.5   Alkaline Phosphatase 153  201 (A)   AST (SGOT) 43  34 (A)   ALT (SGPT) 35  34 (A)   (A) Abnormal value            CBC    CBC 3/16/21 10/30/21   WBC 10.09 7.99   RBC 5.19 4.74   Hemoglobin 15.5 14.3   Hematocrit 45.8 42.9   MCV 88.2 90.5   MCH 29.9 30.2   MCHC 33.8 33.3   RDW 13.1 13.9   Platelets 164 145           Lipid Panel    Lipid Panel 2/4/21 11/8/21   Total Cholesterol  143   Total Cholesterol 148    Triglycerides 97 140   HDL Cholesterol 71 (A) 59   VLDL Cholesterol 19 24   LDL Cholesterol  58 (A) 60   LDL/HDL Ratio  0.95   (A) Abnormal value       Comments are available for some flowsheets but are not being displayed.               Most Recent A1C    HGBA1C Most Recent 11/8/21   Hemoglobin A1C 8.48 (A)   (A) Abnormal value                               Assessment:  Diagnoses and all orders for this visit:    1. Essential hypertension  Assessment & Plan:  Hypertension is stable and fairly well controlled at present, continue amlodipine and benazepril    Orders:  -     amLODIPine (NORVASC) 5 MG tablet; Take 1 tablet by mouth Daily.  Dispense: 90 tablet; Refill: 1  -     benazepril (LOTENSIN) 40 MG tablet; Take 1 tablet by mouth Daily.  Dispense: 90 tablet; Refill: 1    2. Mixed hyperlipidemia  Assessment & Plan:  Lipids well controlled with atorvastatin, continue current dose    Orders:  -     atorvastatin (LIPITOR) 10 MG tablet; Take 1 tablet by mouth Daily.  Dispense: 90 tablet; Refill: 1    3. Type 2 diabetes mellitus with hyperglycemia, without long-term current use of insulin (HCC)  Assessment & Plan:  Diabetes is not at goal, but significantly improved as compared to previous A1c.,  Currently 8.4%.  Continue glipizide and Tresiba    Orders:  -     glipizide (GLUCOTROL) 10 MG tablet; Take 1 tablet by mouth 2 (Two) Times a Day Before  Meals.  Dispense: 180 tablet; Refill: 1  -     insulin degludec (TRESIBA FLEXTOUCH) 100 UNIT/ML solution pen-injector injection; Inject 50 Units under the skin into the appropriate area as directed Daily.  Dispense: 15 pen; Refill: 1  -     Droplet Pen Needles 32G X 5 MM misc; Inject 1 each under the skin into the appropriate area as directed 2 (Two) Times a Day.  Dispense: 180 each; Refill: 1    4. Gastroesophageal reflux disease, unspecified whether esophagitis present  -     omeprazole (priLOSEC) 40 MG capsule; Take 1 capsule by mouth Daily.  Dispense: 90 capsule; Refill: 1              Follow Up     Return in about 3 months (around 3/23/2022).    Patient was given instructions and counseling regarding her condition or for health maintenance advice. Please see specific information pulled into the AVS if appropriate.     Kelsey Hoyos, APRN  12/23/2021

## 2022-04-18 NOTE — ASSESSMENT & PLAN NOTE
Lipids well controlled with atorvastatin, continue current dose   Topical Sulfur Applications Counseling: Topical Sulfur Counseling: Patient counseled that this medication may cause skin irritation or allergic reactions.  In the event of skin irritation, the patient was advised to reduce the amount of the drug applied or use it less frequently.   The patient verbalized understanding of the proper use and possible adverse effects of topical sulfur application.  All of the patient's questions and concerns were addressed. Birth Control Pills Pregnancy And Lactation Text: This medication should be avoided if pregnant and for the first 30 days post-partum. Detail Level: Simple Tetracycline Pregnancy And Lactation Text: This medication is Pregnancy Category D and not consider safe during pregnancy. It is also excreted in breast milk. Winlevi Counseling:  I discussed with the patient the risks of topical clascoterone including but not limited to erythema, scaling, itching, and stinging. Patient voiced their understanding. Doxycycline Pregnancy And Lactation Text: This medication is Pregnancy Category D and not consider safe during pregnancy. It is also excreted in breast milk but is considered safe for shorter treatment courses. High Dose Vitamin A Counseling: Side effects reviewed, pt to contact office should one occur. Topical Retinoid Pregnancy And Lactation Text: This medication is Pregnancy Category C. It is unknown if this medication is excreted in breast milk. Azithromycin Counseling:  I discussed with the patient the risks of azithromycin including but not limited to GI upset, allergic reaction, drug rash, diarrhea, and yeast infections. Sarecycline Counseling: Patient advised regarding possible photosensitivity and discoloration of the teeth, skin, lips, tongue and gums.  Patient instructed to avoid sunlight, if possible.  When exposed to sunlight, patients should wear protective clothing, sunglasses, and sunscreen.  The patient was instructed to call the office immediately if the following severe adverse effects occur:  hearing changes, easy bruising/bleeding, severe headache, or vision changes.  The patient verbalized understanding of the proper use and possible adverse effects of sarecycline.  All of the patient's questions and concerns were addressed. Birth Control Pills Counseling: Birth Control Pill Counseling: I discussed with the patient the potential side effects of OCPs including but not limited to increased risk of stroke, heart attack, thrombophlebitis, deep venous thrombosis, hepatic adenomas, breast changes, GI upset, headaches, and depression.  The patient verbalized understanding of the proper use and possible adverse effects of OCPs. All of the patient's questions and concerns were addressed. Benzoyl Peroxide Counseling: Patient counseled that medicine may cause skin irritation and bleach clothing.  In the event of skin irritation, the patient was advised to reduce the amount of the drug applied or use it less frequently.   The patient verbalized understanding of the proper use and possible adverse effects of benzoyl peroxide.  All of the patient's questions and concerns were addressed. High Dose Vitamin A Pregnancy And Lactation Text: High dose vitamin A therapy is contraindicated during pregnancy and breast feeding. Tazorac Pregnancy And Lactation Text: This medication is not safe during pregnancy. It is unknown if this medication is excreted in breast milk. Azithromycin Pregnancy And Lactation Text: This medication is considered safe during pregnancy and is also secreted in breast milk. Topical Sulfur Applications Pregnancy And Lactation Text: This medication is Pregnancy Category C and has an unknown safety profile during pregnancy. It is unknown if this topical medication is excreted in breast milk. Dapsone Counseling: I discussed with the patient the risks of dapsone including but not limited to hemolytic anemia, agranulocytosis, rashes, methemoglobinemia, kidney failure, peripheral neuropathy, headaches, GI upset, and liver toxicity.  Patients who start dapsone require monitoring including baseline LFTs and weekly CBCs for the first month, then every month thereafter.  The patient verbalized understanding of the proper use and possible adverse effects of dapsone.  All of the patient's questions and concerns were addressed. Winlevi Pregnancy And Lactation Text: This medication is considered safe during pregnancy and breastfeeding. Erythromycin Counseling:  I discussed with the patient the risks of erythromycin including but not limited to GI upset, allergic reaction, drug rash, diarrhea, increase in liver enzymes, and yeast infections. Tazorac Counseling:  Patient advised that medication is irritating and drying.  Patient may need to apply sparingly and wash off after an hour before eventually leaving it on overnight.  The patient verbalized understanding of the proper use and possible adverse effects of tazorac.  All of the patient's questions and concerns were addressed. Minocycline Counseling: Patient advised regarding possible photosensitivity and discoloration of the teeth, skin, lips, tongue and gums.  Patient instructed to avoid sunlight, if possible.  When exposed to sunlight, patients should wear protective clothing, sunglasses, and sunscreen.  The patient was instructed to call the office immediately if the following severe adverse effects occur:  hearing changes, easy bruising/bleeding, severe headache, or vision changes.  The patient verbalized understanding of the proper use and possible adverse effects of minocycline.  All of the patient's questions and concerns were addressed. Spironolactone Pregnancy And Lactation Text: This medication can cause feminization of the male fetus and should be avoided during pregnancy. The active metabolite is also found in breast milk. Dapsone Pregnancy And Lactation Text: This medication is Pregnancy Category C and is not considered safe during pregnancy or breast feeding. Benzoyl Peroxide Pregnancy And Lactation Text: This medication is Pregnancy Category C. It is unknown if benzoyl peroxide is excreted in breast milk. Erythromycin Pregnancy And Lactation Text: This medication is Pregnancy Category B and is considered safe during pregnancy. It is also excreted in breast milk. Bactrim Counseling:  I discussed with the patient the risks of sulfa antibiotics including but not limited to GI upset, allergic reaction, drug rash, diarrhea, dizziness, photosensitivity, and yeast infections.  Rarely, more serious reactions can occur including but not limited to aplastic anemia, agranulocytosis, methemoglobinemia, blood dyscrasias, liver or kidney failure, lung infiltrates or desquamative/blistering drug rashes. Azelaic Acid Counseling: Patient counseled that medicine may cause skin irritation and to avoid applying near the eyes.  In the event of skin irritation, the patient was advised to reduce the amount of the drug applied or use it less frequently.   The patient verbalized understanding of the proper use and possible adverse effects of azelaic acid.  All of the patient's questions and concerns were addressed. Spironolactone Counseling: Patient advised regarding risks of diarrhea, abdominal pain, hyperkalemia, birth defects (for female patients), liver toxicity and renal toxicity. The patient may need blood work to monitor liver and kidney function and potassium levels while on therapy. The patient verbalized understanding of the proper use and possible adverse effects of spironolactone.  All of the patient's questions and concerns were addressed. Doxycycline Counseling:  Patient counseled regarding possible photosensitivity and increased risk for sunburn.  Patient instructed to avoid sunlight, if possible.  When exposed to sunlight, patients should wear protective clothing, sunglasses, and sunscreen.  The patient was instructed to call the office immediately if the following severe adverse effects occur:  hearing changes, easy bruising/bleeding, severe headache, or vision changes.  The patient verbalized understanding of the proper use and possible adverse effects of doxycycline.  All of the patient's questions and concerns were addressed. Tetracycline Counseling: Patient counseled regarding possible photosensitivity and increased risk for sunburn.  Patient instructed to avoid sunlight, if possible.  When exposed to sunlight, patients should wear protective clothing, sunglasses, and sunscreen.  The patient was instructed to call the office immediately if the following severe adverse effects occur:  hearing changes, easy bruising/bleeding, severe headache, or vision changes.  The patient verbalized understanding of the proper use and possible adverse effects of tetracycline.  All of the patient's questions and concerns were addressed. Patient understands to avoid pregnancy while on therapy due to potential birth defects. Include Pregnancy/Lactation Warning?: No Isotretinoin Pregnancy And Lactation Text: This medication is Pregnancy Category X and is considered extremely dangerous during pregnancy. It is unknown if it is excreted in breast milk. Topical Clindamycin Pregnancy And Lactation Text: This medication is Pregnancy Category B and is considered safe during pregnancy. It is unknown if it is excreted in breast milk. Azelaic Acid Pregnancy And Lactation Text: This medication is considered safe during pregnancy and breast feeding. Topical Retinoid counseling:  Patient advised to apply a pea-sized amount only at bedtime and wait 30 minutes after washing their face before applying.  If too drying, patient may add a non-comedogenic moisturizer. The patient verbalized understanding of the proper use and possible adverse effects of retinoids.  All of the patient's questions and concerns were addressed. Isotretinoin Counseling: Patient should get monthly blood tests, not donate blood, not drive at night if vision affected, not share medication, and not undergo elective surgery for 6 months after tx completed. Side effects reviewed, pt to contact office should one occur. Topical Clindamycin Counseling: Patient counseled that this medication may cause skin irritation or allergic reactions.  In the event of skin irritation, the patient was advised to reduce the amount of the drug applied or use it less frequently.   The patient verbalized understanding of the proper use and possible adverse effects of clindamycin.  All of the patient's questions and concerns were addressed. Bactrim Pregnancy And Lactation Text: This medication is Pregnancy Category D and is known to cause fetal risk.  It is also excreted in breast milk.

## 2022-06-22 DIAGNOSIS — I10 ESSENTIAL HYPERTENSION: ICD-10-CM

## 2022-06-22 DIAGNOSIS — E78.2 MIXED HYPERLIPIDEMIA: ICD-10-CM

## 2022-06-28 ENCOUNTER — OFFICE VISIT (OUTPATIENT)
Dept: FAMILY MEDICINE CLINIC | Facility: CLINIC | Age: 72
End: 2022-06-28

## 2022-06-28 VITALS
SYSTOLIC BLOOD PRESSURE: 153 MMHG | HEART RATE: 88 BPM | OXYGEN SATURATION: 96 % | BODY MASS INDEX: 37.76 KG/M2 | WEIGHT: 200 LBS | DIASTOLIC BLOOD PRESSURE: 72 MMHG | HEIGHT: 61 IN

## 2022-06-28 DIAGNOSIS — E78.2 MIXED HYPERLIPIDEMIA: ICD-10-CM

## 2022-06-28 DIAGNOSIS — E11.65 TYPE 2 DIABETES MELLITUS WITH HYPERGLYCEMIA, WITHOUT LONG-TERM CURRENT USE OF INSULIN: ICD-10-CM

## 2022-06-28 DIAGNOSIS — M79.601 PAIN IN BOTH UPPER EXTREMITIES: ICD-10-CM

## 2022-06-28 DIAGNOSIS — Z53.20 COLON CANCER SCREENING DECLINED: ICD-10-CM

## 2022-06-28 DIAGNOSIS — M79.602 PAIN IN BOTH UPPER EXTREMITIES: ICD-10-CM

## 2022-06-28 DIAGNOSIS — Z13.29 SCREENING FOR THYROID DISORDER: Primary | ICD-10-CM

## 2022-06-28 DIAGNOSIS — I10 ESSENTIAL HYPERTENSION: ICD-10-CM

## 2022-06-28 DIAGNOSIS — Z12.31 BREAST CANCER SCREENING BY MAMMOGRAM: ICD-10-CM

## 2022-06-28 DIAGNOSIS — K21.9 GASTROESOPHAGEAL REFLUX DISEASE, UNSPECIFIED WHETHER ESOPHAGITIS PRESENT: ICD-10-CM

## 2022-06-28 PROCEDURE — 99214 OFFICE O/P EST MOD 30 MIN: CPT | Performed by: NURSE PRACTITIONER

## 2022-06-28 RX ORDER — ATORVASTATIN CALCIUM 10 MG/1
10 TABLET, FILM COATED ORAL DAILY
Qty: 90 TABLET | Refills: 1 | Status: SHIPPED | OUTPATIENT
Start: 2022-06-28 | End: 2023-01-05 | Stop reason: SDUPTHER

## 2022-06-28 RX ORDER — AMLODIPINE BESYLATE 5 MG/1
5 TABLET ORAL DAILY
Qty: 90 TABLET | Refills: 1 | Status: SHIPPED | OUTPATIENT
Start: 2022-06-28 | End: 2023-01-05 | Stop reason: SDUPTHER

## 2022-06-28 RX ORDER — BENAZEPRIL HYDROCHLORIDE 40 MG/1
40 TABLET, FILM COATED ORAL DAILY
Qty: 90 TABLET | Refills: 1 | Status: SHIPPED | OUTPATIENT
Start: 2022-06-28 | End: 2023-01-05 | Stop reason: SDUPTHER

## 2022-06-28 RX ORDER — GLIPIZIDE 10 MG/1
10 TABLET ORAL
Qty: 180 TABLET | Refills: 1 | Status: SHIPPED | OUTPATIENT
Start: 2022-06-28 | End: 2023-01-03

## 2022-06-28 RX ORDER — ATORVASTATIN CALCIUM 10 MG/1
TABLET, FILM COATED ORAL
Qty: 90 TABLET | Refills: 1 | OUTPATIENT
Start: 2022-06-28

## 2022-06-28 RX ORDER — OMEPRAZOLE 40 MG/1
40 CAPSULE, DELAYED RELEASE ORAL DAILY
Qty: 90 CAPSULE | Refills: 1 | Status: SHIPPED | OUTPATIENT
Start: 2022-06-28 | End: 2023-01-03

## 2022-06-28 RX ORDER — PEN NEEDLE, DIABETIC 32 GX3/16"
1 NEEDLE, DISPOSABLE MISCELLANEOUS 2 TIMES DAILY
Qty: 180 EACH | Refills: 1 | Status: SHIPPED | OUTPATIENT
Start: 2022-06-28 | End: 2022-12-30

## 2022-06-28 RX ORDER — BENAZEPRIL HYDROCHLORIDE 40 MG/1
TABLET, FILM COATED ORAL
Qty: 90 TABLET | Refills: 1 | OUTPATIENT
Start: 2022-06-28

## 2022-06-28 NOTE — PROGRESS NOTES
Chief Complaint  Hypertension, hyperlipidemia, diabetes  Subjective          Melania Centeno presents to [facility] for   History of Present Illness  Patient presents today to follow-up on chronic conditions including hypertension (Amlodipine, Benazepril), Hyperlipidemia (Atorvastatin), Diabetes (Glipizide, Tresiba), Heartburn (Omeprazole), and is also complaining of arm Pain (Aches in both arms, patient says this gets better when she takes a baby aspirin).  Patient states that it seems to occur at random, she has achy upper arms, both arms are affected equally, states it just feels like sore muscles but she is really not been doing any extra work that would explain it.  Pt states she has been taking 40 units of Tresiba for a couple months due to almost being out, was supposed to be taking 52 untis.     Medical History  Past Medical History:   Diagnosis Date   • Cervical spinal stenosis 04/03/2019    Brisk reflexes suggestive of myelopathy   • Cervicalgia    • Diverticulitis of colon    • Foot pain    • Fusion of spine of cervical region 06/19/2019   • GERD (gastroesophageal reflux disease)    • Hyperlipidemia    • Hypertension    • Type 2 diabetes mellitus (HCC)      Surgical History  Past Surgical History:   Procedure Laterality Date   • BREAST BIOPSY     • CERVICAL FUSION  03/14/2019    right C4-5,C5-6   • CHOLECYSTECTOMY     • KNEE SURGERY     • TUBAL ABDOMINAL LIGATION       Social History  Social History     Socioeconomic History   • Marital status:    Tobacco Use   • Smoking status: Never Smoker   • Smokeless tobacco: Never Used   Vaping Use   • Vaping Use: Never used   Substance and Sexual Activity   • Alcohol use: Never   • Drug use: No   • Sexual activity: Defer       Current Outpatient Medications:   •  amLODIPine (NORVASC) 5 MG tablet, Take 1 tablet by mouth Daily., Disp: 90 tablet, Rfl: 1  •  atorvastatin (LIPITOR) 10 MG tablet, Take 1 tablet by mouth Daily.,  "Disp: 90 tablet, Rfl: 1  •  benazepril (LOTENSIN) 40 MG tablet, Take 1 tablet by mouth Daily., Disp: 90 tablet, Rfl: 1  •  Droplet Pen Needles 32G X 5 MM misc, Inject 1 each under the skin into the appropriate area as directed 2 (Two) Times a Day., Disp: 180 each, Rfl: 1  •  glipizide (GLUCOTROL) 10 MG tablet, Take 1 tablet by mouth 2 (Two) Times a Day Before Meals., Disp: 180 tablet, Rfl: 1  •  insulin degludec (TRESIBA FLEXTOUCH) 100 UNIT/ML solution pen-injector injection, Inject 50 Units under the skin into the appropriate area as directed Daily., Disp: 15 pen, Rfl: 1  •  omeprazole (priLOSEC) 40 MG capsule, Take 1 capsule by mouth Daily., Disp: 90 capsule, Rfl: 1    Review of Systems     Objective     /72   Pulse 88   Ht 154.9 cm (61\")   Wt 90.7 kg (200 lb)   SpO2 96%   BMI 37.79 kg/m²     Body mass index is 37.79 kg/m².    Physical Exam  Vitals reviewed.   Constitutional:       Appearance: Normal appearance. She is well-developed.   HENT:      Head: Normocephalic and atraumatic.      Right Ear: External ear normal.      Left Ear: External ear normal.      Mouth/Throat:      Pharynx: No oropharyngeal exudate.   Eyes:      Conjunctiva/sclera: Conjunctivae normal.      Pupils: Pupils are equal, round, and reactive to light.   Cardiovascular:      Rate and Rhythm: Normal rate and regular rhythm.      Heart sounds: No murmur heard.    No friction rub. No gallop.   Pulmonary:      Effort: Pulmonary effort is normal.      Breath sounds: Normal breath sounds. No wheezing or rhonchi.   Abdominal:      General: Bowel sounds are normal. There is no distension.      Palpations: Abdomen is soft.      Tenderness: There is no abdominal tenderness.   Musculoskeletal:      Right upper arm: Normal. No swelling, tenderness or bony tenderness.      Left upper arm: Normal. No swelling, tenderness or bony tenderness.   Skin:     General: Skin is warm and dry.   Neurological:      Mental Status: She is alert and oriented " to person, place, and time.      Cranial Nerves: No cranial nerve deficit.   Psychiatric:         Mood and Affect: Mood and affect normal.         Behavior: Behavior normal.         Thought Content: Thought content normal.         Judgment: Judgment normal.         Result Review :     The following data was reviewed by: CHINA Pinedo on 06/28/2022:    CBC    CBC 10/30/21   WBC 7.99   RBC 4.74   Hemoglobin 14.3   Hematocrit 42.9   MCV 90.5   MCH 30.2   MCHC 33.3   RDW 13.9   Platelets 145           CBC w/diff    CBC w/Diff 10/30/21   WBC 7.99   RBC 4.74   Hemoglobin 14.3   Hematocrit 42.9   MCV 90.5   MCH 30.2   MCHC 33.3   RDW 13.9   Platelets 145   Neutrophil Rel % 58.4   Immature Granulocyte Rel % 0.4   Lymphocyte Rel % 30.7   Monocyte Rel % 8.4   Eosinophil Rel % 1.3   Basophil Rel % 0.8           Lipid Panel    Lipid Panel 11/8/21   Total Cholesterol 143   Triglycerides 140   HDL Cholesterol 59   VLDL Cholesterol 24   LDL Cholesterol  60   LDL/HDL Ratio 0.95               Most Recent A1C    HGBA1C Most Recent 11/8/21   Hemoglobin A1C 8.48 (A)   (A) Abnormal value                               Assessment:  Diagnoses and all orders for this visit:    1. Screening for thyroid disorder (Primary)  -     TSH; Future    2. Essential hypertension  Assessment & Plan:  Hypertension is fairly well controlled, 132/74 on manual recheck.  Continue current dose of amlodipine, benazepril.    Orders:  -     amLODIPine (NORVASC) 5 MG tablet; Take 1 tablet by mouth Daily.  Dispense: 90 tablet; Refill: 1  -     benazepril (LOTENSIN) 40 MG tablet; Take 1 tablet by mouth Daily.  Dispense: 90 tablet; Refill: 1  -     CBC w AUTO Differential; Future  -     Comprehensive metabolic panel; Future    3. Mixed hyperlipidemia  Assessment & Plan:  Stable on atorvastatin, continue current dose    Orders:  -     atorvastatin (LIPITOR) 10 MG tablet; Take 1 tablet by mouth Daily.  Dispense: 90 tablet; Refill: 1  -     Lipid panel;  Future    4. Type 2 diabetes mellitus with hyperglycemia, without long-term current use of insulin ([facility])  Assessment & Plan:    Patient is due for labs to recheck, last labs had improved from 11% A1c to 8.4.  Patient states that she continues to be compliant with her medication, however she did have to decrease her dose of Tresiba for several weeks due to almost being out of it.  We will recheck the A1c and decide on any changes needed after results    Orders:  -     Droplet Pen Needles 32G X 5 MM misc; Inject 1 each under the skin into the appropriate area as directed 2 (Two) Times a Day.  Dispense: 180 each; Refill: 1  -     glipizide (GLUCOTROL) 10 MG tablet; Take 1 tablet by mouth 2 (Two) Times a Day Before Meals.  Dispense: 180 tablet; Refill: 1  -     insulin degludec (TRESIBA FLEXTOUCH) 100 UNIT/ML solution pen-injector injection; Inject 50 Units under the skin into the appropriate area as directed Daily.  Dispense: 15 pen; Refill: 1  -     CBC w AUTO Differential; Future  -     Comprehensive metabolic panel; Future  -     Hemoglobin A1c; Future  -     Microalbumin / Creatinine Urine Ratio - Urine, Clean Catch; Future    5. Gastroesophageal reflux disease, unspecified whether esophagitis present  -     omeprazole (priLOSEC) 40 MG capsule; Take 1 capsule by mouth Daily.  Dispense: 90 capsule; Refill: 1    6. Breast cancer screening by mammogram  -     Mammo Screening Digital Tomosynthesis Bilateral With CAD; Future    7. Colon cancer screening declined    8. Pain in both upper extremities  -     XR Humerus 2 View Bilateral; Future              Follow Up     Return in about 6 months (around 12/28/2022).    Patient was given instructions and counseling regarding her condition or for health maintenance advice. Please see specific information pulled into the AVS if appropriate.     Kelsey Hoyos, APRN  06/28/2022

## 2022-06-30 NOTE — ASSESSMENT & PLAN NOTE
Hypertension is fairly well controlled, 132/74 on manual recheck.  Continue current dose of amlodipine, benazepril.

## 2022-06-30 NOTE — ASSESSMENT & PLAN NOTE
Patient is due for labs to recheck, last labs had improved from 11% A1c to 8.4.  Patient states that she continues to be compliant with her medication, however she did have to decrease her dose of Tresiba for several weeks due to almost being out of it.  We will recheck the A1c and decide on any changes needed after results

## 2022-07-13 ENCOUNTER — TELEPHONE (OUTPATIENT)
Dept: FAMILY MEDICINE CLINIC | Facility: CLINIC | Age: 72
End: 2022-07-13

## 2022-07-13 NOTE — TELEPHONE ENCOUNTER
Left message for patient to return call to the office regarding lab work that she needs to get done. Stated on VM that she will need to fast for these labs to be done.

## 2022-12-30 DIAGNOSIS — K21.9 GASTROESOPHAGEAL REFLUX DISEASE, UNSPECIFIED WHETHER ESOPHAGITIS PRESENT: ICD-10-CM

## 2022-12-30 DIAGNOSIS — E11.65 TYPE 2 DIABETES MELLITUS WITH HYPERGLYCEMIA, WITHOUT LONG-TERM CURRENT USE OF INSULIN: ICD-10-CM

## 2023-01-03 RX ORDER — OMEPRAZOLE 40 MG/1
CAPSULE, DELAYED RELEASE ORAL
Qty: 90 CAPSULE | Refills: 1 | Status: SHIPPED | OUTPATIENT
Start: 2023-01-03 | End: 2023-01-05 | Stop reason: SDUPTHER

## 2023-01-03 RX ORDER — PEN NEEDLE, DIABETIC 32 GX3/16"
NEEDLE, DISPOSABLE MISCELLANEOUS
Qty: 200 EACH | Refills: 1 | Status: SHIPPED | OUTPATIENT
Start: 2023-01-03 | End: 2023-01-05 | Stop reason: SDUPTHER

## 2023-01-03 RX ORDER — GLIPIZIDE 10 MG/1
10 TABLET ORAL
Qty: 180 TABLET | Refills: 1 | Status: SHIPPED | OUTPATIENT
Start: 2023-01-03 | End: 2023-01-05 | Stop reason: SDUPTHER

## 2023-01-05 ENCOUNTER — OFFICE VISIT (OUTPATIENT)
Dept: FAMILY MEDICINE CLINIC | Facility: CLINIC | Age: 73
End: 2023-01-05
Payer: MEDICARE

## 2023-01-05 VITALS
OXYGEN SATURATION: 97 % | DIASTOLIC BLOOD PRESSURE: 64 MMHG | WEIGHT: 202 LBS | HEIGHT: 61 IN | BODY MASS INDEX: 38.14 KG/M2 | HEART RATE: 64 BPM | SYSTOLIC BLOOD PRESSURE: 132 MMHG

## 2023-01-05 DIAGNOSIS — E78.2 MIXED HYPERLIPIDEMIA: ICD-10-CM

## 2023-01-05 DIAGNOSIS — Z53.20 MAMMOGRAM DECLINED: ICD-10-CM

## 2023-01-05 DIAGNOSIS — Z23 NEED FOR INFLUENZA VACCINATION: Primary | ICD-10-CM

## 2023-01-05 DIAGNOSIS — K21.9 GASTROESOPHAGEAL REFLUX DISEASE, UNSPECIFIED WHETHER ESOPHAGITIS PRESENT: ICD-10-CM

## 2023-01-05 DIAGNOSIS — E11.65 TYPE 2 DIABETES MELLITUS WITH HYPERGLYCEMIA, WITHOUT LONG-TERM CURRENT USE OF INSULIN: ICD-10-CM

## 2023-01-05 DIAGNOSIS — B37.31 YEAST VAGINITIS: ICD-10-CM

## 2023-01-05 DIAGNOSIS — Z53.20 COLON CANCER SCREENING DECLINED: ICD-10-CM

## 2023-01-05 DIAGNOSIS — I10 ESSENTIAL HYPERTENSION: ICD-10-CM

## 2023-01-05 PROCEDURE — 90686 IIV4 VACC NO PRSV 0.5 ML IM: CPT | Performed by: NURSE PRACTITIONER

## 2023-01-05 PROCEDURE — G0008 ADMIN INFLUENZA VIRUS VAC: HCPCS | Performed by: NURSE PRACTITIONER

## 2023-01-05 PROCEDURE — 99214 OFFICE O/P EST MOD 30 MIN: CPT | Performed by: NURSE PRACTITIONER

## 2023-01-05 RX ORDER — FLUCONAZOLE 150 MG/1
TABLET ORAL
Qty: 2 TABLET | Refills: 0 | Status: SHIPPED | OUTPATIENT
Start: 2023-01-05 | End: 2023-01-16

## 2023-01-05 RX ORDER — INSULIN DEGLUDEC INJECTION 100 U/ML
INJECTION, SOLUTION SUBCUTANEOUS
COMMUNITY
Start: 2022-10-06 | End: 2023-01-05 | Stop reason: SDUPTHER

## 2023-01-05 RX ORDER — HYDROCODONE BITARTRATE AND ACETAMINOPHEN 7.5; 325 MG/1; MG/1
1 TABLET ORAL EVERY 12 HOURS PRN
COMMUNITY
Start: 2022-12-06

## 2023-01-05 RX ORDER — INSULIN DEGLUDEC INJECTION 100 U/ML
INJECTION, SOLUTION SUBCUTANEOUS
Status: CANCELLED | OUTPATIENT
Start: 2023-01-05

## 2023-01-05 RX ORDER — DULOXETIN HYDROCHLORIDE 60 MG/1
CAPSULE, DELAYED RELEASE ORAL
COMMUNITY
Start: 2022-12-28

## 2023-01-05 RX ORDER — BENAZEPRIL HYDROCHLORIDE 40 MG/1
40 TABLET, FILM COATED ORAL DAILY
Qty: 90 TABLET | Refills: 1 | Status: SHIPPED | OUTPATIENT
Start: 2023-01-05

## 2023-01-05 RX ORDER — TIZANIDINE 4 MG/1
TABLET ORAL
COMMUNITY
Start: 2022-12-28

## 2023-01-05 RX ORDER — GLIPIZIDE 10 MG/1
10 TABLET ORAL
Qty: 180 TABLET | Refills: 1 | Status: SHIPPED | OUTPATIENT
Start: 2023-01-05

## 2023-01-05 RX ORDER — AMLODIPINE BESYLATE 5 MG/1
5 TABLET ORAL DAILY
Qty: 90 TABLET | Refills: 1 | Status: SHIPPED | OUTPATIENT
Start: 2023-01-05

## 2023-01-05 RX ORDER — PEN NEEDLE, DIABETIC 32 GX3/16"
1 NEEDLE, DISPOSABLE MISCELLANEOUS DAILY
Qty: 200 EACH | Refills: 1 | Status: SHIPPED | OUTPATIENT
Start: 2023-01-05

## 2023-01-05 RX ORDER — ATORVASTATIN CALCIUM 10 MG/1
10 TABLET, FILM COATED ORAL DAILY
Qty: 90 TABLET | Refills: 1 | Status: SHIPPED | OUTPATIENT
Start: 2023-01-05

## 2023-01-05 RX ORDER — OMEPRAZOLE 40 MG/1
40 CAPSULE, DELAYED RELEASE ORAL DAILY
Qty: 90 CAPSULE | Refills: 1 | Status: SHIPPED | OUTPATIENT
Start: 2023-01-05

## 2023-01-05 NOTE — PROGRESS NOTES
Chief Complaint  Hypertension, Hyperlipidemia, and Diabetes    SUBJECTIVE  Melania Centeno presents to [facility] 6 month follow up.   Patient here today follow-up on hypertension, diabetes hyperlipidemia.  Reports that she forgot that she was supposed to have a labs done last time, assures me she will have them done this time.      C/o vaginal yeast infection, pt is prone to these, onset about a week.     Patient continues to decline any screenings including colorectal, breast, DEXA scan.  History of Present Illness  Past Medical History:   Diagnosis Date   • Cervical spinal stenosis 04/03/2019    Brisk reflexes suggestive of myelopathy   • Cervicalgia    • Diverticulitis of colon    • Foot pain    • Fusion of spine of cervical region 06/19/2019   • GERD (gastroesophageal reflux disease)    • Hyperlipidemia    • Hypertension    • Type 2 diabetes mellitus (HCC)       Family History   Problem Relation Age of Onset   • Diabetes Mother         Unspecified   • Breast cancer Sister    • Brain cancer Sister    • Colon cancer Sister    • Heart disease Sister    • Heart disease Brother    • Diabetes Brother         Unspecified      Past Surgical History:   Procedure Laterality Date   • BREAST BIOPSY     • CERVICAL FUSION  03/14/2019    right C4-5,C5-6   • CHOLECYSTECTOMY     • KNEE SURGERY     • TUBAL ABDOMINAL LIGATION          Current Outpatient Medications:   •  amLODIPine (NORVASC) 5 MG tablet, Take 1 tablet by mouth Daily., Disp: 90 tablet, Rfl: 1  •  atorvastatin (LIPITOR) 10 MG tablet, Take 1 tablet by mouth Daily., Disp: 90 tablet, Rfl: 1  •  benazepril (LOTENSIN) 40 MG tablet, Take 1 tablet by mouth Daily., Disp: 90 tablet, Rfl: 1  •  glipizide (GLUCOTROL) 10 MG tablet, Take 1 tablet by mouth 2 (Two) Times a Day Before Meals., Disp: 180 tablet, Rfl: 1  •  insulin degludec (TRESIBA FLEXTOUCH) 100 UNIT/ML solution pen-injector injection, Inject 50 Units under the skin into the  appropriate area as directed Daily., Disp: 45 mL, Rfl: 0  •  Insulin Pen Needle (Droplet Pen Needles) 32G X 5 MM misc, Inject 1 each under the skin into the appropriate area as directed Daily., Disp: 200 each, Rfl: 1  •  omeprazole (priLOSEC) 40 MG capsule, Take 1 capsule by mouth Daily., Disp: 90 capsule, Rfl: 1  •  DULoxetine (CYMBALTA) 60 MG capsule, , Disp: , Rfl:   •  fluconazole (Diflucan) 150 MG tablet, 1 tab PO now, repeat in 72 hours, Disp: 2 tablet, Rfl: 0  •  HYDROcodone-acetaminophen (NORCO) 7.5-325 MG per tablet, Take 1 tablet by mouth Every 12 (Twelve) Hours As Needed., Disp: , Rfl:   •  tiZANidine (ZANAFLEX) 4 MG tablet, , Disp: , Rfl:     OBJECTIVE  Vital Signs:   /64   Pulse 64   Ht 154.9 cm (61\")   Wt 91.6 kg (202 lb)   SpO2 97%   BMI 38.17 kg/m²    Estimated body mass index is 38.17 kg/m² as calculated from the following:    Height as of this encounter: 154.9 cm (61\").    Weight as of this encounter: 91.6 kg (202 lb).     Wt Readings from Last 3 Encounters:   01/05/23 91.6 kg (202 lb)   06/28/22 90.7 kg (200 lb)   12/23/21 88 kg (194 lb)     BP Readings from Last 3 Encounters:   01/05/23 132/64   06/28/22 153/72   12/23/21 134/78       Physical Exam  Vitals reviewed.   Constitutional:       Appearance: Normal appearance. She is well-developed.   HENT:      Head: Normocephalic and atraumatic.      Right Ear: External ear normal.      Left Ear: External ear normal.   Eyes:      Conjunctiva/sclera: Conjunctivae normal.      Pupils: Pupils are equal, round, and reactive to light.   Cardiovascular:      Rate and Rhythm: Normal rate and regular rhythm.      Heart sounds: No murmur heard.    No friction rub. No gallop.   Pulmonary:      Effort: Pulmonary effort is normal.      Breath sounds: Normal breath sounds. No wheezing or rhonchi.   Skin:     General: Skin is warm and dry.   Neurological:      Mental Status: She is alert and oriented to person, place, and time.      Cranial Nerves: No  cranial nerve deficit.   Psychiatric:         Mood and Affect: Mood and affect normal.         Behavior: Behavior normal.         Thought Content: Thought content normal.         Judgment: Judgment normal.          Result Review                          No Images in the past 120 days found..     The above data has been reviewed by CHINA Pinedo 01/05/2023 10:33 EST.          Patient Care Team:  Kelsey Hoyos APRN as PCP - General (Nurse Practitioner)    Class 2 Severe Obesity (BMI >=35 and <=39.9). Obesity-related health conditions include the following: hypertension and diabetes mellitus. Obesity is unchanged. BMI is is above average; BMI management plan is completed. We discussed portion control and increasing exercise.       ASSESSMENT & PLAN    Diagnoses and all orders for this visit:    1. Need for influenza vaccination (Primary)  -     FluLaval/Fluzone >6 mos ([id number])    2. Essential hypertension  Assessment & Plan:  Hypertension in fair control, continue current medications, weight loss would be of benefit    Orders:  -     amLODIPine (NORVASC) 5 MG tablet; Take 1 tablet by mouth Daily.  Dispense: 90 tablet; Refill: 1  -     benazepril (LOTENSIN) 40 MG tablet; Take 1 tablet by mouth Daily.  Dispense: 90 tablet; Refill: 1    3. Gastroesophageal reflux disease, unspecified whether esophagitis present  -     omeprazole (priLOSEC) 40 MG capsule; Take 1 capsule by mouth Daily.  Dispense: 90 capsule; Refill: 1    4. Type 2 diabetes mellitus with hyperglycemia, without long-term current use of insulin ([facility])  Assessment & Plan:  Current diabetic control unknown, patient did not go and have labs drawn from last office visit, stressed the importance of having these labs done today, patient assures me she will.  Continue current medications pending results    Orders:  -     insulin degludec (TRESIBA FLEXTOUCH) 100 UNIT/ML solution pen-injector injection; Inject 50 Units under the skin into the  appropriate area as directed Daily.  Dispense: 45 mL; Refill: 0  -     glipizide (GLUCOTROL) 10 MG tablet; Take 1 tablet by mouth 2 (Two) Times a Day Before Meals.  Dispense: 180 tablet; Refill: 1  -     Insulin Pen Needle (Droplet Pen Needles) 32G X 5 MM misc; Inject 1 each under the skin into the appropriate area as directed Daily.  Dispense: 200 each; Refill: 1    5. Mixed hyperlipidemia  Assessment & Plan:  Stable with atorvastatin, continue current dose    Orders:  -     atorvastatin (LIPITOR) 10 MG tablet; Take 1 tablet by mouth Daily.  Dispense: 90 tablet; Refill: 1    6. Mammogram declined    7. Colon cancer screening declined    8. Yeast vaginitis  -     fluconazole (Diflucan) 150 MG tablet; 1 tab PO now, repeat in 72 hours  Dispense: 2 tablet; Refill: 0       Tobacco Use: Low Risk    • Smoking Tobacco Use: Never   • Smokeless Tobacco Use: Never   • Passive Exposure: Not on file       Follow Up     Return in about 3 months (around 4/5/2023).        Patient was given instructions and counseling regarding her condition or for health maintenance advice. Please see specific information pulled into the AVS if appropriate.   I have reviewed information obtained and documented by others and I have confirmed the accuracy of this documented note.    CHINA Pinedo

## 2023-01-05 NOTE — ASSESSMENT & PLAN NOTE
Current diabetic control unknown, patient did not go and have labs drawn from last office visit, stressed the importance of having these labs done today, patient assures me she will.  Continue current medications pending results

## 2023-01-13 DIAGNOSIS — B37.31 YEAST VAGINITIS: ICD-10-CM

## 2023-01-16 RX ORDER — FLUCONAZOLE 150 MG/1
TABLET ORAL
Qty: 2 TABLET | Refills: 0 | Status: SHIPPED | OUTPATIENT
Start: 2023-01-16

## 2023-03-30 ENCOUNTER — LAB (OUTPATIENT)
Dept: LAB | Facility: HOSPITAL | Age: 73
End: 2023-03-30
Payer: MEDICARE

## 2023-03-30 DIAGNOSIS — I10 ESSENTIAL HYPERTENSION: ICD-10-CM

## 2023-03-30 DIAGNOSIS — Z13.29 SCREENING FOR THYROID DISORDER: ICD-10-CM

## 2023-03-30 DIAGNOSIS — E78.2 MIXED HYPERLIPIDEMIA: ICD-10-CM

## 2023-03-30 DIAGNOSIS — E11.65 TYPE 2 DIABETES MELLITUS WITH HYPERGLYCEMIA, WITHOUT LONG-TERM CURRENT USE OF INSULIN: ICD-10-CM

## 2023-03-30 LAB
ALBUMIN UR-MCNC: 1.8 MG/DL
BASOPHILS # BLD AUTO: 0.04 10*3/MM3 (ref 0–0.2)
BASOPHILS NFR BLD AUTO: 0.8 % (ref 0–1.5)
CREAT UR-MCNC: 123.7 MG/DL
DEPRECATED RDW RBC AUTO: 44.1 FL (ref 37–54)
EOSINOPHIL # BLD AUTO: 0.15 10*3/MM3 (ref 0–0.4)
EOSINOPHIL NFR BLD AUTO: 2.9 % (ref 0.3–6.2)
ERYTHROCYTE [DISTWIDTH] IN BLOOD BY AUTOMATED COUNT: 13.3 % (ref 12.3–15.4)
HCT VFR BLD AUTO: 41.9 % (ref 34–46.6)
HGB BLD-MCNC: 14.1 G/DL (ref 12–15.9)
IMM GRANULOCYTES # BLD AUTO: 0.01 10*3/MM3 (ref 0–0.05)
IMM GRANULOCYTES NFR BLD AUTO: 0.2 % (ref 0–0.5)
LYMPHOCYTES # BLD AUTO: 2.08 10*3/MM3 (ref 0.7–3.1)
LYMPHOCYTES NFR BLD AUTO: 39.5 % (ref 19.6–45.3)
MCH RBC QN AUTO: 30.3 PG (ref 26.6–33)
MCHC RBC AUTO-ENTMCNC: 33.7 G/DL (ref 31.5–35.7)
MCV RBC AUTO: 89.9 FL (ref 79–97)
MICROALBUMIN/CREAT UR: 14.6 MG/G
MONOCYTES # BLD AUTO: 0.47 10*3/MM3 (ref 0.1–0.9)
MONOCYTES NFR BLD AUTO: 8.9 % (ref 5–12)
NEUTROPHILS NFR BLD AUTO: 2.51 10*3/MM3 (ref 1.7–7)
NEUTROPHILS NFR BLD AUTO: 47.7 % (ref 42.7–76)
NRBC BLD AUTO-RTO: 0 /100 WBC (ref 0–0.2)
PLATELET # BLD AUTO: 111 10*3/MM3 (ref 140–450)
PMV BLD AUTO: 12 FL (ref 6–12)
RBC # BLD AUTO: 4.66 10*6/MM3 (ref 3.77–5.28)
WBC NRBC COR # BLD: 5.26 10*3/MM3 (ref 3.4–10.8)

## 2023-03-30 PROCEDURE — 36415 COLL VENOUS BLD VENIPUNCTURE: CPT

## 2023-03-30 PROCEDURE — 82043 UR ALBUMIN QUANTITATIVE: CPT

## 2023-03-30 PROCEDURE — 84443 ASSAY THYROID STIM HORMONE: CPT

## 2023-03-30 PROCEDURE — 82570 ASSAY OF URINE CREATININE: CPT

## 2023-03-30 PROCEDURE — 85025 COMPLETE CBC W/AUTO DIFF WBC: CPT

## 2023-03-30 PROCEDURE — 80061 LIPID PANEL: CPT

## 2023-03-30 PROCEDURE — 83036 HEMOGLOBIN GLYCOSYLATED A1C: CPT

## 2023-03-30 PROCEDURE — 80053 COMPREHEN METABOLIC PANEL: CPT

## 2023-03-31 DIAGNOSIS — E11.65 UNCONTROLLED TYPE 2 DIABETES MELLITUS WITH HYPERGLYCEMIA: ICD-10-CM

## 2023-03-31 DIAGNOSIS — D69.6 PLATELETS DECREASED: Primary | ICD-10-CM

## 2023-03-31 LAB
ALBUMIN SERPL-MCNC: 4 G/DL (ref 3.5–5.2)
ALBUMIN/GLOB SERPL: 1.1 G/DL
ALP SERPL-CCNC: 196 U/L (ref 39–117)
ALT SERPL W P-5'-P-CCNC: 31 U/L (ref 1–33)
ANION GAP SERPL CALCULATED.3IONS-SCNC: 8 MMOL/L (ref 5–15)
AST SERPL-CCNC: 35 U/L (ref 1–32)
BILIRUB SERPL-MCNC: 0.5 MG/DL (ref 0–1.2)
BUN SERPL-MCNC: 10 MG/DL (ref 8–23)
BUN/CREAT SERPL: 15.4 (ref 7–25)
CALCIUM SPEC-SCNC: 9.1 MG/DL (ref 8.6–10.5)
CHLORIDE SERPL-SCNC: 100 MMOL/L (ref 98–107)
CHOLEST SERPL-MCNC: 144 MG/DL (ref 0–200)
CO2 SERPL-SCNC: 28 MMOL/L (ref 22–29)
CREAT SERPL-MCNC: 0.65 MG/DL (ref 0.57–1)
EGFRCR SERPLBLD CKD-EPI 2021: 94.3 ML/MIN/1.73
GLOBULIN UR ELPH-MCNC: 3.5 GM/DL
GLUCOSE SERPL-MCNC: 307 MG/DL (ref 65–99)
HBA1C MFR BLD: 10 % (ref 4.8–5.6)
HDLC SERPL-MCNC: 63 MG/DL (ref 40–60)
LDLC SERPL CALC-MCNC: 57 MG/DL (ref 0–100)
LDLC/HDLC SERPL: 0.84 {RATIO}
POTASSIUM SERPL-SCNC: 4.2 MMOL/L (ref 3.5–5.2)
PROT SERPL-MCNC: 7.5 G/DL (ref 6–8.5)
SODIUM SERPL-SCNC: 136 MMOL/L (ref 136–145)
TRIGL SERPL-MCNC: 141 MG/DL (ref 0–150)
TSH SERPL DL<=0.05 MIU/L-ACNC: 2.16 UIU/ML (ref 0.27–4.2)
VLDLC SERPL-MCNC: 24 MG/DL (ref 5–40)

## 2023-04-03 DIAGNOSIS — K74.60 HEPATIC CIRRHOSIS, UNSPECIFIED HEPATIC CIRRHOSIS TYPE, UNSPECIFIED WHETHER ASCITES PRESENT: ICD-10-CM

## 2023-04-03 DIAGNOSIS — R79.89 ELEVATED LFTS: Primary | ICD-10-CM

## 2023-04-06 ENCOUNTER — TELEPHONE (OUTPATIENT)
Dept: FAMILY MEDICINE CLINIC | Facility: CLINIC | Age: 73
End: 2023-04-06
Payer: MEDICARE

## 2023-04-06 NOTE — TELEPHONE ENCOUNTER
Caller: Melania Centeno    Relationship to patient: Self    Best call back number: [phone]    Patient is needing: PATIENT IS RETURNING CALL AND SHE HAS QUESTIONS ABOUT MESSAGE THAT WAS LEFT. PLEASE CALL PATIENT.

## 2023-04-06 NOTE — TELEPHONE ENCOUNTER
CALLED SPOKE WITH PT ADVISED REFERRAL FOR GASTRO WAS PLACED AND SOMEONE WOULD CONTACT HER FROM SCHEDULING PT OK

## 2023-04-13 ENCOUNTER — OFFICE VISIT (OUTPATIENT)
Dept: GASTROENTEROLOGY | Facility: CLINIC | Age: 73
End: 2023-04-13
Payer: MEDICARE

## 2023-04-13 ENCOUNTER — PREP FOR SURGERY (OUTPATIENT)
Dept: OTHER | Facility: HOSPITAL | Age: 73
End: 2023-04-13
Payer: MEDICARE

## 2023-04-13 VITALS
BODY MASS INDEX: 38.06 KG/M2 | SYSTOLIC BLOOD PRESSURE: 158 MMHG | HEART RATE: 70 BPM | WEIGHT: 201.6 LBS | DIASTOLIC BLOOD PRESSURE: 78 MMHG | HEIGHT: 61 IN

## 2023-04-13 DIAGNOSIS — R10.11 RIGHT UPPER QUADRANT ABDOMINAL PAIN: ICD-10-CM

## 2023-04-13 DIAGNOSIS — R12 HEARTBURN: ICD-10-CM

## 2023-04-13 DIAGNOSIS — R74.8 ELEVATED ALKALINE PHOSPHATASE LEVEL: ICD-10-CM

## 2023-04-13 DIAGNOSIS — K74.69 OTHER CIRRHOSIS OF LIVER: Primary | ICD-10-CM

## 2023-04-13 DIAGNOSIS — E66.01 CLASS 2 SEVERE OBESITY WITH SERIOUS COMORBIDITY AND BODY MASS INDEX (BMI) OF 38.0 TO 38.9 IN ADULT, UNSPECIFIED OBESITY TYPE: ICD-10-CM

## 2023-04-13 PROCEDURE — 99214 OFFICE O/P EST MOD 30 MIN: CPT | Performed by: NURSE PRACTITIONER

## 2023-04-13 PROCEDURE — 1160F RVW MEDS BY RX/DR IN RCRD: CPT | Performed by: NURSE PRACTITIONER

## 2023-04-13 PROCEDURE — 3078F DIAST BP <80 MM HG: CPT | Performed by: NURSE PRACTITIONER

## 2023-04-13 PROCEDURE — 1159F MED LIST DOCD IN RCRD: CPT | Performed by: NURSE PRACTITIONER

## 2023-04-13 PROCEDURE — 3077F SYST BP >= 140 MM HG: CPT | Performed by: NURSE PRACTITIONER

## 2023-04-13 NOTE — PATIENT INSTRUCTIONS
Fatty Liver Disease  The liver converts food into energy, removes toxic material from the blood, makes important proteins, and absorbs necessary vitamins from food. Fatty liver disease occurs when too much fat has built up in your liver cells. Fatty liver disease is also called hepatic steatosis.  In many cases, fatty liver disease does not cause symptoms or problems. It is often diagnosed when tests are being done for other reasons. However, over time, fatty liver can cause inflammation that may lead to more serious liver problems, such as scarring of the liver (cirrhosis) and liver failure.  Fatty liver is associated with insulin resistance, increased body fat, high blood pressure (hypertension), and high cholesterol. These are features of metabolic syndrome and increase your risk for stroke, diabetes, and heart disease.  What are the causes?  This condition may be caused by components of metabolic syndrome:  Obesity.  Insulin resistance.  High cholesterol.  Other causes:  Alcohol abuse.  Poor nutrition.  Cushing syndrome.  Pregnancy.  Certain drugs.  Poisons.  Some viral infections.  What increases the risk?  You are more likely to develop this condition if you:  Abuse alcohol.  Are overweight.  Have diabetes.  Have hepatitis.  Have a high triglyceride level.  Are pregnant.  What are the signs or symptoms?  Fatty liver disease often does not cause symptoms. If symptoms do develop, they can include:  Fatigue and weakness.  Weight loss.  Confusion.  Nausea, vomiting, or abdominal pain.  Yellowing of your skin and the white parts of your eyes (jaundice).  Itchy skin.  How is this diagnosed?  This condition may be diagnosed by:  A physical exam and your medical history.  Blood tests.  Imaging tests, such as an ultrasound, CT scan, or MRI.  A liver biopsy. A small sample of liver tissue is removed using a needle. The sample is then looked at under a microscope.  How is this treated?  Fatty liver disease is often  caused by other health conditions. Treatment for fatty liver may involve medicines and lifestyle changes to manage conditions such as:  Alcoholism.  High cholesterol.  Diabetes.  Being overweight or obese.  Follow these instructions at home:    Do not drink alcohol. If you have trouble quitting, ask your health care provider how to safely quit with the help of medicine or a supervised program. This is important to keep your condition from getting worse.  Eat a healthy diet as told by your health care provider. Ask your health care provider about working with a dietitian to develop an eating plan.  Exercise regularly. This can help you lose weight and control your cholesterol and diabetes. Talk to your health care provider about an exercise plan and which activities are best for you.  Take over-the-counter and prescription medicines only as told by your health care provider.  Keep all follow-up visits. This is important.  Contact a health care provider if:  You have trouble controlling your:  Blood sugar. This is especially important if you have diabetes.  Cholesterol.  Drinking of alcohol.  Get help right away if:  You have abdominal pain.  You have jaundice.  You have nausea and are vomiting.  You vomit blood or material that looks like coffee grounds.  You have stools that are black, tar-like, or bloody.  Summary  Fatty liver disease develops when too much fat builds up in the cells of your liver.  Fatty liver disease often causes no symptoms or problems. However, over time, fatty liver can cause inflammation that may lead to more serious liver problems, such as scarring of the liver (cirrhosis).  You are more likely to develop this condition if you abuse alcohol, are pregnant, are overweight, have diabetes, have hepatitis, or have high triglyceride or cholesterol levels.  Contact your health care provider if you have trouble controlling your blood sugar, cholesterol, or drinking of alcohol.  This information is  not intended to replace advice given to you by your health care provider. Make sure you discuss any questions you have with your health care provider.  Document Revised: 09/30/2021 Document Reviewed: 09/30/2021  Elsevier Patient Education © 2022 [employer]      Cirrhosis  Cirrhosis is long-term (chronic) liver injury. The liver is the body's largest internal organ, and it performs many functions. It converts food into energy, removes toxic material from the blood, makes important proteins, and absorbs necessary vitamins from food.  In cirrhosis, healthy liver cells are replaced by scar tissue. This prevents blood from flowing through the liver and makes it difficult for the liver to complete its functions.  What are the causes?  Common causes of this condition are hepatitis C and long-term alcohol abuse. Other causes include:  Nonalcoholic fatty liver disease (NAFLD). This happens when fat is deposited in the liver by causes other than alcohol.  Hepatitis B infection.  Autoimmune hepatitis. In this condition, the body's defense system (immune system) mistakenly attacks the liver cells, causing inflammation.  Diseases that cause blockage of ducts inside the liver.  Inherited liver diseases, such as hemochromatosis. This is one of the most common inherited liver diseases. In this disease, deposits of iron collect in the liver and other organs.  Reactions to certain long-term medicines, such as amiodarone, a heart medicine.  Parasitic infections. These include schistosomiasis, which is caused by a flatworm.  Long-term contact to certain toxins. These toxins include certain organic solvents, such as toluene and chloroform.  What increases the risk?  You are more likely to develop this condition if:  You have certain types of viral hepatitis.  You abuse alcohol, especially if you are female.  You are overweight.  You use IV drugs and share needles.  You have unprotected sex with someone who has viral hepatitis.  What  are the signs or symptoms?  You may not have any signs and symptoms at first. Symptoms may not develop until the damage to your liver starts to get worse.  Early symptoms may include:  Weakness and tiredness (fatigue).  Changes in sleep patterns or having trouble sleeping.  Itchiness.  Tenderness in the right-upper part of your abdomen.  Weight loss and muscle loss.  Nausea.  Loss of appetite.  Later symptoms may include:  Fatigue or weakness that is getting worse.  Yellow skin and eyes (jaundice).  Buildup of fluid in the abdomen (ascites). You may notice that your clothes are tight around your waist.  Weight gain and swelling of the feet and ankles (edema).  Trouble breathing.  Easy bruising and bleeding.  Vomiting blood, or black or bloody stool.  Mental confusion.  How is this diagnosed?  Your health care provider may suspect cirrhosis based on your symptoms and medical history, especially if you have other medical conditions or a history of alcohol abuse. Your health care provider will do a physical exam to feel your liver and to check for signs of cirrhosis. Tests may include:  Blood tests to check:  For hepatitis B or C.  Kidney function.  Liver function.  Imaging tests such as:  MRI or CT scan to look for changes seen in advanced cirrhosis.  Ultrasound to see if normal liver tissue is being replaced by scar tissue.  A procedure in which a long needle is used to take a sample of liver tissue to be checked in a lab (biopsy). Liver biopsy can confirm the diagnosis of cirrhosis.  How is this treated?  Treatment for this condition depends on how damaged your liver is and what caused the damage. It may include treating the symptoms of cirrhosis, or treating the underlying causes to slow the damage. Treatment may include:  Making lifestyle changes, such as:  Eating a healthy diet. You may need to work with your health care provider or a dietitian to develop an eating plan.  Restricting salt intake.  Maintaining a  healthy weight.  Not abusing drugs or alcohol.  Taking medicines to:  Treat liver infections or other infections.  Control itching.  Reduce fluid buildup.  Reduce certain blood toxins.  Reduce risk of bleeding from enlarged blood vessels in the stomach or esophagus (varices).  Liver transplant. In this procedure, a liver from a donor is used to replace your diseased liver. This is done if cirrhosis has caused liver failure.  Other treatments and procedures may be done depending on the problems that you get from cirrhosis. Common problems include liver-related kidney failure (hepatorenal syndrome).  Follow these instructions at home:    Take medicines only as told by your health care provider. Do not use medicines that are toxic to your liver. Ask your health care provider before taking any new medicines, including over-the-counter medicines such as NSAIDs.  Rest as needed.  Eat a well-balanced diet.  Limit your salt or water intake, if your health care provider asks you to do this.  Do not drink alcohol. This is especially important if you routinely take acetaminophen.  Keep all follow-up visits. This is important.  Contact a health care provider if you:  Have fatigue or weakness that is getting worse.  Develop swelling of the hands, feet, or legs, or a buildup of fluid in the abdomen (ascites).  Have a fever or chills.  Develop loss of appetite.  Have nausea or vomiting.  Develop jaundice.  Develop easy bruising or bleeding.  Get help right away if you:  Vomit bright red blood or a material that looks like coffee grounds.  Have blood in your stools.  Notice that your stools appear black and tarry.  Become confused.  Have chest pain or trouble breathing.  These symptoms may represent a serious problem that is an emergency. Do not wait to see if the symptoms will go away. Get medical help right away. Call your local emergency services (911 in the [place] Do not drive yourself to the hospital.  Summary  Cirrhosis is  chronic liver injury. Common causes are hepatitis C and long-term alcohol abuse.  Tests used to diagnose cirrhosis include blood tests, imaging tests, and liver biopsy.  Treatment for this condition involves treating the underlying cause. Avoid alcohol, drugs, salt, and medicines that may damage your liver.  Get help right away if you vomit bright red blood or a material that looks like coffee grounds.  This information is not intended to replace advice given to you by your health care provider. Make sure you discuss any questions you have with your health care provider.  Document Revised: 09/30/2021 Document Reviewed: 09/30/2021  Elsevier Patient Education © 2022 [employer] Inc[employer]

## 2023-04-13 NOTE — PROGRESS NOTES
Patient Name: Melania Centeno   Visit Date: 04/13/2023   Patient ID: [id number]  Provider: CHINA Stewart    Sex: female  Location:  Location Address:  Location Phone: [phone]  [place] [address] [phone]    YOB: 1950  Age: 72 y.o.   Primary Care Provider Kelsey Hoyos APRN      Referring Provider: CHINA Saldana        Chief Complaint  Elevated Hepatic Enzymes and Abdominal Pain (RUQ ABD Pain, started 2 months ago, daily )    History of Present Illness    Patient was last seen March 2021 for cirrhosis, noted on CT scan.  She reported unintentional weight loss of 40 pounds.  Alk phos was elevated, acute hepatitis panel was negative in 2020.  Full liver work-up was ordered and EGD colonoscopy, patient did not complete scopes. She states today she didn't know they were ordered for her.   NO ETOH x 20 yrs, prior to was very sporadic and not on regular basis.    3/29/2021 liver work-up showed elevated alk phos at 211, INR 0.96, otherwise negative work-up  Last imaging from March 2021 CT abdomen pelvis without contrast showed diffuse fatty liver with hepatomegaly    Pt has gained 9# since last visit  Pt states a couple months ago started getting RUQ pain, pt has had stabbing pain in RUQ now. Aggravated by bra. No relationship w food. Trouble getting comfortable.No N/V.    Denies HB w Omeprazole  No blood in stool or black stool, some constipation w Norco, then may have a day of multiple BM's.   NO s/s of HE noted today.     Denies cardio or pulmonary issues  Past Medical History:   Diagnosis Date   • Cervical spinal stenosis 04/03/2019    Brisk reflexes suggestive of myelopathy   • Cervicalgia    • Diverticulitis of colon    • Foot pain    • Fusion of spine of cervical region 06/19/2019   • GERD (gastroesophageal reflux disease)    • Hyperlipidemia    • Hypertension    • Type 2 diabetes mellitus        Past Surgical History:   Procedure Laterality Date   • BREAST  "BIOPSY     • CERVICAL FUSION  03/14/2019    right C4-5,C5-6   • CHOLECYSTECTOMY     • COLONOSCOPY     • KNEE SURGERY     • TUBAL ABDOMINAL LIGATION         No Known Allergies    Family History   Problem Relation Age of Onset   • Diabetes Mother         Unspecified   • Breast cancer Sister    • Brain cancer Sister    • Colon cancer Sister 62   • Heart disease Sister    • Heart disease Brother    • Diabetes Brother         Unspecified        Social History     Tobacco Use   • Smoking status: Never   • Smokeless tobacco: Never   Vaping Use   • Vaping Use: Never used   Substance Use Topics   • Alcohol use: Never   • Drug use: No       Objective     Vital Signs:   /78 (BP Location: Left arm, Patient Position: Sitting, Cuff Size: Adult)   Pulse 70   Ht 154.9 cm (61\")   Wt 91.4 kg (201 lb 9.6 oz)   BMI 38.09 kg/m²       Physical Exam  Constitutional:       General: The patient is not in acute distress.     Appearance: Normal appearance.   HENT:      Head: Normocephalic and atraumatic.      Nose: Nose normal.   Pulmonary:      Effort: Pulmonary effort is normal. No respiratory distress.   Abdominal:      General: Abdomen is flat.      Palpations: Abdomen is soft. There is no mass.      Tenderness: There is no abdominal tenderness---x tender in RUQ . There is no guarding.   Musculoskeletal:      Cervical back: Neck supple.      Right lower leg: No edema.      Left lower leg: No edema.   Skin:     General: Skin is warm and dry.   Neurological:      General: No focal deficit present.      Mental Status: The patient is alert and oriented to person, place, and time.      Gait: Gait normal.   Psychiatric:         Mood and Affect: Mood normal.         Speech: Speech normal.         Behavior: Behavior normal.         Thought Content: Thought content normal.     Result Review :   The following data was reviewed by: CHINA Stewart on 04/13/2023:    CBC w/diff        3/30/2023    13:02   CBC w/Diff   WBC 5.26  "    RBC 4.66     Hemoglobin 14.1     Hematocrit 41.9     MCV 89.9     MCH 30.3     MCHC 33.7     RDW 13.3     Platelets 111     Neutrophil Rel % 47.7     Immature Granulocyte Rel % 0.2     Lymphocyte Rel % 39.5     Monocyte Rel % 8.9     Eosinophil Rel % 2.9     Basophil Rel % 0.8       CMP        3/30/2023    13:02   CMP   Glucose 307     BUN 10     Creatinine 0.65     EGFR 94.3     Sodium 136     Potassium 4.2     Chloride 100     Calcium 9.1     Total Protein 7.5     Albumin 4.0     Globulin 3.5     Total Bilirubin 0.5     Alkaline Phosphatase 196     AST (SGOT) 35     ALT (SGPT) 31     Albumin/Globulin Ratio 1.1     BUN/Creatinine Ratio 15.4     Anion Gap 8.0         Liver Workup   A-1 Antitrypsin   Date Value Ref Range Status   03/29/2021 153 101 - 187 mg/dL Final     ds DNA Antibody   Date Value Ref Range Status   03/29/2021 NEGATIVE [IU]/mL Final     Ceruloplasmin   Date Value Ref Range Status   03/29/2021 27.3 19.0 - 39.0 mg/dL Final     Ferritin   Date Value Ref Range Status   03/29/2021 289 (H) 10 - 200 ng/mL Final     Comment:     <10 ng/ml usually associated with Iron Deficiency Anemia.  Above normal range levels may be due to Hepatic and/or  Chronic Inflammatory Disease.       Immunofixation Result, Serum   Date Value Ref Range Status   03/29/2021 COMMENT NA Final     Comment:     No monoclonality detected.     IgA   Date Value Ref Range Status   03/29/2021 319 87 - 352 mg/dL Final     Iron   Date Value Ref Range Status   03/29/2021 87 60 - 170 ug/dL Final     TIBC   Date Value Ref Range Status   03/29/2021 317 245 - 450 ug/dL Final     Comment:     As of 10/15/03, the chemistry department began utilizing a Transferrin  assay. Data suggests that Transferrin is a better estimate of Total Iron  binding capacity than the TIBC assay. As a result the TIBC will now be a  calculated estimate from the measured Transferrin.       Iron Saturation   Date Value Ref Range Status   03/29/2021 27 20 - 55 % Final      Transferrin   Date Value Ref Range Status   03/29/2021 222.00 (L) 250.00 - 380.00 mg/dL Final     Mitochondrial Ab   Date Value Ref Range Status   03/29/2021 <20.0 0.0 - 20.0 Units Final     Comment:                                                    Negative    0.0 - 20.0                                                 Equivocal  20.1 - 24.9                                                 Positive         >24.9                 Mitochondrial (M2) Antibodies are found in 90-96% of                 patients with primary biliary cirrhosis.       Protime   Date Value Ref Range Status   03/29/2021 10.6 9.4 - 12.0 s Final     INR   Date Value Ref Range Status   03/29/2021 0.96 (L) 2.00 - 3.00 NA Final     Comment:     Suggested Therapeutic Ranges For Oral Anticoagulant Therapy:  Level of Therapy                      INR Target Range  Standard Dose                            2.0-3.0  High Dose                                2.5-3.5  Patients not receiving anticoagulant  Therapy Normal Range                     0.6-1.2       AFP   Date Value Ref Range Status   03/29/2021 4.1 0.0 - 8.7 ng/mL Final     Comment:     Test Information: AFP (Tumor Marker)      The Roche e601 by [employer] method was used to perform this  test. Results obtained with different assay methods cannot be  used interchangeably.  This result is not absolute evidence of the presence or absence  of Malignant disease. AFP results should be considered with  clinical evaluation and other diagnostic procedures.  ---------------------------------------------------------------  AFP Values in Benign and Malignant Disease  Sample Category  N   0-8.7  8.8-15.0 15.1-20.0 20.1-100  >100  Appar Healthy   140   136     4          0         0       0  males            70   68      2          0         0       0  females          70   68      2          0         0       0  Malig.Diseases  165   112     6          4        15      28  Test.Cancer  -seminoma        9     6       1          0         2       0  -non-seminoma    62   22      3          4        11      22  Liver Cancer     12    8      0          0         1       3  Other Cancer  -colorectal.     13   12      1          0         0       0  -genitourinary   36   35      1          0         0       0  -ovarian          8    6      0          0         0       2  -pancreatic       8    8      0          0         0       0  -breast           5    5      0          0         0       0  -stomach          2    1      0          0         1       0  -other           10    9      0          0         0       1  Benign Diseases  76   74      1          0         1       0  -cirrhosis       16   16      0          0         0       0  -hepatitis       17   15      1          0         1       0  -pancreatitis     5    5      0          0         0       0  -gi and pid      10   10      0          0         0       0  -BPH             21   21      0          0         0       0  -urogenital       7    7      0          0         0       0  In this study,95% of the apparently healthy subjects had  AFP values < or =8.30 ng/ml.  -------------------------------------------------------------       Acute HEP Panel   Hep A IgM   Date Value Ref Range Status   06/10/2020 Negative Negative NA Final     Hep B Core IgM   Date Value Ref Range Status   06/10/2020 Negative Negative NA Final   HEP C AB NEGATIVE  6/10/2020            Assessment and Plan    Diagnoses and all orders for this visit:    1. Other cirrhosis of liver (Primary)  -     polyethylene glycol (GoLYTELY) 236 g solution; Take per office instructions  Dispense: 4000 mL; Refill: 0  -     MRI abdomen w wo contrast mrcp; Future    2. Elevated alkaline phosphatase level  -     polyethylene glycol (GoLYTELY) 236 g solution; Take per office instructions  Dispense: 4000 mL; Refill: 0  -     MRI abdomen w wo contrast mrcp; Future    3. Right upper quadrant abdominal pain  -      polyethylene glycol (GoLYTELY) 236 g solution; Take per office instructions  Dispense: 4000 mL; Refill: 0  -     MRI abdomen w wo contrast mrcp; Future    4. Class 2 severe obesity with serious comorbidity and body mass index (BMI) of 38.0 to 38.9 in adult, unspecified obesity type    5. Heartburn  Comments:  stable w PPI              Follow Up   Return for follow up after procedure.   MRI[facility]MRCP at [facility] for RUQ pain and elev alkphos, cirrhosis. Explained to pt she would need liver US q 6 months for monitoring of cirrhosis  Encouraged wt loss, low carb diet, and 2-4 c. Coffee/d recommended- pt states she doesn't like it  EGD /COLONOSCOPY Surgical Risk and Benefits: Possible risks/complications, benefits, and alternatives to surgical or invasive procedure have been explained to patient and/or legal guardian; risks include bleeding, infection, and perforation. Patient has been evaluated and can tolerate anesthesia and/or sedation. Risks, benefits, and alternatives to anesthesia and sedation have been explained to patient and/or legal guardian.   Check PT/INR, AFP     Patient was given instructions and counseling regarding her condition or for health maintenance advice. Please see specific information pulled into the AVS if appropriate.

## 2023-04-27 ENCOUNTER — LAB (OUTPATIENT)
Dept: LAB | Facility: HOSPITAL | Age: 73
End: 2023-04-27
Payer: MEDICARE

## 2023-04-27 DIAGNOSIS — K74.69 OTHER CIRRHOSIS OF LIVER: ICD-10-CM

## 2023-04-27 DIAGNOSIS — D69.6 PLATELETS DECREASED: ICD-10-CM

## 2023-04-27 LAB
ALPHA-FETOPROTEIN: 3.84 NG/ML (ref 0–8.3)
BASOPHILS # BLD AUTO: 0.03 10*3/MM3 (ref 0–0.2)
BASOPHILS NFR BLD AUTO: 0.5 % (ref 0–1.5)
DEPRECATED RDW RBC AUTO: 44.4 FL (ref 37–54)
EOSINOPHIL # BLD AUTO: 0.14 10*3/MM3 (ref 0–0.4)
EOSINOPHIL NFR BLD AUTO: 2.1 % (ref 0.3–6.2)
ERYTHROCYTE [DISTWIDTH] IN BLOOD BY AUTOMATED COUNT: 13.3 % (ref 12.3–15.4)
HCT VFR BLD AUTO: 43.1 % (ref 34–46.6)
HGB BLD-MCNC: 14.5 G/DL (ref 12–15.9)
IMM GRANULOCYTES # BLD AUTO: 0.01 10*3/MM3 (ref 0–0.05)
IMM GRANULOCYTES NFR BLD AUTO: 0.2 % (ref 0–0.5)
INR PPP: 1.03 (ref 0.86–1.15)
LYMPHOCYTES # BLD AUTO: 2.29 10*3/MM3 (ref 0.7–3.1)
LYMPHOCYTES NFR BLD AUTO: 35 % (ref 19.6–45.3)
MCH RBC QN AUTO: 30.4 PG (ref 26.6–33)
MCHC RBC AUTO-ENTMCNC: 33.6 G/DL (ref 31.5–35.7)
MCV RBC AUTO: 90.4 FL (ref 79–97)
MONOCYTES # BLD AUTO: 0.6 10*3/MM3 (ref 0.1–0.9)
MONOCYTES NFR BLD AUTO: 9.2 % (ref 5–12)
NEUTROPHILS NFR BLD AUTO: 3.47 10*3/MM3 (ref 1.7–7)
NEUTROPHILS NFR BLD AUTO: 53 % (ref 42.7–76)
NRBC BLD AUTO-RTO: 0 /100 WBC (ref 0–0.2)
PLATELET # BLD AUTO: 129 10*3/MM3 (ref 140–450)
PMV BLD AUTO: 12.2 FL (ref 6–12)
PROTHROMBIN TIME: 13.6 SECONDS (ref 11.8–14.9)
RBC # BLD AUTO: 4.77 10*6/MM3 (ref 3.77–5.28)
WBC NRBC COR # BLD: 6.54 10*3/MM3 (ref 3.4–10.8)

## 2023-04-27 PROCEDURE — 36415 COLL VENOUS BLD VENIPUNCTURE: CPT

## 2023-04-27 PROCEDURE — 85610 PROTHROMBIN TIME: CPT

## 2023-04-27 PROCEDURE — 82105 ALPHA-FETOPROTEIN SERUM: CPT

## 2023-04-27 PROCEDURE — 85025 COMPLETE CBC W/AUTO DIFF WBC: CPT

## 2023-05-11 ENCOUNTER — HOSPITAL ENCOUNTER (OUTPATIENT)
Dept: MRI IMAGING | Facility: HOSPITAL | Age: 73
Discharge: HOME | End: 2023-05-11
Admitting: NURSE PRACTITIONER
Payer: MEDICARE

## 2023-05-11 DIAGNOSIS — R10.11 RIGHT UPPER QUADRANT ABDOMINAL PAIN: ICD-10-CM

## 2023-05-11 DIAGNOSIS — R74.8 ELEVATED ALKALINE PHOSPHATASE LEVEL: ICD-10-CM

## 2023-05-11 DIAGNOSIS — K74.69 OTHER CIRRHOSIS OF LIVER: ICD-10-CM

## 2023-05-11 LAB
CREAT BLDA-MCNC: 0.6 MG/DL
EGFRCR SERPLBLD CKD-EPI 2021: 96.1 ML/MIN/1.73

## 2023-05-11 PROCEDURE — A9577 INJ MULTIHANCE: HCPCS | Performed by: NURSE PRACTITIONER

## 2023-05-11 PROCEDURE — 0 GADOBENATE DIMEGLUMINE 529 MG/ML SOLUTION: Performed by: NURSE PRACTITIONER

## 2023-05-11 PROCEDURE — 74183 MRI ABD W/O CNTR FLWD CNTR: CPT

## 2023-05-11 PROCEDURE — 82565 ASSAY OF CREATININE: CPT

## 2023-05-11 RX ADMIN — GADOBENATE DIMEGLUMINE 18 ML: 529 INJECTION, SOLUTION INTRAVENOUS at 09:02

## 2023-05-17 DIAGNOSIS — E11.65 TYPE 2 DIABETES MELLITUS WITH HYPERGLYCEMIA, WITHOUT LONG-TERM CURRENT USE OF INSULIN: ICD-10-CM

## 2023-05-18 RX ORDER — INSULIN DEGLUDEC INJECTION 100 U/ML
INJECTION, SOLUTION SUBCUTANEOUS
Qty: 45 ML | Refills: 0 | Status: SHIPPED | OUTPATIENT
Start: 2023-05-18

## 2023-06-06 ENCOUNTER — APPOINTMENT (OUTPATIENT)
Dept: GENERAL RADIOLOGY | Facility: HOSPITAL | Age: 73
End: 2023-06-06
Payer: MEDICARE

## 2023-06-06 ENCOUNTER — HOSPITAL ENCOUNTER (EMERGENCY)
Facility: HOSPITAL | Age: 73
Discharge: HOME | End: 2023-06-06
Attending: EMERGENCY MEDICINE | Admitting: EMERGENCY MEDICINE
Payer: MEDICARE

## 2023-06-06 VITALS
OXYGEN SATURATION: 95 % | HEART RATE: 60 BPM | DIASTOLIC BLOOD PRESSURE: 52 MMHG | HEIGHT: 61 IN | SYSTOLIC BLOOD PRESSURE: 98 MMHG | WEIGHT: 199.52 LBS | TEMPERATURE: 98.5 F | RESPIRATION RATE: 18 BRPM | BODY MASS INDEX: 37.67 KG/M2

## 2023-06-06 DIAGNOSIS — R10.13 EPIGASTRIC PAIN: Primary | ICD-10-CM

## 2023-06-06 LAB
ALBUMIN SERPL-MCNC: 4.1 G/DL (ref 3.5–5.2)
ALBUMIN/GLOB SERPL: 1.1 G/DL
ALP SERPL-CCNC: 194 U/L (ref 39–117)
ALT SERPL W P-5'-P-CCNC: 33 U/L (ref 1–33)
ANION GAP SERPL CALCULATED.3IONS-SCNC: 12 MMOL/L (ref 5–15)
AST SERPL-CCNC: 37 U/L (ref 1–32)
BASOPHILS # BLD AUTO: 0.06 10*3/MM3 (ref 0–0.2)
BASOPHILS NFR BLD AUTO: 0.5 % (ref 0–1.5)
BILIRUB SERPL-MCNC: 0.7 MG/DL (ref 0–1.2)
BUN SERPL-MCNC: 14 MG/DL (ref 8–23)
BUN/CREAT SERPL: 19.7 (ref 7–25)
CALCIUM SPEC-SCNC: 9.6 MG/DL (ref 8.6–10.5)
CHLORIDE SERPL-SCNC: 101 MMOL/L (ref 98–107)
CO2 SERPL-SCNC: 26 MMOL/L (ref 22–29)
CREAT SERPL-MCNC: 0.71 MG/DL (ref 0.57–1)
DEPRECATED RDW RBC AUTO: 44.2 FL (ref 37–54)
EGFRCR SERPLBLD CKD-EPI 2021: 91 ML/MIN/1.73
EOSINOPHIL # BLD AUTO: 0.12 10*3/MM3 (ref 0–0.4)
EOSINOPHIL NFR BLD AUTO: 1.1 % (ref 0.3–6.2)
ERYTHROCYTE [DISTWIDTH] IN BLOOD BY AUTOMATED COUNT: 13.7 % (ref 12.3–15.4)
GEN 5 2HR TROPONIN T REFLEX: 11 NG/L
GLOBULIN UR ELPH-MCNC: 3.9 GM/DL
GLUCOSE SERPL-MCNC: 204 MG/DL (ref 65–99)
HCT VFR BLD AUTO: 44.1 % (ref 34–46.6)
HGB BLD-MCNC: 15.1 G/DL (ref 12–15.9)
HOLD SPECIMEN: NORMAL
HOLD SPECIMEN: NORMAL
IMM GRANULOCYTES # BLD AUTO: 0.05 10*3/MM3 (ref 0–0.05)
IMM GRANULOCYTES NFR BLD AUTO: 0.4 % (ref 0–0.5)
LIPASE SERPL-CCNC: 30 U/L (ref 13–60)
LYMPHOCYTES # BLD AUTO: 2.08 10*3/MM3 (ref 0.7–3.1)
LYMPHOCYTES NFR BLD AUTO: 18.3 % (ref 19.6–45.3)
MAGNESIUM SERPL-MCNC: 2.1 MG/DL (ref 1.6–2.4)
MCH RBC QN AUTO: 30.3 PG (ref 26.6–33)
MCHC RBC AUTO-ENTMCNC: 34.2 G/DL (ref 31.5–35.7)
MCV RBC AUTO: 88.6 FL (ref 79–97)
MONOCYTES # BLD AUTO: 1.04 10*3/MM3 (ref 0.1–0.9)
MONOCYTES NFR BLD AUTO: 9.1 % (ref 5–12)
NEUTROPHILS NFR BLD AUTO: 70.6 % (ref 42.7–76)
NEUTROPHILS NFR BLD AUTO: 8.03 10*3/MM3 (ref 1.7–7)
NRBC BLD AUTO-RTO: 0 /100 WBC (ref 0–0.2)
NT-PROBNP SERPL-MCNC: 362.6 PG/ML (ref 0–900)
PLATELET # BLD AUTO: 136 10*3/MM3 (ref 140–450)
PMV BLD AUTO: 11.7 FL (ref 6–12)
POTASSIUM SERPL-SCNC: 4.3 MMOL/L (ref 3.5–5.2)
PROT SERPL-MCNC: 8 G/DL (ref 6–8.5)
QT INTERVAL: 365 MS
QT INTERVAL: 401 MS
RBC # BLD AUTO: 4.98 10*6/MM3 (ref 3.77–5.28)
SODIUM SERPL-SCNC: 139 MMOL/L (ref 136–145)
TROPONIN T DELTA: 0 NG/L
TROPONIN T SERPL HS-MCNC: 11 NG/L
WBC NRBC COR # BLD: 11.38 10*3/MM3 (ref 3.4–10.8)
WHOLE BLOOD HOLD COAG: NORMAL
WHOLE BLOOD HOLD SPECIMEN: NORMAL

## 2023-06-06 PROCEDURE — 36415 COLL VENOUS BLD VENIPUNCTURE: CPT

## 2023-06-06 PROCEDURE — 93010 ELECTROCARDIOGRAM REPORT: CPT | Performed by: INTERNAL MEDICINE

## 2023-06-06 PROCEDURE — 93005 ELECTROCARDIOGRAM TRACING: CPT

## 2023-06-06 PROCEDURE — 83735 ASSAY OF MAGNESIUM: CPT

## 2023-06-06 PROCEDURE — 84484 ASSAY OF TROPONIN QUANT: CPT

## 2023-06-06 PROCEDURE — 80053 COMPREHEN METABOLIC PANEL: CPT

## 2023-06-06 PROCEDURE — 93005 ELECTROCARDIOGRAM TRACING: CPT | Performed by: EMERGENCY MEDICINE

## 2023-06-06 PROCEDURE — 83880 ASSAY OF NATRIURETIC PEPTIDE: CPT

## 2023-06-06 PROCEDURE — 99284 EMERGENCY DEPT VISIT MOD MDM: CPT

## 2023-06-06 PROCEDURE — 71045 X-RAY EXAM CHEST 1 VIEW: CPT

## 2023-06-06 PROCEDURE — 83690 ASSAY OF LIPASE: CPT

## 2023-06-06 PROCEDURE — 85025 COMPLETE CBC W/AUTO DIFF WBC: CPT

## 2023-06-06 RX ORDER — ONDANSETRON 4 MG/1
4 TABLET, ORALLY DISINTEGRATING ORAL EVERY 6 HOURS PRN
Qty: 15 TABLET | Refills: 0 | Status: SHIPPED | OUTPATIENT
Start: 2023-06-06

## 2023-06-06 RX ORDER — SUCRALFATE 1 G/1
1 TABLET ORAL 4 TIMES DAILY
Qty: 40 TABLET | Refills: 0 | Status: SHIPPED | OUTPATIENT
Start: 2023-06-06

## 2023-06-06 RX ORDER — ASPIRIN 81 MG/1
324 TABLET, CHEWABLE ORAL ONCE
Status: DISCONTINUED | OUTPATIENT
Start: 2023-06-06 | End: 2023-06-06 | Stop reason: HOSPADM

## 2023-06-06 RX ORDER — SODIUM CHLORIDE 0.9 % (FLUSH) 0.9 %
10 SYRINGE (ML) INJECTION AS NEEDED
Status: DISCONTINUED | OUTPATIENT
Start: 2023-06-06 | End: 2023-06-06 | Stop reason: HOSPADM

## 2023-06-06 NOTE — ED PROVIDER NOTES
Time: 6:21 AM EDT  Date of encounter:  6/6/2023  Independent Historian/Clinical History and Information was obtained by:   Patient  Chief Complaint: Epigastric pain    History is limited by: N/A    History of Present Illness:  Patient is a 72 y.o. year old female who presents to the emergency department for evaluation of epigastric pain.  Patient states pain started last night.  She no longer has a gallbladder.  She is seeing gastroenterology and has an EGD scheduled.  She had a MRCP done May 11 which showed cirrhotic liver.  At time of evaluation the patient's nausea and pain have improved.    HPI    Patient Care Team  Primary Care Provider: Kelsey Hoyos APRN    Past Medical History:     No Known Allergies  Past Medical History:   Diagnosis Date    Cervical spinal stenosis 04/03/2019    Brisk reflexes suggestive of myelopathy    Cervicalgia     Diverticulitis of colon     Foot pain     Fusion of spine of cervical region 06/19/2019    GERD (gastroesophageal reflux disease)     Hyperlipidemia     Hypertension     Type 2 diabetes mellitus      Past Surgical History:   Procedure Laterality Date    BREAST BIOPSY      CERVICAL FUSION  03/14/2019    right C4-5,C5-6    CHOLECYSTECTOMY      COLONOSCOPY      KNEE SURGERY      TUBAL ABDOMINAL LIGATION       Family History   Problem Relation Age of Onset    Diabetes Mother         Unspecified    Breast cancer Sister     Brain cancer Sister     Colon cancer Sister 62    Heart disease Sister     Heart disease Brother     Diabetes Brother         Unspecified       Home Medications:  Prior to Admission medications    Medication Sig Start Date End Date Taking? Authorizing Provider   amLODIPine (NORVASC) 5 MG tablet Take 1 tablet by mouth Daily. 1/5/23   Kelsey Hoyos APRN   atorvastatin (LIPITOR) 10 MG tablet Take 1 tablet by mouth Daily. 1/5/23   Kelsey Hoyos APRN   benazepril (LOTENSIN) 40 MG tablet Take 1 tablet by mouth Daily. 1/5/23   Kelsey Hoyos  "CH[place] CASTILLO   DULoxetine (CYMBALTA) 60 MG capsule  12/28/22   ProviderGia MD   fluconazole (DIFLUCAN) 150 MG tablet TAKE 1 TABLET NOW, REPEAT IN 72 HOURS  Patient not taking: Reported on 4/13/2023 1/16/23   Kelsey Hoyos APRN   glipizide (GLUCOTROL) 10 MG tablet Take 1 tablet by mouth 2 (Two) Times a Day Before Meals. 1/5/23   Kelsey Hoyos APRN   HYDROcodone-acetaminophen (NORCO) 7.5-325 MG per tablet Take 1 tablet by mouth Every 12 (Twelve) Hours As Needed. 12/6/22   ProviderGia MD   Insulin Pen Needle (Droplet Pen Needles) 32G X 5 MM misc Inject 1 each under the skin into the appropriate area as directed Daily. 1/5/23   Kelsey Hoyos APRN   omeprazole (priLOSEC) 40 MG capsule Take 1 capsule by mouth Daily. 1/5/23   Kelsey Hoyos APRN   polyethylene glycol (GoLYTELY) 236 g solution Take per office instructions 4/13/23   Caridad Rios APRN   tiZANidine (ZANAFLEX) 4 MG tablet  12/28/22   Provider, MD Gia   Tresiba FlexTouch 100 UNIT/ML solution pen-injector injection INJECT 50 UNITS UNDER THE SKIN INTO THE APPROPRIATE AREA AS DIRECTED DAILY. 5/18/23   Kelsey Hoyos APRN        Social History:   Social History     Tobacco Use    Smoking status: Never    Smokeless tobacco: Never   Vaping Use    Vaping Use: Never used   Substance Use Topics    Alcohol use: Never    Drug use: No         Review of Systems:  Review of Systems   Respiratory: Negative.     Cardiovascular: Negative.    Gastrointestinal:  Positive for abdominal pain, nausea and vomiting.   All other systems reviewed and are negative.     Physical Exam:  /70   Pulse 82   Temp 98.5 °F (36.9 °C) (Oral)   Resp 22   Ht 154.9 cm (61\")   Wt 90.5 kg (199 lb 8.3 oz)   SpO2 93%   BMI 37.70 kg/m²     Physical Exam  Vitals and nursing note reviewed.   Constitutional:       Appearance: She is well-developed.   HENT:      Head: Normocephalic.   Cardiovascular:      Rate and Rhythm: Normal " rate and regular rhythm.      Heart sounds: Normal heart sounds.   Pulmonary:      Effort: Pulmonary effort is normal.      Breath sounds: Normal breath sounds.   Abdominal:      Palpations: Abdomen is soft.      Comments: Hyperactive bowel sounds   Musculoskeletal:         General: Normal range of motion.   Skin:     General: Skin is warm and dry.   Neurological:      General: No focal deficit present.      Mental Status: She is alert and oriented to person, place, and time.   Psychiatric:         Mood and Affect: Mood normal.                Procedures:  Procedures      Medical Decision Making:      Comorbidities that affect care:    Gerd, Diabetes, Hypertension    External Notes reviewed:    Previous Clinic Note: Seen 4/13/2023 by GI nurse practitioner in Dr. Delaney's office.  Patient is being seen for elevated hepatic enzyme and abdominal pain that has been going on since February.      The following orders were placed and all results were independently analyzed by me:  Orders Placed This Encounter   Procedures    XR Chest 1 View    [place] Draw    High Sensitivity Troponin T    Comprehensive Metabolic Panel    Lipase    BNP    Magnesium    CBC Auto Differential    High Sensitivity Troponin T 2Hr    NPO Diet NPO Type: Strict NPO    Undress & Gown    Continuous Pulse Oximetry    Oxygen Therapy- Nasal Cannula; Titrate 1-6 LPM Per SpO2; 90 - 95%    ECG 12 Lead ED Triage Standing Order; Chest Pain    ECG 12 Lead ED Triage Standing Order; Chest Pain    Insert Peripheral IV    CBC & Differential    Green Top (Gel)    Lavender Top    Gold Top - SST    Light Blue Top       Medications Given in the Emergency Department:  Medications   sodium chloride 0.9 % flush 10 mL (has no administration in time range)   aspirin chewable tablet 324 mg (0 mg Oral Hold 6/6/23 0255)        ED Course:    ED Course as of 06/06/23 0648   Tue Jun 06, 2023   0230 EKG:    Rhythm: Normal sinus rhythm  Rate: 94  Intervals: Normal  T-wave: Low  amplitude  ST Segment: Normal    EKG Comparison: Not available    Interpreted by me   [NL]   0416 EKG:    Rhythm: Normal sinus rhythm  Rate: 70  Intervals: Normal  T-wave: Low amplitude  ST Segment: Normal    EKG Comparison: Earlier today similar    Interpreted by me   [NL]      ED Course User Index  [NL] Miguel Angel Abel DO       Labs:    Lab Results (last 24 hours)       Procedure Component Value Units Date/Time    High Sensitivity Troponin T [[id number]]  (Abnormal) Collected: 06/06/23 0235    Specimen: Blood Updated: 06/06/23 0314     HS Troponin T 11 ng/L     Narrative:      High Sensitive Troponin T Reference Range:  <10.0 ng/L- Negative Female for AMI  <15.0 ng/L- Negative Male for AMI  >=10 - Abnormal Female indicating possible myocardial injury.  >=15 - Abnormal Male indicating possible myocardial injury.   Clinicians would have to utilize clinical acumen, EKG, Troponin, and serial changes to determine if it is an Acute Myocardial Infarction or myocardial injury due to an underlying chronic condition.         CBC & Differential [[id number]]  (Abnormal) Collected: 06/06/23 0235    Specimen: Blood Updated: 06/06/23 0246    Narrative:      The following orders were created for panel order CBC & Differential.  Procedure                               Abnormality         Status                     ---------                               -----------         ------                     CBC Auto Differential[[id number]]        Abnormal            Final result               Scan Slide[[id number]]                                                                    Please view results for these tests on the individual orders.    Comprehensive Metabolic Panel [[id number]]  (Abnormal) Collected: 06/06/23 0235    Specimen: Blood Updated: 06/06/23 0314     Glucose 204 mg/dL      BUN 14 mg/dL      Creatinine 0.71 mg/dL      Sodium 139 mmol/L      Potassium 4.3 mmol/L      Chloride 101 mmol/L      CO2 26.0 mmol/L      Calcium 9.6  mg/dL      Total Protein 8.0 g/dL      Albumin 4.1 g/dL      ALT (SGPT) 33 U/L      AST (SGOT) 37 U/L      Alkaline Phosphatase 194 U/L      Total Bilirubin 0.7 mg/dL      Globulin 3.9 gm/dL      A/G Ratio 1.1 g/dL      BUN/Creatinine Ratio 19.7     Anion Gap 12.0 mmol/L      eGFR 91.0 mL/min/1.73     Narrative:      GFR Normal >60  Chronic Kidney Disease <60  Kidney Failure <15    The GFR formula is only valid for adults with stable renal function between ages 18 and 70.    Lipase [[id number]]  (Normal) Collected: 06/06/23 0235    Specimen: Blood Updated: 06/06/23 0314     Lipase 30 U/L     BNP [[id number]]  (Normal) Collected: 06/06/23 0235    Specimen: Blood Updated: 06/06/23 0310     proBNP 362.6 pg/mL     Narrative:      Among patients with dyspnea, NT-proBNP is highly sensitive for the detection of acute congestive heart failure. In addition NT-proBNP of <300 pg/ml effectively rules out acute congestive heart failure with 99% negative predictive value.      Magnesium [[id number]]  (Normal) Collected: 06/06/23 0235    Specimen: Blood Updated: 06/06/23 0314     Magnesium 2.1 mg/dL     CBC Auto Differential [[id number]]  (Abnormal) Collected: 06/06/23 0235    Specimen: Blood Updated: 06/06/23 0246     WBC 11.38 10*3/mm3      RBC 4.98 10*6/mm3      Hemoglobin 15.1 g/dL      Hematocrit 44.1 %      MCV 88.6 fL      MCH 30.3 pg      MCHC 34.2 g/dL      RDW 13.7 %      RDW-SD 44.2 fl      MPV 11.7 fL      Platelets 136 10*3/mm3      Neutrophil % 70.6 %      Lymphocyte % 18.3 %      Monocyte % 9.1 %      Eosinophil % 1.1 %      Basophil % 0.5 %      Immature Grans % 0.4 %      Neutrophils, Absolute 8.03 10*3/mm3      Lymphocytes, Absolute 2.08 10*3/mm3      Monocytes, Absolute 1.04 10*3/mm3      Eosinophils, Absolute 0.12 10*3/mm3      Basophils, Absolute 0.06 10*3/mm3      Immature Grans, Absolute 0.05 10*3/mm3      nRBC 0.0 /100 WBC     High Sensitivity Troponin T 2Hr [[id number]]  (Abnormal) Collected: 06/06/23 0422     Specimen: Blood from Arm, Left Updated: 06/06/23 0448     HS Troponin T 11 ng/L      Troponin T Delta 0 ng/L     Narrative:      High Sensitive Troponin T Reference Range:  <10.0 ng/L- Negative Female for AMI  <15.0 ng/L- Negative Male for AMI  >=10 - Abnormal Female indicating possible myocardial injury.  >=15 - Abnormal Male indicating possible myocardial injury.   Clinicians would have to utilize clinical acumen, EKG, Troponin, and serial changes to determine if it is an Acute Myocardial Infarction or myocardial injury due to an underlying chronic condition.                  Imaging:    XR Chest 1 View    Result Date: 6/6/2023  PROCEDURE: XR CHEST 1 VW  COMPARISON: [facility], , CHEST PA/AP & LAT 2V, 11/25/2020, 8:38.  INDICATIONS: Chest Pain Triage Protocol  FINDINGS:  LUNGS: Normal.  No significant pulmonary parenchymal abnormalities.  VASCULATURE: Normal.  Unremarkable pulmonary vasculature.  CARDIAC: Normal.  No cardiac silhouette abnormality or cardiomegaly.  MEDIASTINUM: Normal.  No visible mass or adenopathy.  PLEURA: Normal.  No effusion or pleural thickening.  BONES: Cervical spinal hardware is noted. OTHER: Negative.        No acute cardiopulmonary process identified.       ANNE HURTADO MD       Electronically Signed and Approved By: ANNE HURTADO MD on 6/06/2023 at 2:56                Differential Diagnosis and Discussion:    Abdominal Pain: Based on the patient's signs and symptoms, I considered abdominal aortic aneurysm, small bowel obstruction, pancreatitis, acute cholecystitis, acute appendecitis, peptic ulcer disease, gastritis, colitis, endocrine disorders, irritable bowel syndrome and other differential diagnosis an etiology of the patient's abdominal pain.    All labs were reviewed and interpreted by me.  All X-rays impressions were independently interpreted by me.  EKG was interpreted by me.    MDM  72-year-old female patient presents with episode of epigastric  pain.  The patient reported the pain radiated to her back, sharp in nature, she was very nauseated and vomited several times.  I am of exam patient's symptoms had improved.  Her work-up did not show any significant acute abnormalities.  I discussed obtaining a CT evaluation of the abdomen/pelvis with the patient but she declined.  She is currently under the care of Caridad Rios, nurse practitioner at Dr. Delaney's gastroenterology office.  She states she has an EGD scheduled.  She had a recent MRCP done on May 11 which showed a cirrhotic liver.  Patient is stable for discharge with outpatient follow-up.        Patient Care Considerations:    CT ABDOMEN AND PELVIS: I considered ordering a CT scan of the abdomen and pelvis however patient declined      Consultants/Shared Management Plan:    None    Social Determinants of Health:    Patient is independent, reliable, and has access to care.       Disposition and Care Coordination:    Discharged: The patient is suitable and stable for discharge with no need for consideration of observation or admission.    I have explained discharge medications and the need for follow up with the patient/caretakers. This was also printed in the discharge instructions. Patient was discharged with the following medications and follow up:      Medication List      No changes were made to your prescriptions during this visit.      No follow-up provider specified.     Final diagnoses:   None        ED Disposition       None            This medical record created using voice recognition software.             Miguel Angel Abel DO  06/06/23 0619

## 2023-06-06 NOTE — ED NOTES
"Patient stated \"I want to use the restroom before you hook me up to the monitor I really have to go\" patient refused bed side commode and patient refused wheelchair. Walked with the patient to the restroom and back to the room and is currently on cardiac , 02 sensor and blood pressure cuff.    "

## 2023-06-06 NOTE — ED TRIAGE NOTES
"Patient arrived to ED with complaints of chest pain.      She states around 2230, she began vomiting at home.  Around 2300, her chest pain started and it \"hurts all the way around.\"     She denies sweating with pain.  Complains of having \"chills.\"     Patient admits worsening when she takes in a deep breath.    States she took two 81 mg aspirin before arrival.   "

## 2023-06-08 ENCOUNTER — TELEPHONE (OUTPATIENT)
Dept: GASTROENTEROLOGY | Facility: CLINIC | Age: 73
End: 2023-06-08
Payer: MEDICARE

## 2023-06-08 DIAGNOSIS — R77.8 ELEVATED TROPONIN: ICD-10-CM

## 2023-06-08 DIAGNOSIS — R10.13 EPIGASTRIC PAIN: Primary | ICD-10-CM

## 2023-06-08 NOTE — TELEPHONE ENCOUNTER
Please notify pt I rev'd ER rec's and noted + troponin, recommend urgent cardio appt. I will place order - I did not pick MD, please work in to first available  Thank you,  Caridad Rios, APRN

## 2023-06-13 ENCOUNTER — TELEPHONE (OUTPATIENT)
Dept: CASE MANAGEMENT | Facility: OTHER | Age: 73
End: 2023-06-13
Payer: MEDICARE

## 2023-06-13 NOTE — TELEPHONE ENCOUNTER
Patient identified on ER list as possible HRCM/CCM Program candidate. Attempted to contact patient, UTR, left message to return call.    ;Jaclyn Eden RN  Ambulatory Case Management  6/13/2023, 11:45 EDT

## 2023-06-19 NOTE — TELEPHONE ENCOUNTER
UTR x3, left message to return call.  Jaclyn Eden RN  Ambulatory Case Management    6/19/2023, 11:08 EDT

## 2023-07-24 ENCOUNTER — OFFICE VISIT (OUTPATIENT)
Dept: CARDIOLOGY | Facility: CLINIC | Age: 73
End: 2023-07-24
Payer: MEDICARE

## 2023-07-24 VITALS
HEART RATE: 67 BPM | BODY MASS INDEX: 37.76 KG/M2 | SYSTOLIC BLOOD PRESSURE: 159 MMHG | WEIGHT: 200 LBS | HEIGHT: 61 IN | DIASTOLIC BLOOD PRESSURE: 79 MMHG

## 2023-07-24 DIAGNOSIS — E78.2 MIXED DYSLIPIDEMIA: ICD-10-CM

## 2023-07-24 DIAGNOSIS — R94.31 ABNORMAL ELECTROCARDIOGRAM (ECG) (EKG): ICD-10-CM

## 2023-07-24 DIAGNOSIS — I10 ESSENTIAL HYPERTENSION: ICD-10-CM

## 2023-07-24 DIAGNOSIS — Z01.810 PRE-OPERATIVE CARDIOVASCULAR EXAMINATION: ICD-10-CM

## 2023-07-24 DIAGNOSIS — R07.89 CHEST PAIN, ATYPICAL: ICD-10-CM

## 2023-07-24 DIAGNOSIS — R10.13 EPIGASTRIC PAIN: Primary | ICD-10-CM

## 2023-07-24 PROCEDURE — 99204 OFFICE O/P NEW MOD 45 MIN: CPT | Performed by: INTERNAL MEDICINE

## 2023-07-24 PROCEDURE — 3078F DIAST BP <80 MM HG: CPT | Performed by: INTERNAL MEDICINE

## 2023-07-24 PROCEDURE — 3077F SYST BP >= 140 MM HG: CPT | Performed by: INTERNAL MEDICINE

## 2023-07-24 RX ORDER — METOPROLOL SUCCINATE 25 MG/1
25 TABLET, EXTENDED RELEASE ORAL DAILY
Qty: 90 TABLET | Refills: 3 | Status: SHIPPED | OUTPATIENT
Start: 2023-07-24

## 2023-07-24 NOTE — PROGRESS NOTES
Chief Complaint  Elevated Troponin      History of Present Illness  Melania Centeno presents to [facility]    Patient is here for cardiac evaluation.  She is a 72-year-old lady with past medical history as outlined below.  She has sporadic episodes of epigastric discomfort which feels like squeezing.  It is unrelated to physical activities.  It varies in duration.  She denies associated symptoms.  In addition, she complains of left-sided chest discomfort which is also sporadic and self-limited.  She has no other complaints today.  She has no dyspnea, palpitations, dizziness, presyncope or syncope.    Past Medical History:   Diagnosis Date    Cervical spinal stenosis 04/03/2019    Brisk reflexes suggestive of myelopathy    Cervicalgia     Diverticulitis of colon     Foot pain     Fusion of spine of cervical region 06/19/2019    GERD (gastroesophageal reflux disease)     Hyperlipidemia     Hypertension     Type 2 diabetes mellitus          Current Outpatient Medications:     amLODIPine (NORVASC) 5 MG tablet, Take 1 tablet by mouth Daily., Disp: 90 tablet, Rfl: 1    atorvastatin (LIPITOR) 10 MG tablet, TAKE 1 TABLET EVERY DAY, Disp: 90 tablet, Rfl: 1    benazepril (LOTENSIN) 40 MG tablet, Take 1 tablet by mouth Daily., Disp: 90 tablet, Rfl: 1    DULoxetine (CYMBALTA) 60 MG capsule, , Disp: , Rfl:     fluconazole (DIFLUCAN) 150 MG tablet, TAKE 1 TABLET NOW, REPEAT IN 72 HOURS, Disp: 2 tablet, Rfl: 0    glipizide (GLUCOTROL) 10 MG tablet, Take 1 tablet by mouth 2 (Two) Times a Day Before Meals., Disp: 180 tablet, Rfl: 1    glucose blood (FREESTYLE LITE) test strip, Use with glucose meter to check blood sugars twice a day (DX. E11.65)  Indications: Diabetes, Disp: 100 each, Rfl: 6    HYDROcodone-acetaminophen (NORCO) 7.5-325 MG per tablet, Take 1 tablet by mouth Every 12 (Twelve) Hours As Needed., Disp: , Rfl:     Insulin Pen Needle (Droplet Pen Needles) 32G X 5 MM misc, Inject 1 each under  "the skin into the appropriate area as directed Daily., Disp: 200 each, Rfl: 1    omeprazole (priLOSEC) 40 MG capsule, Take 1 capsule by mouth Daily., Disp: 90 capsule, Rfl: 1    ondansetron ODT (ZOFRAN-ODT) 4 MG disintegrating tablet, Place 1 tablet on the tongue Every 6 (Six) Hours As Needed for Nausea or Vomiting., Disp: 15 tablet, Rfl: 0    polyethylene glycol (GoLYTELY) 236 g solution, Take per office instructions, Disp: 4000 mL, Rfl: 0    sucralfate (CARAFATE) 1 g tablet, Take 1 tablet by mouth 4 (Four) Times a Day. Take 30 minutes prior to meals and bedtime, Disp: 40 tablet, Rfl: 0    tiZANidine (ZANAFLEX) 4 MG tablet, , Disp: , Rfl:     Tresiba FlexTouch 100 UNIT/ML solution pen-injector injection, INJECT 50 UNITS UNDER THE SKIN INTO THE APPROPRIATE AREA AS DIRECTED DAILY., Disp: 45 mL, Rfl: 0    metoprolol succinate XL (TOPROL-XL) 25 MG 24 hr tablet, Take 1 tablet by mouth Daily., Disp: 90 tablet, Rfl: 3    There are no discontinued medications.  No Known Allergies     Social History     Tobacco Use    Smoking status: Never    Smokeless tobacco: Never   Vaping Use    Vaping Use: Never used   Substance Use Topics    Alcohol use: Never    Drug use: No       Family History   Problem Relation Age of Onset    Diabetes Mother         Unspecified    Breast cancer Sister     Brain cancer Sister     Colon cancer Sister 62    Heart disease Sister     Heart disease Brother     Diabetes Brother         Unspecified        Objective     /79   Pulse 67   Ht 154.9 cm (60.98\")   Wt 90.7 kg (200 lb)   BMI 37.81 kg/m²       Physical Exam  Constitutional:       General: Awake. Not in acute distress.     Appearance: Normal appearance.   Neck:      Vascular: No carotid bruit, hepatojugular reflux or JVD.   Cardiovascular:      Rate and Rhythm: Normal rate and regular rhythm.      Chest Wall: PMI is not displaced.      Heart sounds: Normal heart sounds, S1 normal and S2 normal. No murmur heard.   No friction rub. No " gallop. No S3 or S4 sounds.    Pulmonary:      Effort: Pulmonary effort is normal.      Breath sounds: Normal breath sounds. No wheezing, rhonchi or rales.   Ext.      Right lower leg: No edema.      Left lower leg: No edema.   Skin:     General: Skin is warm and dry.      Coloration: Skin is not cyanotic.      Findings: No petechiae or rash.   Neurological:      Mental Status: Alert and oriented x 3  Psychiatric:         Behavior: Behavior is cooperative.       Result Review :     proBNP   Date Value Ref Range Status   06/06/2023 362.6 0.0 - 900.0 pg/mL Final     CMP          3/30/2023    13:02 5/11/2023    08:24 6/6/2023    02:35   CMP   Glucose 307   204    BUN 10   14    Creatinine 0.65  0.60  0.71    EGFR 94.3  96.1  91.0    Sodium 136   139    Potassium 4.2   4.3    Chloride 100   101    Calcium 9.1   9.6    Total Protein 7.5   8.0    Albumin 4.0   4.1    Globulin 3.5   3.9    Total Bilirubin 0.5   0.7    Alkaline Phosphatase 196   194    AST (SGOT) 35   37    ALT (SGPT) 31   33    Albumin/Globulin Ratio 1.1   1.1    BUN/Creatinine Ratio 15.4   19.7    Anion Gap 8.0   12.0      CBC w/diff          3/30/2023    13:02 4/27/2023    14:14 6/6/2023    02:35   CBC w/Diff   WBC 5.26  6.54  11.38    RBC 4.66  4.77  4.98    Hemoglobin 14.1  14.5  15.1    Hematocrit 41.9  43.1  44.1    MCV 89.9  90.4  88.6    MCH 30.3  30.4  30.3    MCHC 33.7  33.6  34.2    RDW 13.3  13.3  13.7    Platelets 111  129  136    Neutrophil Rel % 47.7  53.0  70.6    Immature Granulocyte Rel % 0.2  0.2  0.4    Lymphocyte Rel % 39.5  35.0  18.3    Monocyte Rel % 8.9  9.2  9.1    Eosinophil Rel % 2.9  2.1  1.1    Basophil Rel % 0.8  0.5  0.5       Lab Results   Component Value Date    TSH 2.160 03/30/2023      No results found for: FREET4   No results found for: DDIMERQUANT  Magnesium   Date Value Ref Range Status   06/06/2023 2.1 1.6 - 2.4 mg/dL Final      No results found for: DIGOXIN   Lab Results   Component Value Date    TROPONINT 11 (H)  06/06/2023      No results found for: POCTROP(       Lipid Panel          3/30/2023    13:02   Lipid Panel   Total Cholesterol 144    Triglycerides 141    HDL Cholesterol 63    VLDL Cholesterol 24    LDL Cholesterol  57    LDL/HDL Ratio 0.84                No results found for this or any previous visit.                Diagnoses and all orders for this visit:    1. Epigastric pain (Primary)  -     Adult Transthoracic Echo Complete W/ Cont if Necessary Per Protocol; Future  -     Stress Test With Myocardial Perfusion One Day; Future    2. Essential hypertension    3. Mixed dyslipidemia    4. Pre-operative cardiovascular examination    5. Abnormal electrocardiogram (ECG) (EKG)  -     Adult Transthoracic Echo Complete W/ Cont if Necessary Per Protocol; Future  -     Stress Test With Myocardial Perfusion One Day; Future    6. Chest pain, atypical    Other orders  -     metoprolol succinate XL (TOPROL-XL) 25 MG 24 hr tablet; Take 1 tablet by mouth Daily.  Dispense: 90 tablet; Refill: 3      Assessment:    Chest pain/epigastric discomfort: She has been having sporadic episodes of atypical chest discomfort as described in HPI.  Her exam is benign.  Recent ER evaluation was noted.  She had slight elevated high-sensitivity troponin with a delta of 0.  Her ECG showed sinus rhythm without ischemic ST-T changes.  She has multiple CAD risk factors.  She has an intermediate pretest probability for CAD.  She will be scheduled for Lexiscan stress test to assess for myocardial ischemia.  She cannot walk on the treadmill due to knee pain.  Echo will be done for LVEF, wall motion and valvular function.  She will be started on metoprolol XL 25 mg daily.  Continue atorvastatin and amlodipine.  Further recommendations to follow.    Preoperative cardiovascular examination: She is scheduled for upper and lower endoscopy within the next 10 days.  She has no active cardiac conditions.  She may proceed with these procedures at low risk for  perioperative cardiac complications.    Essential hypertension: Uncontrolled.  Metoprolol will be started as discussed above.  Continue other blood pressure medications and blood pressure monitoring.  We will reevaluate with her upcoming follow-up visit.    Mixed dyslipidemia: On atorvastatin.  Continue the same    Follow Up       Return for Return to clinic after diagnostic testing, With Marina ATKINSON.    Patient was given instructions and counseling regarding her condition or for health maintenance advice. Please see specific information pulled into the AVS if appropriate.

## 2023-07-27 ENCOUNTER — HOSPITAL ENCOUNTER (OUTPATIENT)
Dept: BONE DENSITY | Facility: HOSPITAL | Age: 73
Discharge: HOME | End: 2023-07-27
Admitting: NURSE PRACTITIONER
Payer: MEDICARE

## 2023-07-27 DIAGNOSIS — Z13.820 SCREENING FOR OSTEOPOROSIS: ICD-10-CM

## 2023-07-27 DIAGNOSIS — Z78.0 POST-MENOPAUSE: ICD-10-CM

## 2023-07-27 PROCEDURE — 77080 DXA BONE DENSITY AXIAL: CPT

## 2023-08-02 NOTE — PRE-PROCEDURE INSTRUCTIONS
"Instructed on date and arrival time of 0630. Come to entrance \"C\". Must have  over age 18 to drive home.  May have two visitors; however, children under 12 must stay in waiting room.  Discussed clear liquid diet (no red or purple) and bowel prep.  May take medications as usual except for blood thinners, diabetic medications, and weight loss medications.  Bring list of medications.  Verbalized understanding of instructions given.  Phone number given for questions or concerns.  Cardiac clearance noted in chart.  "

## 2023-08-03 ENCOUNTER — HOSPITAL ENCOUNTER (OUTPATIENT)
Facility: HOSPITAL | Age: 73
Setting detail: HOSPITAL OUTPATIENT SURGERY
Discharge: HOME | End: 2023-08-03
Attending: INTERNAL MEDICINE | Admitting: INTERNAL MEDICINE
Payer: MEDICARE

## 2023-08-03 ENCOUNTER — ANESTHESIA EVENT (OUTPATIENT)
Dept: GASTROENTEROLOGY | Facility: HOSPITAL | Age: 73
End: 2023-08-03
Payer: MEDICARE

## 2023-08-03 ENCOUNTER — ANESTHESIA (OUTPATIENT)
Dept: GASTROENTEROLOGY | Facility: HOSPITAL | Age: 73
End: 2023-08-03
Payer: MEDICARE

## 2023-08-03 VITALS
BODY MASS INDEX: 37.09 KG/M2 | OXYGEN SATURATION: 99 % | WEIGHT: 196.21 LBS | HEART RATE: 67 BPM | RESPIRATION RATE: 15 BRPM | SYSTOLIC BLOOD PRESSURE: 140 MMHG | TEMPERATURE: 98.7 F | DIASTOLIC BLOOD PRESSURE: 79 MMHG

## 2023-08-03 DIAGNOSIS — K74.69 OTHER CIRRHOSIS OF LIVER: ICD-10-CM

## 2023-08-03 DIAGNOSIS — R74.8 ELEVATED ALKALINE PHOSPHATASE LEVEL: ICD-10-CM

## 2023-08-03 DIAGNOSIS — R10.11 RIGHT UPPER QUADRANT ABDOMINAL PAIN: ICD-10-CM

## 2023-08-03 PROCEDURE — 88305 TISSUE EXAM BY PATHOLOGIST: CPT | Performed by: INTERNAL MEDICINE

## 2023-08-03 PROCEDURE — 25010000002 PROPOFOL 10 MG/ML EMULSION: Performed by: NURSE ANESTHETIST, CERTIFIED REGISTERED

## 2023-08-03 PROCEDURE — 45385 COLONOSCOPY W/LESION REMOVAL: CPT | Performed by: INTERNAL MEDICINE

## 2023-08-03 PROCEDURE — 43239 EGD BIOPSY SINGLE/MULTIPLE: CPT | Performed by: INTERNAL MEDICINE

## 2023-08-03 PROCEDURE — 82948 REAGENT STRIP/BLOOD GLUCOSE: CPT

## 2023-08-03 RX ORDER — SODIUM CHLORIDE, SODIUM LACTATE, POTASSIUM CHLORIDE, CALCIUM CHLORIDE 600; 310; 30; 20 MG/100ML; MG/100ML; MG/100ML; MG/100ML
30 INJECTION, SOLUTION INTRAVENOUS CONTINUOUS
Status: DISCONTINUED | OUTPATIENT
Start: 2023-08-03 | End: 2023-08-03 | Stop reason: HOSPADM

## 2023-08-03 RX ORDER — PROPOFOL 10 MG/ML
VIAL (ML) INTRAVENOUS AS NEEDED
Status: DISCONTINUED | OUTPATIENT
Start: 2023-08-03 | End: 2023-08-03 | Stop reason: SURG

## 2023-08-03 RX ORDER — LIDOCAINE HYDROCHLORIDE 20 MG/ML
INJECTION, SOLUTION EPIDURAL; INFILTRATION; INTRACAUDAL; PERINEURAL AS NEEDED
Status: DISCONTINUED | OUTPATIENT
Start: 2023-08-03 | End: 2023-08-03 | Stop reason: SURG

## 2023-08-03 RX ADMIN — PROPOFOL 200 MCG/KG/MIN: 10 INJECTION, EMULSION INTRAVENOUS at 07:59

## 2023-08-03 RX ADMIN — PROPOFOL 100 MG: 10 INJECTION, EMULSION INTRAVENOUS at 08:39

## 2023-08-03 RX ADMIN — SODIUM CHLORIDE, POTASSIUM CHLORIDE, SODIUM LACTATE AND CALCIUM CHLORIDE 30 ML/HR: 600; 310; 30; 20 INJECTION, SOLUTION INTRAVENOUS at 07:56

## 2023-08-03 RX ADMIN — PROPOFOL 50 MG: 10 INJECTION, EMULSION INTRAVENOUS at 08:42

## 2023-08-03 RX ADMIN — LIDOCAINE HYDROCHLORIDE 100 MG: 20 INJECTION, SOLUTION EPIDURAL; INFILTRATION; INTRACAUDAL; PERINEURAL at 07:59

## 2023-08-03 RX ADMIN — PROPOFOL 100 MG: 10 INJECTION, EMULSION INTRAVENOUS at 07:59

## 2023-08-03 NOTE — H&P
Pre Procedure History & Physical    Chief Complaint:   Cirrhosis abdominal pain and screening for colon cancer polyp, family history of colon cancer    Subjective     HPI:   Cirrhosis, abdominal pain, screening    Past Medical History:   Past Medical History:   Diagnosis Date    Cervical spinal stenosis 04/03/2019    Brisk reflexes suggestive of myelopathy    Cervicalgia     Diverticulitis of colon     Foot pain     Fusion of spine of cervical region 06/19/2019    GERD (gastroesophageal reflux disease)     Hyperlipidemia     Hypertension     Type 2 diabetes mellitus        Past Surgical History:  Past Surgical History:   Procedure Laterality Date    BREAST BIOPSY      CERVICAL FUSION  03/14/2019    right C4-5,C5-6    CHOLECYSTECTOMY      COLONOSCOPY      HEMORRHOIDECTOMY      KNEE SURGERY      TUBAL ABDOMINAL LIGATION         Family History:  Family History   Problem Relation Age of Onset    Diabetes Mother         Unspecified    Breast cancer Sister     Brain cancer Sister     Colon cancer Sister 62    Heart disease Sister     Heart disease Brother     Diabetes Brother         Unspecified       Social History:   reports that she has never smoked. She has never used smokeless tobacco. She reports that she does not drink alcohol and does not use drugs.    Medications:   Medications Prior to Admission   Medication Sig Dispense Refill Last Dose    amLODIPine (NORVASC) 5 MG tablet Take 1 tablet by mouth Daily. 90 tablet 1 8/3/2023    atorvastatin (LIPITOR) 10 MG tablet TAKE 1 TABLET EVERY DAY 90 tablet 1 8/2/2023    benazepril (LOTENSIN) 40 MG tablet Take 1 tablet by mouth Daily. 90 tablet 1 8/3/2023    DULoxetine (CYMBALTA) 60 MG capsule    8/2/2023    glipizide (GLUCOTROL) 10 MG tablet Take 1 tablet by mouth 2 (Two) Times a Day Before Meals. 180 tablet 1 8/2/2023    glucose blood (FREESTYLE LITE) test strip Use with glucose meter to check blood sugars twice a day (DX. E11.65)  Indications: Diabetes 100 each 6 8/3/2023     HYDROcodone-acetaminophen (NORCO) 7.5-325 MG per tablet Take 1 tablet by mouth Every 12 (Twelve) Hours As Needed.   8/2/2023    Insulin Pen Needle (Droplet Pen Needles) 32G X 5 MM misc Inject 1 each under the skin into the appropriate area as directed Daily. 200 each 1 8/3/2023    metoprolol succinate XL (TOPROL-XL) 25 MG 24 hr tablet Take 1 tablet by mouth Daily. 90 tablet 3 8/2/2023    omeprazole (priLOSEC) 40 MG capsule Take 1 capsule by mouth Daily. 90 capsule 1 8/2/2023    sucralfate (CARAFATE) 1 g tablet Take 1 tablet by mouth 4 (Four) Times a Day. Take 30 minutes prior to meals and bedtime 40 tablet 0 8/2/2023    tiZANidine (ZANAFLEX) 4 MG tablet    8/2/2023    Tresiba FlexTouch 100 UNIT/ML solution pen-injector injection INJECT 50 UNITS UNDER THE SKIN INTO THE APPROPRIATE AREA AS DIRECTED DAILY. 45 mL 0 8/2/2023    ondansetron ODT (ZOFRAN-ODT) 4 MG disintegrating tablet Place 1 tablet on the tongue Every 6 (Six) Hours As Needed for Nausea or Vomiting. 15 tablet 0 Unknown       Allergies:  Patient has no known allergies.        Objective     Blood pressure 179/57, pulse 72, temperature 98 øF (36.7 øC), temperature source Temporal, resp. rate 16, weight 89 kg (196 lb 3.4 oz), SpO2 97 %.    Physical Exam   Constitutional: Pt is oriented to person, place, and time and well-developed, well-nourished, and in no distress.   Mouth/Throat: Oropharynx is clear and moist.   Neck: Normal range of motion.   Cardiovascular: Normal rate, regular rhythm and normal heart sounds.    Pulmonary/Chest: Effort normal and breath sounds normal.   Abdominal: Soft. Nontender  Skin: Skin is warm and dry.   Psychiatric: Mood, memory, affect and judgment normal.     Assessment & Plan     Diagnosis:  Cirrhosis, abdominal pain, colon cancer screening due to a sister with colon cancer age 62    Anticipated Surgical Procedure:  EGD colonoscopy    The risks, benefits, and alternatives of this procedure have been discussed with the patient  or the responsible party- the patient understands and agrees to proceed.

## 2023-08-04 DIAGNOSIS — M81.0 OSTEOPOROSIS, UNSPECIFIED OSTEOPOROSIS TYPE, UNSPECIFIED PATHOLOGICAL FRACTURE PRESENCE: Primary | ICD-10-CM

## 2023-08-04 DIAGNOSIS — K21.9 GASTROESOPHAGEAL REFLUX DISEASE, UNSPECIFIED WHETHER ESOPHAGITIS PRESENT: ICD-10-CM

## 2023-08-04 LAB
CYTO UR: NORMAL
LAB AP CASE REPORT: NORMAL
LAB AP CLINICAL INFORMATION: NORMAL
PATH REPORT.FINAL DX SPEC: NORMAL
PATH REPORT.GROSS SPEC: NORMAL

## 2023-08-04 NOTE — TELEPHONE ENCOUNTER
Caller: Melania Centeno    Relationship: Self    Best call back number: [phone]     Requested Prescriptions:   Requested Prescriptions     Pending Prescriptions Disp Refills    omeprazole (priLOSEC) 40 MG capsule 90 capsule 1     Sig: Take 1 capsule by mouth Daily.        Pharmacy where request should be sent: [id number] - [place], [place] - [address] [facility] AT [facility] OF [address] & [place] - [phone] [facility] [phone] FX     Last office visit with prescribing clinician: 1/5/2023   Last telemedicine visit with prescribing clinician: Visit date not found   Next office visit with prescribing clinician: 8/17/2023     Additional details provided by patient: PATIENT MISSED APPOINTMENT ON 08.03.2023 DUE TO COLONOSCOPY RUNNING OVER. PATIENT RESCHEDULED APPOINTMENT BUT IS OUT OF OMEPRAZOLE MEDICATION. PATIENT WOULD LIKE REFILL CALLED IN TO [facility] IN [place].     Does the patient have less than a 3 day supply:  [x] Yes  [] No    Would you like a call back once the refill request has been completed: [] Yes [] No    If the office needs to give you a call back, can they leave a voicemail: [] Yes [] No    Piotr Bains Rep   08/04/23 11:16 EDT

## 2023-08-07 LAB — GLUCOSE BLDC GLUCOMTR-MCNC: 99 MG/DL (ref 70–99)

## 2023-08-07 RX ORDER — OMEPRAZOLE 40 MG/1
40 CAPSULE, DELAYED RELEASE ORAL DAILY
Qty: 90 CAPSULE | Refills: 1 | Status: SHIPPED | OUTPATIENT
Start: 2023-08-07

## 2023-08-10 ENCOUNTER — OFFICE VISIT (OUTPATIENT)
Dept: DIABETES SERVICES | Facility: HOSPITAL | Age: 73
End: 2023-08-10
Payer: MEDICARE

## 2023-08-10 ENCOUNTER — TELEPHONE (OUTPATIENT)
Dept: DIABETES SERVICES | Facility: HOSPITAL | Age: 73
End: 2023-08-10

## 2023-08-10 VITALS
OXYGEN SATURATION: 99 % | SYSTOLIC BLOOD PRESSURE: 110 MMHG | WEIGHT: 201.6 LBS | BODY MASS INDEX: 38.06 KG/M2 | HEIGHT: 61 IN | DIASTOLIC BLOOD PRESSURE: 70 MMHG | HEART RATE: 86 BPM

## 2023-08-10 DIAGNOSIS — E11.9 TYPE 2 DIABETES MELLITUS WITHOUT COMPLICATION, WITH LONG-TERM CURRENT USE OF INSULIN: Primary | ICD-10-CM

## 2023-08-10 DIAGNOSIS — Z79.4 TYPE 2 DIABETES MELLITUS WITHOUT COMPLICATION, WITH LONG-TERM CURRENT USE OF INSULIN: Primary | ICD-10-CM

## 2023-08-10 DIAGNOSIS — E66.9 OBESITY (BMI 30-39.9): ICD-10-CM

## 2023-08-10 LAB
EXPIRATION DATE: ABNORMAL
HBA1C MFR BLD: 9 %
Lab: ABNORMAL

## 2023-08-10 PROCEDURE — G0463 HOSPITAL OUTPT CLINIC VISIT: HCPCS | Performed by: NURSE PRACTITIONER

## 2023-08-10 RX ORDER — SEMAGLUTIDE 1.34 MG/ML
0.25 INJECTION, SOLUTION SUBCUTANEOUS WEEKLY
Qty: 1.5 ML | Refills: 1 | Status: SHIPPED | OUTPATIENT
Start: 2023-08-10 | End: 2023-08-10 | Stop reason: ALTCHOICE

## 2023-08-10 RX ORDER — BLOOD-GLUCOSE SENSOR
1 EACH MISCELLANEOUS
Qty: 2 EACH | Refills: 5 | Status: SHIPPED | OUTPATIENT
Start: 2023-08-10

## 2023-08-10 RX ORDER — DULAGLUTIDE 0.75 MG/.5ML
0.75 INJECTION, SOLUTION SUBCUTANEOUS
Qty: 2 ML | Refills: 5 | Status: SHIPPED | OUTPATIENT
Start: 2023-08-10 | End: 2023-09-01

## 2023-08-10 RX ORDER — ONDANSETRON 4 MG/1
4 TABLET, FILM COATED ORAL EVERY 8 HOURS PRN
Qty: 30 TABLET | Refills: 0 | Status: SHIPPED | OUTPATIENT
Start: 2023-08-10

## 2023-08-10 NOTE — PATIENT INSTRUCTIONS
Increase your Tresiba to 53 units once daily.  I want you to increase this by 3 units every 3 days until your fasting blood sugar is less than 150.  Once your fasting blood sugars less than 150 can remain on this dose.  For example tonight you will increase your Tresiba to 53 units once daily.  If in 3 days your fasting blood sugar is remaining above 150 increase to 56 units continue this process every 3 days until the fasting blood sugars less than 150.    Start Ozempic 0.25 mg once weekly.  Make sure to do this injection on the same day of the week.  A prescription for Zofran has been sent in in case you have nausea from this medication.    A freestyle libra continuous glucose monitor has been sent to your pharmacy.  Please call the office when you receive this device to set up scheduling for our diabetic nurse educator.    A referral has been made to the dietitian as well.

## 2023-08-10 NOTE — PROGRESS NOTES
"Chief Complaint  Diabetes    Referred By: CHINA Saldana Taryn presents to [facility] for diabetes medication management    History of Present Illness    Visit type:  to establish care  Diabetes type:  Type 2  Age at time of dx/Year of dx/Number of years: Reports she was diagnosed with type 2 diabetes about 7 or 8 years ago.  Family History of Diabetes: Mother and brother  Current diabetes status/concerns/issues: She was referred to our clinic by her primary care provider for management of her diabetes.  She is currently taking glipizide 10 mg twice daily and Tresiba 50 units once daily for her diabetes.  She reports her glucose levels are running around 250.  Other current health concerns: states she has pain in b/l legs for several years. She sees [facility] for pain management.  States she has tried and failed gabapentin in the past.  She is taking hydrocodone for pain management.  Current Diabetes symptoms:    Polyuria: Yes     Polydipsia: Yes     Polyphagia: No   Blurred vision: No   Excessive fatigue: Yes    Known Diabetes complications:  Neuropathy: None; Location: N/A  Renal: None  Eyes: None; Location: N/A; Last Eye Exam: None; Location: N/A  Amputation/Wounds: None  GI: Indigestion  Cardiovascular: Hypertension and Hyperlipidemia  ED: N/A  Other: None  Hospitalizations/ED/911 secondary to DM?  No  Hypoglycemia:  None reported at this time  Hypoglycemia Symptoms:  No hypoglycemia at this time  Current Diabetes treatment:  glipizide 10mg bid, Tresiba 50 units qd  Prior diabetes treatments:  Lantus, jardiance, metformin  Using ACEI or ARB: Yes, Benazepril 40mg qd, Managed by other provider  Using Statin: Yes, Lipitor 10mg qd, Managed by other provider  Blood glucose device:  Meter  Blood glucose monitoring frequency:  2  Blood glucose range/average:   average 250  Glucose Source: Patient Reported  Dietary behavior:  \"Eat what I " "want\"/No diet plan, Number of meals each day - 2; Number of snacks each day - 0  Activity/Exercise:   Qubii-states she sits in her chair and peddles at least 20min daily  Last Foot Exam:  2021- podiatry   Diabetes Education Hx: None  Social Determinants of Health: None    Past Medical History:   Diagnosis Date    Cervical spinal stenosis 04/03/2019    Brisk reflexes suggestive of myelopathy    Cervicalgia     Diverticulitis of colon     Foot pain     Fusion of spine of cervical region 06/19/2019    GERD (gastroesophageal reflux disease)     Hyperlipidemia     Hypertension     Type 2 diabetes mellitus      Past Surgical History:   Procedure Laterality Date    BREAST BIOPSY      CERVICAL FUSION  03/14/2019    right C4-5,C5-6    CHOLECYSTECTOMY      COLONOSCOPY      COLONOSCOPY N/A 8/3/2023    Procedure: COLONOSCOPY WITH POLYPECTOMIES;  Surgeon: Neal Delaney MD;  Location: [facility] ENDOSCOPY;  Service: Gastroenterology;  Laterality: N/A;  COLON POLYPS, DIVERTICULOSIS    ENDOSCOPY N/A 8/3/2023    Procedure: ESOPHAGOGASTRODUODENOSCOPY WITH BIOPSIES;  Surgeon: Neal Delaney MD;  Location: [facility] ENDOSCOPY;  Service: Gastroenterology;  Laterality: N/A;  HIATAL HERNIA, REFLUX ESOPHAGITIS    HEMORRHOIDECTOMY      KNEE SURGERY      TUBAL ABDOMINAL LIGATION       Family History   Problem Relation Age of Onset    Diabetes Mother         Unspecified    Breast cancer Sister     Brain cancer Sister     Colon cancer Sister 62    Heart disease Sister     Heart disease Brother     Diabetes Brother         Unspecified     Social History     Socioeconomic History    Marital status:    Tobacco Use    Smoking status: Never    Smokeless tobacco: Never   Vaping Use    Vaping Use: Never used   Substance and Sexual Activity    Alcohol use: Never    Drug use: No    Sexual activity: Defer     No Known Allergies    Current Outpatient Medications:     amLODIPine (NORVASC) 5 MG tablet, Take 1 tablet by mouth Daily., " "Disp: 90 tablet, Rfl: 1    atorvastatin (LIPITOR) 10 MG tablet, TAKE 1 TABLET EVERY DAY, Disp: 90 tablet, Rfl: 1    benazepril (LOTENSIN) 40 MG tablet, Take 1 tablet by mouth Daily., Disp: 90 tablet, Rfl: 1    DULoxetine (CYMBALTA) 60 MG capsule, , Disp: , Rfl:     glipizide (GLUCOTROL) 10 MG tablet, Take 1 tablet by mouth 2 (Two) Times a Day Before Meals., Disp: 180 tablet, Rfl: 1    glucose blood (FREESTYLE LITE) test strip, Use with glucose meter to check blood sugars twice a day (DX. E11.65)  Indications: Diabetes, Disp: 100 each, Rfl: 6    HYDROcodone-acetaminophen (NORCO) 7.5-325 MG per tablet, Take 1 tablet by mouth Every 12 (Twelve) Hours As Needed., Disp: , Rfl:     Insulin Pen Needle (Droplet Pen Needles) 32G X 5 MM misc, Inject 1 each under the skin into the appropriate area as directed Daily., Disp: 200 each, Rfl: 1    metoprolol succinate XL (TOPROL-XL) 25 MG 24 hr tablet, Take 1 tablet by mouth Daily., Disp: 90 tablet, Rfl: 3    omeprazole (priLOSEC) 40 MG capsule, Take 1 capsule by mouth Daily., Disp: 90 capsule, Rfl: 1    tiZANidine (ZANAFLEX) 4 MG tablet, , Disp: , Rfl:     Tresiba FlexTouch 100 UNIT/ML solution pen-injector injection, INJECT 50 UNITS UNDER THE SKIN INTO THE APPROPRIATE AREA AS DIRECTED DAILY., Disp: 45 mL, Rfl: 0    Continuous Blood Gluc Sensor (FreeStyle Renae 3 Sensor) misc, 1 each Every 14 (Fourteen) Days., Disp: 2 each, Rfl: 5    ondansetron (Zofran) 4 MG tablet, Take 1 tablet by mouth Every 8 (Eight) Hours As Needed for Nausea or Vomiting., Disp: 30 tablet, Rfl: 0    Semaglutide,0.25 or 0.5MG/DOS, (Ozempic, 0.25 or 0.5 MG/DOSE,) 2 MG/1.5ML solution pen-injector, Inject 0.25 mg under the skin into the appropriate area as directed 1 (One) Time Per Week for 90 days., Disp: 1.5 mL, Rfl: 1    Objective     Vitals:    08/10/23 1105   BP: 110/70   Pulse: 86   SpO2: 99%   Weight: 91.4 kg (201 lb 9.6 oz)   Height: 154.9 cm (60.98\")     Body mass index is 38.11 kg/mý.    Physical " Exam  Constitutional:       Appearance: Normal appearance. She is normal weight. She is obese.      Comments: Obesity (BMI 30 - 39.9) Pt Current BMI = 38.11      HENT:      Head: Normocephalic and atraumatic.      Right Ear: External ear normal.      Left Ear: External ear normal.      Nose: Nose normal.   Eyes:      Extraocular Movements: Extraocular movements intact.      Conjunctiva/sclera: Conjunctivae normal.   Pulmonary:      Effort: Pulmonary effort is normal.   Musculoskeletal:         General: Normal range of motion.      Cervical back: Normal range of motion.   Skin:     General: Skin is warm and dry.   Neurological:      General: No focal deficit present.      Mental Status: She is alert and oriented to person, place, and time. Mental status is at baseline.   Psychiatric:         Mood and Affect: Mood normal.         Behavior: Behavior normal.         Thought Content: Thought content normal.         Judgment: Judgment normal.           Result Review :   The following data was reviewed by: CHINA Bang on 08/10/2023:    Most Recent A1C          8/10/2023    11:16   HGBA1C Most Recent   Hemoglobin A1C 9.0        A1C Last 3 Results          3/30/2023    13:02 8/10/2023    11:16   HGBA1C Last 3 Results   Hemoglobin A1C 10.00  9.0      A1c collected 8/10/2023 is 9%, indicating Uncontrolled Type II diabetes.  This result is down from the prior result of 10% collected on 3/30/2023      Creatinine   Date Value Ref Range Status   06/06/2023 0.71 0.57 - 1.00 mg/dL Final   05/11/2023 0.60 mg/dL Final     Comment:     Serial Number: [id number]:  [id number]   03/30/2023 0.65 0.57 - 1.00 mg/dL Final     eGFR   Date Value Ref Range Status   06/06/2023 91.0 >60.0 mL/min/1.73 Final   05/11/2023 96.1 >60.0 mL/min/1.73 Final     Labs collected on 6/6/2023 show Normal values    Microalbumin, Urine   Date Value Ref Range Status   03/30/2023 1.8 mg/dL Final     Creatinine, Urine   Date Value Ref Range Status    03/30/2023 123.7 mg/dL Final     Microalbumin/Creatinine Ratio   Date Value Ref Range Status   03/30/2023 14.6 mg/g Final   05/17/2021 26.2 0.0 - 35.0 mg/g[Cre] Final   02/04/2021 29.8 0.0 - 35.0 mg/g[Cre] Final     Urine microalbuminuria collected on 3/30/2023 is negative for microalbuminuria    Total Cholesterol   Date Value Ref Range Status   03/30/2023 144 0 - 200 mg/dL Final   11/08/2021 143 0 - 200 mg/dL Final     Triglycerides   Date Value Ref Range Status   03/30/2023 141 0 - 150 mg/dL Final   11/08/2021 140 0 - 150 mg/dL Final     HDL Cholesterol   Date Value Ref Range Status   03/30/2023 63 (H) 40 - 60 mg/dL Final   11/08/2021 59 40 - 60 mg/dL Final     LDL Cholesterol    Date Value Ref Range Status   03/30/2023 57 0 - 100 mg/dL Final   11/08/2021 60 0 - 100 mg/dL Final     Lipid panel collected on 3/30/2023 shows normal lipid panel            Assessment: Patient was referred to our clinic by her primary care provider for management of her diabetes.  She reports her glucoses averaging around 250 mg/dL.She is currently taking glipizide 10 mg twice daily and Tresiba 50 units once daily for her diabetes.  Her A1c in the office today is 9% which is down from her previous A1c of 10% in March.  She denies ever seeing a dietitian or having comprehensive diabetes education in the past.  She does report that she does not follow a diabetic diet as she eats what ever she likes.  She does avoid sugary drinks stating she drinks mainly Diet Coke or water.  She states she does chair exercises about 20 minutes daily.      Diagnoses and all orders for this visit:    1. Type 2 diabetes mellitus without complication, with long-term current use of insulin (Primary)  -     POC Glycosylated Hemoglobin (Hb A1C)  -     Continuous Blood Gluc Sensor (FreeStyle Renae 3 Sensor) misc; 1 each Every 14 (Fourteen) Days.  Dispense: 2 each; Refill: 5  -     Semaglutide,0.25 or 0.5MG/DOS, (Ozempic, 0.25 or 0.5 MG/DOSE,) 2 MG/1.5ML  solution pen-injector; Inject 0.25 mg under the skin into the appropriate area as directed 1 (One) Time Per Week for 90 days.  Dispense: 1.5 mL; Refill: 1  -     Ambulatory Referral to [facility] - [place]  -     ondansetron (Zofran) 4 MG tablet; Take 1 tablet by mouth Every 8 (Eight) Hours As Needed for Nausea or Vomiting.  Dispense: 30 tablet; Refill: 0    2. Obesity (BMI 30-39.9)        Plan: Increased Tresiba to 53 units once daily.  Instructed patient to increase by 3 units every 3 days until her fasting blood sugars less than 150.  Once her fasting blood sugars are consistently less than 150 she can remain on that dose.  Prescribed Ozempic 0.25 mg once weekly.  Instructed patient to make sure to do this injection on the same day of the week.  She denies any personal or family history of pancreatic or thyroid cancer or pancreatitis.  A prescription for Zofran was sent for as needed nausea.  A freestyle libra CGM was ordered for closer monitoring of her glucose levels.  She was instructed to call the office to schedule an appoint with the diabetic nurse educator for teaching when she receives this device.  A referral was made for the dietitian as well for carb counting and low-carb low sugar diet.    The patient will monitor her blood glucose levels per CGM.  If she develops problematic hyperglycemia or hypoglycemia or adverse drug reactions, she will contact the office for further instructions.        Follow Up     Return in about 4 weeks (around 9/7/2023) for Medication Mgmt, CGM Follow Up, DSME, DMNT.    Patient was given instructions and counseling regarding her condition or for health maintenance advice. Please see specific information pulled into the AVS if appropriate.     Bonnie Awad, CHINA  08/10/2023      Dictated Utilizing Dragon Dictation.  Please note that portions of this note were completed with a voice recognition program.  Part of this note may be an electronic  transcription/translation of spoken language to printed text using the Dragon Dictation System.

## 2023-08-10 NOTE — TELEPHONE ENCOUNTER
Patient called in and stated that she can't afford the ozempic Patient stated that it will cost $450 tand is requested a different prescription to be sent. Patient stated that the libra is affordable and she will pick this up.

## 2023-08-14 ENCOUNTER — TELEPHONE (OUTPATIENT)
Dept: GASTROENTEROLOGY | Facility: CLINIC | Age: 73
End: 2023-08-14
Payer: MEDICARE

## 2023-08-14 NOTE — TELEPHONE ENCOUNTER
Attempted to contact patient, left voicemail message to return call.    Follow up appointment with CHINA Miller is scheduled on 8/17/23 at 1315.

## 2023-08-14 NOTE — TELEPHONE ENCOUNTER
----- Message from CHINA Stewart sent at 8/11/2023  2:07 PM EDT -----  EGD 8/3/2023: Medium size hiatal hernia, grade B esophagitis-biopsy with chronic inflammation otherwise negative, normal stomach and normal duodenum, Protonix prescribed    Colonoscopy 8/3/2023: Inadequate prep, 2 small polyps removed from the ascending colon, 3 small polyps removed from the sigmoid and descending colon--, diverticulosis noted, repeat colonoscopy in 4 months.      Follow-up appointment

## 2023-08-17 ENCOUNTER — OFFICE VISIT (OUTPATIENT)
Dept: FAMILY MEDICINE CLINIC | Facility: CLINIC | Age: 73
End: 2023-08-17
Payer: MEDICARE

## 2023-08-17 VITALS
HEART RATE: 62 BPM | WEIGHT: 197 LBS | OXYGEN SATURATION: 97 % | DIASTOLIC BLOOD PRESSURE: 83 MMHG | BODY MASS INDEX: 37.19 KG/M2 | HEIGHT: 61 IN | SYSTOLIC BLOOD PRESSURE: 176 MMHG

## 2023-08-17 DIAGNOSIS — M81.0 OSTEOPOROSIS, UNSPECIFIED OSTEOPOROSIS TYPE, UNSPECIFIED PATHOLOGICAL FRACTURE PRESENCE: ICD-10-CM

## 2023-08-17 DIAGNOSIS — E11.65 TYPE 2 DIABETES MELLITUS WITH HYPERGLYCEMIA, WITHOUT LONG-TERM CURRENT USE OF INSULIN: ICD-10-CM

## 2023-08-17 DIAGNOSIS — E78.2 MIXED HYPERLIPIDEMIA: ICD-10-CM

## 2023-08-17 DIAGNOSIS — Z00.00 MEDICARE ANNUAL WELLNESS VISIT, SUBSEQUENT: Primary | ICD-10-CM

## 2023-08-17 DIAGNOSIS — I10 ESSENTIAL HYPERTENSION: ICD-10-CM

## 2023-08-17 RX ORDER — AMLODIPINE BESYLATE 5 MG/1
5 TABLET ORAL DAILY
Qty: 90 TABLET | Refills: 1 | Status: SHIPPED | OUTPATIENT
Start: 2023-08-17 | End: 2023-08-17 | Stop reason: SDUPTHER

## 2023-08-17 RX ORDER — INSULIN DEGLUDEC INJECTION 100 U/ML
60 INJECTION, SOLUTION SUBCUTANEOUS DAILY
Qty: 45 ML | Refills: 0 | Status: SHIPPED | OUTPATIENT
Start: 2023-08-17

## 2023-08-17 RX ORDER — ATORVASTATIN CALCIUM 10 MG/1
10 TABLET, FILM COATED ORAL DAILY
Qty: 90 TABLET | Refills: 1 | Status: SHIPPED | OUTPATIENT
Start: 2023-08-17

## 2023-08-17 RX ORDER — BENAZEPRIL HYDROCHLORIDE 40 MG/1
40 TABLET, FILM COATED ORAL DAILY
Qty: 90 TABLET | Refills: 1 | Status: SHIPPED | OUTPATIENT
Start: 2023-08-17

## 2023-08-17 RX ORDER — AMLODIPINE BESYLATE 5 MG/1
TABLET ORAL
Qty: 135 TABLET | Refills: 1 | Status: SHIPPED | OUTPATIENT
Start: 2023-08-17

## 2023-08-17 NOTE — PROGRESS NOTES
The ABCs of the Annual Wellness Visit  Subsequent Medicare Wellness Visit    Subjective    Mealnia Centeno is a 73 y.o. female who presents for a Subsequent Medicare Wellness Visit.    The following portions of the patient's history were reviewed and   updated as appropriate: allergies, current medications, past family history, past medical history, past social history, past surgical history, and problem list.    Compared to one year ago, the patient feels her physical   health is the same.    Compared to one year ago, the patient feels her mental   health is the same.    Recent Hospitalizations:  She was not admitted to the hospital during the last year.       Current Medical Providers:  Patient Care Team:  Kelsey Hoyos APRN as PCP - General (Nurse Practitioner)  Caridad Rios APRN as Nurse Practitioner (Gastroenterology)  Jeramy Casey DPM as Consulting Physician (Podiatry)  Nubia Hernandez APRN as Consulting Physician (Pain Medicine)  Jaclyn Eden, RUPA as Ambulatory  (Population Health)  Roly Torres MD as Consulting Physician (Cardiology)  Bonnie Awad APRN as Nurse Practitioner (Nurse Practitioner)    Outpatient Medications Prior to Visit   Medication Sig Dispense Refill    Continuous Blood Gluc Sensor (FreeStyle Renae 3 Sensor) misc 1 each Every 14 (Fourteen) Days. 2 each 5    Dulaglutide (Trulicity) 0.75 MG/0.5ML solution pen-injector Inject 0.75 mg under the skin into the appropriate area as directed Every 7 (Seven) Days for 4 doses. 2 mL 5    DULoxetine (CYMBALTA) 60 MG capsule       glipizide (GLUCOTROL) 10 MG tablet Take 1 tablet by mouth 2 (Two) Times a Day Before Meals. 180 tablet 1    glucose blood (FREESTYLE LITE) test strip Use with glucose meter to check blood sugars twice a day (DX. E11.65)  Indications: Diabetes 100 each 6    HYDROcodone-acetaminophen (NORCO) 7.5-325 MG per tablet Take 1 tablet by mouth Every 12 (Twelve) Hours As  Needed.      Insulin Pen Needle (Droplet Pen Needles) 32G X 5 MM misc Inject 1 each under the skin into the appropriate area as directed Daily. 200 each 1    metoprolol succinate XL (TOPROL-XL) 25 MG 24 hr tablet Take 1 tablet by mouth Daily. 90 tablet 3    omeprazole (priLOSEC) 40 MG capsule Take 1 capsule by mouth Daily. 90 capsule 1    ondansetron (Zofran) 4 MG tablet Take 1 tablet by mouth Every 8 (Eight) Hours As Needed for Nausea or Vomiting. 30 tablet 0    tiZANidine (ZANAFLEX) 4 MG tablet       amLODIPine (NORVASC) 5 MG tablet Take 1 tablet by mouth Daily. 90 tablet 1    atorvastatin (LIPITOR) 10 MG tablet TAKE 1 TABLET EVERY DAY 90 tablet 1    benazepril (LOTENSIN) 40 MG tablet Take 1 tablet by mouth Daily. 90 tablet 1    Tresiba FlexTouch 100 UNIT/ML solution pen-injector injection INJECT 50 UNITS UNDER THE SKIN INTO THE APPROPRIATE AREA AS DIRECTED DAILY. 45 mL 0     No facility-administered medications prior to visit.       Opioid medication/s are on active medication list.  and I have evaluated her active treatment plan and pain score trends (see table).  There were no vitals filed for this visit.  I have reviewed the chart for potential of high risk medication and harmful drug interactions in the elderly.          Aspirin is not on active medication list.  Aspirin use is not indicated based on review of current medical condition/s. Risk of harm outweighs potential benefits.  .    Patient Active Problem List   Diagnosis    Pelvic organ prolapse quantification stage 3 cystocele    Pelvic organ prolapse quantification stage 1 rectocele    Uterine prolapse    Urge incontinence    Atrophic vaginitis    Left hip pain    Gastroesophageal reflux disease    Type 2 diabetes mellitus with hyperglycemia, without long-term current use of insulin    Essential hypertension    Mixed hyperlipidemia    Cervical spinal stenosis    Fusion of spine of cervical region    Degeneration of lumbar intervertebral disc     "Encounter for long-term (current) use of insulin    Lumbar spondylosis    Neck pain    Other cirrhosis of liver    Elevated alkaline phosphatase level    Right upper quadrant abdominal pain    Osteoporosis     Advance Care Planning  Advance Directive is not on file.  ACP discussion was held with the patient during this visit. Patient does not have an advance directive, declines further assistance.     Objective    Vitals:    23 1147   BP: 176/83   Pulse: 62   SpO2: 97%   Weight: 89.4 kg (197 lb)   Height: 154.9 cm (60.98\")     Estimated body mass index is 37.25 kg/mý as calculated from the following:    Height as of this encounter: 154.9 cm (60.98\").    Weight as of this encounter: 89.4 kg (197 lb).           Does the patient have evidence of cognitive impairment? No    Lab Results   Component Value Date    HGBA1C 9.0 08/10/2023        HEALTH RISK ASSESSMENT    Smoking Status:  Social History     Tobacco Use   Smoking Status Never   Smokeless Tobacco Never     Alcohol Consumption:  Social History     Substance and Sexual Activity   Alcohol Use Never     Fall Risk Screen:    STEADI Fall Risk Assessment was completed, and patient is at LOW risk for falls.Assessment completed on:2023    Depression Screenin/17/2023    11:00 AM   PHQ-2/PHQ-9 Depression Screening   Little Interest or Pleasure in Doing Things 0-->not at all   Feeling Down, Depressed or Hopeless 0-->not at all   PHQ-9: Brief Depression Severity Measure Score 0       Health Habits and Functional and Cognitive Screenin/17/2023    11:00 AM   Functional & Cognitive Status   Do you have difficulty preparing food and eating? No   Do you have difficulty bathing yourself, getting dressed or grooming yourself? No   Do you have difficulty using the toilet? No   Do you have difficulty moving around from place to place? No   Do you have trouble with steps or getting out of a bed or a chair? No   Current Diet Limited Junk Food   Dental Exam " Not up to date   Eye Exam Not up to date   Exercise (times per week) 7 times per week   Current Exercises Include Stationary Bicycling/Spin Class   Do you need help using the phone?  No   Are you deaf or do you have serious difficulty hearing?  No   Do you need help to go to places out of walking distance? No   Do you need help shopping? No   Do you need help preparing meals?  No   Do you need help with housework?  No   Do you need help with laundry? No   Do you need help taking your medications? No   Do you need help managing money? No   Do you ever drive or ride in a car without wearing a seat belt? No   Have you felt unusual stress, anger or loneliness in the last month? No   Who do you live with? Spouse   If you need help, do you have trouble finding someone available to you? No   Have you been bothered in the last four weeks by sexual problems? No   Do you have difficulty concentrating, remembering or making decisions? No       Age-appropriate Screening Schedule:  Refer to the list below for future screening recommendations based on patient's age, sex and/or medical conditions. Orders for these recommended tests are listed in the plan section. The patient has been provided with a written plan.    Health Maintenance   Topic Date Due    Hepatitis B (1 of 3 - Risk 3-dose series) Never done    DIABETIC FOOT EXAM  06/16/2022    ANNUAL WELLNESS VISIT  08/05/2022    MAMMOGRAM  08/13/2023    Pneumococcal Vaccine 65+ (1 - PCV) 01/05/2024 (Originally 7/28/1956)    TDAP/TD VACCINES (1 - Tdap) 01/05/2024 (Originally 7/28/1970)    ZOSTER VACCINE (1 of 2) 01/05/2024 (Originally 7/28/2001)    COVID-19 Vaccine (6 - Pfizer series) 08/17/2024 (Originally 3/20/2022)    DIABETIC EYE EXAM  08/17/2024 (Originally 7/17/2018)    INFLUENZA VACCINE  10/01/2023    HEMOGLOBIN A1C  02/10/2024    LIPID PANEL  03/30/2024    URINE MICROALBUMIN  03/30/2024    DXA SCAN  07/27/2025    COLORECTAL CANCER SCREENING  08/03/2033    HEPATITIS C  SCREENING  Completed                CMS Preventative Services Quick Reference  Risk Factors Identified During Encounter  None Identified  The above risks/problems have been discussed with the patient.  Pertinent information has been shared with the patient in the After Visit Summary.  An After Visit Summary and PPPS were made available to the patient.    Follow Up:   Next Medicare Wellness visit to be scheduled in 1 year.       Additional E&M Note during same encounter follows:  Patient has multiple medical problems which are significant and separately identifiable that require additional work above and beyond the Medicare Wellness Visit.        Chief Complaint  Hypertension, Hyperlipidemia, Medicare Wellness-subsequent, Diabetes, and Heartburn    SUBJECTIVE  Melania Centeno presents to [facility]    History of Present Illness  Past Medical History:   Diagnosis Date    Cervical spinal stenosis 04/03/2019    Brisk reflexes suggestive of myelopathy    Cervicalgia     Diverticulitis of colon     Foot pain     Fusion of spine of cervical region 06/19/2019    GERD (gastroesophageal reflux disease)     Hyperlipidemia     Hypertension     Type 2 diabetes mellitus       Family History   Problem Relation Age of Onset    Diabetes Mother         Unspecified    Breast cancer Sister     Brain cancer Sister     Colon cancer Sister 62    Heart disease Sister     Heart disease Brother     Diabetes Brother         Unspecified      Past Surgical History:   Procedure Laterality Date    BREAST BIOPSY      CERVICAL FUSION  03/14/2019    right C4-5,C5-6    CHOLECYSTECTOMY      COLONOSCOPY      COLONOSCOPY N/A 8/3/2023    Procedure: COLONOSCOPY WITH POLYPECTOMIES;  Surgeon: Neal Delaney MD;  [facility]: [facility] ENDOSCOPY;  Service: Gastroenterology;  Laterality: N/A;  COLON POLYPS, DIVERTICULOSIS    ENDOSCOPY N/A 8/3/2023    Procedure: ESOPHAGOGASTRODUODENOSCOPY WITH BIOPSIES;  Surgeon: Neal Delaney  MD Holden;  Location: [facility] ENDOSCOPY;  Service: Gastroenterology;  Laterality: N/A;  HIATAL HERNIA, REFLUX ESOPHAGITIS    HEMORRHOIDECTOMY      KNEE SURGERY      TUBAL ABDOMINAL LIGATION          Current Outpatient Medications:     amLODIPine (NORVASC) 5 MG tablet, 1 and 1/2 tab PO QD, Disp: 135 tablet, Rfl: 1    atorvastatin (LIPITOR) 10 MG tablet, Take 1 tablet by mouth Daily., Disp: 90 tablet, Rfl: 1    benazepril (LOTENSIN) 40 MG tablet, Take 1 tablet by mouth Daily., Disp: 90 tablet, Rfl: 1    Continuous Blood Gluc Sensor (FreeStyle Renae 3 Sensor) misc, 1 each Every 14 (Fourteen) Days., Disp: 2 each, Rfl: 5    Dulaglutide (Trulicity) 0.75 MG/0.5ML solution pen-injector, Inject 0.75 mg under the skin into the appropriate area as directed Every 7 (Seven) Days for 4 doses., Disp: 2 mL, Rfl: 5    DULoxetine (CYMBALTA) 60 MG capsule, , Disp: , Rfl:     glipizide (GLUCOTROL) 10 MG tablet, Take 1 tablet by mouth 2 (Two) Times a Day Before Meals., Disp: 180 tablet, Rfl: 1    glucose blood (FREESTYLE LITE) test strip, Use with glucose meter to check blood sugars twice a day (DX. E11.65)  Indications: Diabetes, Disp: 100 each, Rfl: 6    HYDROcodone-acetaminophen (NORCO) 7.5-325 MG per tablet, Take 1 tablet by mouth Every 12 (Twelve) Hours As Needed., Disp: , Rfl:     insulin degludec (Tresiba FlexTouch) 100 UNIT/ML solution pen-injector injection, Inject 60 Units under the skin into the appropriate area as directed Daily., Disp: 45 mL, Rfl: 0    Insulin Pen Needle (Droplet Pen Needles) 32G X 5 MM misc, Inject 1 each under the skin into the appropriate area as directed Daily., Disp: 200 each, Rfl: 1    metoprolol succinate XL (TOPROL-XL) 25 MG 24 hr tablet, Take 1 tablet by mouth Daily., Disp: 90 tablet, Rfl: 3    omeprazole (priLOSEC) 40 MG capsule, Take 1 capsule by mouth Daily., Disp: 90 capsule, Rfl: 1    ondansetron (Zofran) 4 MG tablet, Take 1 tablet by mouth Every 8 (Eight) Hours As Needed for Nausea or  "Vomiting., Disp: 30 tablet, Rfl: 0    tiZANidine (ZANAFLEX) 4 MG tablet, , Disp: , Rfl:     OBJECTIVE  Vital Signs:   /83   Pulse 62   Ht 154.9 cm (60.98\")   Wt 89.4 kg (197 lb)   SpO2 97%   BMI 37.25 kg/mý    Estimated body mass index is 37.25 kg/mý as calculated from the following:    Height as of this encounter: 154.9 cm (60.98\").    Weight as of this encounter: 89.4 kg (197 lb).     Wt Readings from Last 3 Encounters:   08/17/23 89.4 kg (197 lb)   08/10/23 91.4 kg (201 lb 9.6 oz)   08/03/23 89 kg (196 lb 3.4 oz)     BP Readings from Last 3 Encounters:   08/17/23 176/83   08/10/23 110/70   08/03/23 140/79       Physical Exam  Vitals reviewed.   Constitutional:       Appearance: Normal appearance. She is well-developed.   HENT:      Head: Normocephalic and atraumatic.      Right Ear: External ear normal.      Left Ear: External ear normal.   Eyes:      Conjunctiva/sclera: Conjunctivae normal.      Pupils: Pupils are equal, round, and reactive to light.   Cardiovascular:      Rate and Rhythm: Normal rate and regular rhythm.      Heart sounds: No murmur heard.    No friction rub. No gallop.   Pulmonary:      Effort: Pulmonary effort is normal.      Breath sounds: Normal breath sounds. No wheezing or rhonchi.   Skin:     General: Skin is warm and dry.   Neurological:      Mental Status: She is alert and oriented to person, place, and time.      Cranial Nerves: No cranial nerve deficit.   Psychiatric:         Mood and Affect: Mood and affect normal.         Behavior: Behavior normal.         Thought Content: Thought content normal.         Judgment: Judgment normal.        Result Review    CMP          3/30/2023    13:02 5/11/2023    08:24 6/6/2023    02:35   CMP   Glucose 307   204    BUN 10   14    Creatinine 0.65  0.60  0.71    EGFR 94.3  96.1  91.0    Sodium 136   139    Potassium 4.2   4.3    Chloride 100   101    Calcium 9.1   9.6    Total Protein 7.5   8.0    Albumin 4.0   4.1    Globulin 3.5   3.9  "   Total Bilirubin 0.5   0.7    Alkaline Phosphatase 196   194    AST (SGOT) 35   37    ALT (SGPT) 31   33    Albumin/Globulin Ratio 1.1   1.1    BUN/Creatinine Ratio 15.4   19.7    Anion Gap 8.0   12.0      CBC          3/30/2023    13:02 4/27/2023    14:14 6/6/2023    02:35   CBC   WBC 5.26  6.54  11.38    RBC 4.66  4.77  4.98    Hemoglobin 14.1  14.5  15.1    Hematocrit 41.9  43.1  44.1    MCV 89.9  90.4  88.6    MCH 30.3  30.4  30.3    MCHC 33.7  33.6  34.2    RDW 13.3  13.3  13.7    Platelets 111  129  136      Lipid Panel          3/30/2023    13:02   Lipid Panel   Total Cholesterol 144    Triglycerides 141    HDL Cholesterol 63    VLDL Cholesterol 24    LDL Cholesterol  57    LDL/HDL Ratio 0.84      TSH          3/30/2023    13:02   TSH   TSH 2.160      Most Recent A1C          8/10/2023    11:16   HGBA1C Most Recent   Hemoglobin A1C 9.0        XR Chest 1 View    Result Date: 6/6/2023   No acute cardiopulmonary process identified.       ANNE HURTADO MD       Electronically Signed and Approved By: ANNE HURTADO MD on 6/06/2023 at 2:56             MRI abdomen w wo contrast mrcp    Result Date: 5/11/2023    1. Diffuse hepatic steatosis. 2. Nodular cirrhotic liver.  Multiple regenerative nodules are noted but no abnormal enhancing lesions are seen.  Recommend continued follow-up. 3. Cholecystectomy.  Common bile duct is mildly dilated measuring 9 millimeters which may be due to cholecystectomy.  No biliary narrowing or choledocholithiasis. 4. Left renal cyst.     JACQUELYN MICHELE MD       Electronically Signed and Approved By: JACQUELYN MICHELE MD on 5/11/2023 at 17:43                 The above data has been reviewed by CHINA Pinedo 08/17/2023 11:44 EDT.          Patient Care Team:  Kelsey Hoyos APRN as PCP - General (Nurse Practitioner)  Caridad Rios APRN as Nurse Practitioner (Gastroenterology)  Jeramy Casey DPM as Consulting Physician (Podiatry)  Nubia Hernandez,  CHINA as Consulting Physician (Pain Medicine)  Jaclyn Eden, RUPA as Ambulatory  (Population Health)  Roly Torres MD as Consulting Physician (Cardiology)  Bonnie Awad APRN as Nurse Practitioner (Nurse Practitioner)             ASSESSMENT & PLAN    Diagnoses and all orders for this visit:    1. Medicare annual wellness visit, subsequent (Primary)    2. Essential hypertension  Assessment & Plan:  Hypertension poorly controlled at present, we are going to increase her dose of amlodipine to 1-1/2 tabs daily, patient to monitor her blood pressure closely at home, will follow-up in 3 months for reevaluation    Orders:  -     Discontinue: amLODIPine (NORVASC) 5 MG tablet; Take 1 tablet by mouth Daily.  Dispense: 90 tablet; Refill: 1  -     benazepril (LOTENSIN) 40 MG tablet; Take 1 tablet by mouth Daily.  Dispense: 90 tablet; Refill: 1  -     amLODIPine (NORVASC) 5 MG tablet; 1 and 1/2 tab PO QD  Dispense: 135 tablet; Refill: 1    3. Mixed hyperlipidemia  Overview:  Stable on Lipitor, continue current dose    Orders:  -     atorvastatin (LIPITOR) 10 MG tablet; Take 1 tablet by mouth Daily.  Dispense: 90 tablet; Refill: 1    4. Osteoporosis, unspecified osteoporosis type, unspecified pathological fracture presence  Comments:  Discussed the scan results with patient, patient has been referred to rheumatology for further eval and treatment    5. Type 2 diabetes mellitus with hyperglycemia, without long-term current use of insulin  Assessment & Plan:  A1c improving, previously 18, now at 9%, continue follow-ups with diabetes management    Orders:  -     insulin degludec (Tresiba FlexTouch) 100 UNIT/ML solution pen-injector injection; Inject 60 Units under the skin into the appropriate area as directed Daily.  Dispense: 45 mL; Refill: 0         Tobacco Use: Low Risk     Smoking Tobacco Use: Never    Smokeless Tobacco Use: Never    Passive Exposure: Not on file       Follow Up     Return in  about 3 months (around 11/17/2023), or if symptoms worsen or fail to improve.      Patient was given instructions and counseling regarding her condition or for health maintenance advice. Please see specific information pulled into the AVS if appropriate.   I have reviewed information obtained and documented by others and I have confirmed the accuracy of this documented note.    Kelsey Hoyos, APRN

## 2023-08-17 NOTE — ASSESSMENT & PLAN NOTE
Hypertension poorly controlled at present, we are going to increase her dose of amlodipine to 1-1/2 tabs daily, patient to monitor her blood pressure closely at home, will follow-up in 3 months for reevaluation

## 2023-08-17 NOTE — TELEPHONE ENCOUNTER
"Attempted to reach the patient on mobile number:  Patient answered phone and stated \"What\", then hung up.  Tried calling again in case there were phone issues:  Patient answered and immediately hung up.    Was able to reach alternate contact.  He attempted to give the phone to the patient and then the call ended.    Patient was a no show at follow up appointment with CHINA Miller today.    "

## 2023-08-21 ENCOUNTER — PREP FOR SURGERY (OUTPATIENT)
Dept: OTHER | Facility: HOSPITAL | Age: 73
End: 2023-08-21
Payer: MEDICARE

## 2023-08-21 DIAGNOSIS — Z09 ENCOUNTER FOR COLONOSCOPY FOLLOWING COLON POLYP REMOVAL: Primary | ICD-10-CM

## 2023-08-21 DIAGNOSIS — Z86.010 ENCOUNTER FOR COLONOSCOPY FOLLOWING COLON POLYP REMOVAL: Primary | ICD-10-CM

## 2023-08-21 RX ORDER — ONDANSETRON 4 MG/1
TABLET, FILM COATED ORAL
Qty: 2 TABLET | Refills: 0 | Status: SHIPPED | OUTPATIENT
Start: 2023-08-21

## 2023-08-21 NOTE — TELEPHONE ENCOUNTER
"Attempted to contact patient, left voicemail message to return call.    Addendum:  patient returned call.    Spoke to patient and informed of CHINA Miller result note and recommendations.  Patient verified understanding.     Patient states she has been taking omeprazole as prescribed.  Educated on PPI medications.    Colonoscopy:  reports she was able to tolerate \"most\" of the bowel prep, however became very nauseous at the end of both doses.  States she did not vomit and that she was on clear liquids the entire day before the procedure.    Agreeable to repeat colonoscopy in December on a Thursday (for transportation).  Advised of extended bowel prep for next attempt.    Follow up appointment was rescheduled with CHINA Miller on 8/30/23 at 0945.    CR entered for second sign.  Cardiac clearance needed from Dr. Torres.    Can patient be sent a script for Zofran to be used when taking bowel prep?        "

## 2023-08-22 PROBLEM — Z12.11 ENCOUNTER FOR COLONOSCOPY FOLLOWING COLON POLYP REMOVAL: Status: ACTIVE | Noted: 2023-08-22

## 2023-08-22 PROBLEM — Z86.010 ENCOUNTER FOR COLONOSCOPY FOLLOWING COLON POLYP REMOVAL: Status: ACTIVE | Noted: 2023-08-22

## 2023-08-22 PROBLEM — Z86.0100 ENCOUNTER FOR COLONOSCOPY FOLLOWING COLON POLYP REMOVAL: Status: ACTIVE | Noted: 2023-08-22

## 2023-08-22 PROBLEM — Z09 ENCOUNTER FOR COLONOSCOPY FOLLOWING COLON POLYP REMOVAL: Status: ACTIVE | Noted: 2023-08-22

## 2023-08-23 ENCOUNTER — TELEPHONE (OUTPATIENT)
Dept: GASTROENTEROLOGY | Facility: CLINIC | Age: 73
End: 2023-08-23
Payer: MEDICARE

## 2023-08-23 NOTE — TELEPHONE ENCOUNTER
2023    Dear Dr. Torres,      Patient Name: Melania Centeno  : 1950      This patient is waiting to have a Colonoscopy  which I will perform at [facility] on 23. Please respond to this request noting your recommendations regarding clearance from a Cardiac  standpoint.  You may contact our office at [phone] Option 2 with any questions. I appreciate your prompt response in this matter. Please return this form to our office as soon as possible to [phone].    ____ I approve my patient from a Cardiac  standpoint    ____ I do NOT approve my patient from a Cardiac  standpoint at this time      Please specify clearance expiration date:____________________________________      Approving physician name (please print): _____________________________________________      Approving physician signature: ________________________________ Date:________________  Sincerely,  [facility] - Gastroenterology   Dr. Delaney      Please fax approval or denial to our office as soon as possible.

## 2023-08-23 NOTE — TELEPHONE ENCOUNTER
Spoke with patient and informed of scheduled colonoscopy on Thursday, 12/14/23 with arrival-time of 0600.  Educated on recommended low residue diet, extended bowel prep and Zofran ordered for bowel prep.  Reminded that a  is necessary post-procedure.  Patient verbalized understanding.    Verified address, mailed [employer] brochure, low residue diet information and detailed extended bowel prep instructions.    Reminded patient of upcoming appointment with Adilson Miller on 8/30/23 at 0945.    Cardiac clearance requested from Dr. Torres via TE dated 8/23 and routed to Rocío MENDEZ RN.

## 2023-08-24 ENCOUNTER — PATIENT ROUNDING (BHMG ONLY) (OUTPATIENT)
Dept: DIABETES SERVICES | Facility: HOSPITAL | Age: 73
End: 2023-08-24
Payer: MEDICARE

## 2023-08-24 ENCOUNTER — HOSPITAL ENCOUNTER (EMERGENCY)
Facility: HOSPITAL | Age: 73
Discharge: HOME | End: 2023-08-24
Attending: EMERGENCY MEDICINE
Payer: MEDICARE

## 2023-08-24 VITALS
DIASTOLIC BLOOD PRESSURE: 54 MMHG | SYSTOLIC BLOOD PRESSURE: 140 MMHG | OXYGEN SATURATION: 96 % | HEART RATE: 86 BPM | WEIGHT: 198.63 LBS | HEIGHT: 61 IN | RESPIRATION RATE: 22 BRPM | TEMPERATURE: 98.5 F | BODY MASS INDEX: 37.5 KG/M2

## 2023-08-24 DIAGNOSIS — E16.2 HYPOGLYCEMIA: Primary | ICD-10-CM

## 2023-08-24 LAB
ALBUMIN SERPL-MCNC: 4.5 G/DL (ref 3.5–5.2)
ALBUMIN/GLOB SERPL: 1.2 G/DL
ALP SERPL-CCNC: 168 U/L (ref 39–117)
ALT SERPL W P-5'-P-CCNC: 34 U/L (ref 1–33)
ANION GAP SERPL CALCULATED.3IONS-SCNC: 13.8 MMOL/L (ref 5–15)
AST SERPL-CCNC: 44 U/L (ref 1–32)
BASOPHILS # BLD AUTO: 0.07 10*3/MM3 (ref 0–0.2)
BASOPHILS NFR BLD AUTO: 0.6 % (ref 0–1.5)
BILIRUB SERPL-MCNC: 0.6 MG/DL (ref 0–1.2)
BUN SERPL-MCNC: 11 MG/DL (ref 8–23)
BUN/CREAT SERPL: 15.7 (ref 7–25)
CALCIUM SPEC-SCNC: 9.4 MG/DL (ref 8.6–10.5)
CHLORIDE SERPL-SCNC: 100 MMOL/L (ref 98–107)
CO2 SERPL-SCNC: 25.2 MMOL/L (ref 22–29)
CREAT SERPL-MCNC: 0.7 MG/DL (ref 0.57–1)
DEPRECATED RDW RBC AUTO: 43.6 FL (ref 37–54)
EGFRCR SERPLBLD CKD-EPI 2021: 92 ML/MIN/1.73
EOSINOPHIL # BLD AUTO: 0.05 10*3/MM3 (ref 0–0.4)
EOSINOPHIL NFR BLD AUTO: 0.4 % (ref 0.3–6.2)
ERYTHROCYTE [DISTWIDTH] IN BLOOD BY AUTOMATED COUNT: 13.3 % (ref 12.3–15.4)
GLOBULIN UR ELPH-MCNC: 3.7 GM/DL
GLUCOSE BLDC GLUCOMTR-MCNC: 124 MG/DL (ref 70–99)
GLUCOSE BLDC GLUCOMTR-MCNC: 93 MG/DL (ref 70–99)
GLUCOSE BLDC GLUCOMTR-MCNC: 96 MG/DL (ref 70–99)
GLUCOSE BLDC GLUCOMTR-MCNC: 96 MG/DL (ref 70–99)
GLUCOSE SERPL-MCNC: 118 MG/DL (ref 65–99)
HCT VFR BLD AUTO: 45.7 % (ref 34–46.6)
HGB BLD-MCNC: 15.6 G/DL (ref 12–15.9)
HOLD SPECIMEN: NORMAL
HOLD SPECIMEN: NORMAL
IMM GRANULOCYTES # BLD AUTO: 0.12 10*3/MM3 (ref 0–0.05)
IMM GRANULOCYTES NFR BLD AUTO: 1 % (ref 0–0.5)
LIPASE SERPL-CCNC: 48 U/L (ref 13–60)
LYMPHOCYTES # BLD AUTO: 1.65 10*3/MM3 (ref 0.7–3.1)
LYMPHOCYTES NFR BLD AUTO: 13.4 % (ref 19.6–45.3)
MCH RBC QN AUTO: 30.3 PG (ref 26.6–33)
MCHC RBC AUTO-ENTMCNC: 34.1 G/DL (ref 31.5–35.7)
MCV RBC AUTO: 88.7 FL (ref 79–97)
MONOCYTES # BLD AUTO: 0.8 10*3/MM3 (ref 0.1–0.9)
MONOCYTES NFR BLD AUTO: 6.5 % (ref 5–12)
NEUTROPHILS NFR BLD AUTO: 78.1 % (ref 42.7–76)
NEUTROPHILS NFR BLD AUTO: 9.63 10*3/MM3 (ref 1.7–7)
NRBC BLD AUTO-RTO: 0 /100 WBC (ref 0–0.2)
PLATELET # BLD AUTO: 141 10*3/MM3 (ref 140–450)
PMV BLD AUTO: 11.4 FL (ref 6–12)
POTASSIUM SERPL-SCNC: 4 MMOL/L (ref 3.5–5.2)
PROT SERPL-MCNC: 8.2 G/DL (ref 6–8.5)
RBC # BLD AUTO: 5.15 10*6/MM3 (ref 3.77–5.28)
SODIUM SERPL-SCNC: 139 MMOL/L (ref 136–145)
WBC NRBC COR # BLD: 12.32 10*3/MM3 (ref 3.4–10.8)
WHOLE BLOOD HOLD COAG: NORMAL
WHOLE BLOOD HOLD SPECIMEN: NORMAL

## 2023-08-24 PROCEDURE — 83690 ASSAY OF LIPASE: CPT | Performed by: EMERGENCY MEDICINE

## 2023-08-24 PROCEDURE — 82948 REAGENT STRIP/BLOOD GLUCOSE: CPT

## 2023-08-24 PROCEDURE — 80053 COMPREHEN METABOLIC PANEL: CPT | Performed by: EMERGENCY MEDICINE

## 2023-08-24 PROCEDURE — 96374 THER/PROPH/DIAG INJ IV PUSH: CPT

## 2023-08-24 PROCEDURE — 99283 EMERGENCY DEPT VISIT LOW MDM: CPT

## 2023-08-24 PROCEDURE — 25010000002 ONDANSETRON PER 1 MG: Performed by: EMERGENCY MEDICINE

## 2023-08-24 PROCEDURE — 25010000002 METOCLOPRAMIDE PER 10 MG

## 2023-08-24 PROCEDURE — 85025 COMPLETE CBC W/AUTO DIFF WBC: CPT | Performed by: EMERGENCY MEDICINE

## 2023-08-24 PROCEDURE — 96375 TX/PRO/DX INJ NEW DRUG ADDON: CPT

## 2023-08-24 RX ORDER — METOCLOPRAMIDE HYDROCHLORIDE 5 MG/ML
10 INJECTION INTRAMUSCULAR; INTRAVENOUS ONCE
Status: COMPLETED | OUTPATIENT
Start: 2023-08-24 | End: 2023-08-24

## 2023-08-24 RX ORDER — DEXTROSE MONOHYDRATE 25 G/50ML
25 INJECTION, SOLUTION INTRAVENOUS ONCE
Status: DISCONTINUED | OUTPATIENT
Start: 2023-08-24 | End: 2023-08-25 | Stop reason: HOSPADM

## 2023-08-24 RX ORDER — SODIUM CHLORIDE 0.9 % (FLUSH) 0.9 %
10 SYRINGE (ML) INJECTION AS NEEDED
Status: DISCONTINUED | OUTPATIENT
Start: 2023-08-24 | End: 2023-08-25 | Stop reason: HOSPADM

## 2023-08-24 RX ORDER — ONDANSETRON 2 MG/ML
4 INJECTION INTRAMUSCULAR; INTRAVENOUS ONCE
Status: COMPLETED | OUTPATIENT
Start: 2023-08-24 | End: 2023-08-24

## 2023-08-24 RX ADMIN — METOCLOPRAMIDE HYDROCHLORIDE 10 MG: 5 INJECTION INTRAMUSCULAR; INTRAVENOUS at 20:16

## 2023-08-24 RX ADMIN — Medication 10 ML: at 20:31

## 2023-08-24 RX ADMIN — SODIUM CHLORIDE 1000 ML: 9 INJECTION, SOLUTION INTRAVENOUS at 18:16

## 2023-08-24 RX ADMIN — ONDANSETRON 4 MG: 2 INJECTION INTRAMUSCULAR; INTRAVENOUS at 17:36

## 2023-08-24 NOTE — PROGRESS NOTES
August 24, 2023    Hello, may I speak with Melania Centeno?    My name is Denisse Aguilar.      [facility] am  with [facility] [address] [facility] [address] [phone].    Before we get started may I verify your date of birth? 1950    I am calling to officially welcome you to our practice and ask about your recent visit. Is this a good time to talk? no    Left voicemail per script  Thank you, and have a great day.

## 2023-08-25 NOTE — PROGRESS NOTES
1600 Patients daughter called the office to report her mom took the wrong medication. She took her daughters dose of Trulicity 1.5 mg once weekly instead of her dose of trulicity 0.75mg once weekly. States she administered it in her leg and now she is screaming get it out of me. States she was very confused and out of it initially but then she broke out in a sweat and is vomiting. She already called 911 and they are on the way. I had he r daughter check her glucose and it was 59. Instructed her to drink something sweet such as soda, apple juice or orange juice. She gave her apple juice but she kept spitting it out saying she did not like it. Her daughter then gave her Gianluca D and she was able to get it down. I had her recheck the glucose 5 min later and it was up to 62. Her daughter stated she seemed more alert. She was still screaming in the background while talking t o her daughter. Her daughter stated she started throwing up again. I had her recheck the glucose again and it was down to 58. Instructed daughter to give her more Gianluca D. She had not taken her insulin yet today . I instructed her to hold the insulin. EMS arrived while I was on the phone and transported her to the hospital.

## 2023-08-25 NOTE — ED PROVIDER NOTES
Time: 8:23 PM EDT  Date of encounter:  8/24/2023  Independent Historian/Clinical History and Information was obtained by:   Patient and Family    History is limited by: N/A    Chief Complaint: Low blood sugar      History of Present Illness:  Patient is a 73 y.o. year old female who presents to the emergency department for evaluation of low blood sugar.  Patient states she felt normal until she inadvertently took 1 extra dose of her Trulicity.  Approximately 1 hour later she has been feeling generally poor, weak fatigued and nauseated.  Her blood sugar was low at home.  Since being here and having her blood sugar back to normal her symptoms have mostly resolved aside from some mild nausea.  At no point did she have headache, chest pain, abdominal pain, numbness tingling or focal weakness.  She relates all of her symptoms to being due to the extra Trulicity.    HPI    Patient Care Team  Primary Care Provider: Kelsey Hoyos APRN    Past Medical History:     No Known Allergies  Past Medical History:   Diagnosis Date    Cervical spinal stenosis 04/03/2019    Brisk reflexes suggestive of myelopathy    Cervicalgia     Diverticulitis of colon     Foot pain     Fusion of spine of cervical region 06/19/2019    GERD (gastroesophageal reflux disease)     Hyperlipidemia     Hypertension     Type 2 diabetes mellitus      Past Surgical History:   Procedure Laterality Date    BREAST BIOPSY      CERVICAL FUSION  03/14/2019    right C4-5,C5-6    CHOLECYSTECTOMY      COLONOSCOPY      COLONOSCOPY N/A 8/3/2023    Procedure: COLONOSCOPY WITH POLYPECTOMIES;  Surgeon: Neal Delaney MD;  Location: [facility] ENDOSCOPY;  Service: Gastroenterology;  Laterality: N/A;  COLON POLYPS, DIVERTICULOSIS    ENDOSCOPY N/A 8/3/2023    Procedure: ESOPHAGOGASTRODUODENOSCOPY WITH BIOPSIES;  Surgeon: Neal Delaney MD;  Location: [facility] ENDOSCOPY;  Service: Gastroenterology;  Laterality: N/A;  HIATAL HERNIA, REFLUX ESOPHAGITIS     HEMORRHOIDECTOMY      KNEE SURGERY      TUBAL ABDOMINAL LIGATION       Family History   Problem Relation Age of Onset    Diabetes Mother         Unspecified    Breast cancer Sister     Brain cancer Sister     Colon cancer Sister 62    Heart disease Sister     Heart disease Brother     Diabetes Brother         Unspecified       Home Medications:  Prior to Admission medications    Medication Sig Start Date End Date Taking? Authorizing Provider   amLODIPine (NORVASC) 5 MG tablet 1 and 1/2 tab PO QD 8/17/23   Kelsey Hoyos APRN   atorvastatin (LIPITOR) 10 MG tablet Take 1 tablet by mouth Daily. 8/17/23   Kelsey Hoyos APRN   benazepril (LOTENSIN) 40 MG tablet Take 1 tablet by mouth Daily. 8/17/23   Kelsey Hoyos APRN   Continuous Blood Gluc Sensor (FreeStyle Renae 3 Sensor) misc 1 each Every 14 (Fourteen) Days. 8/10/23   Bonnie Awad APRN   Dulaglutide (Trulicity) 0.75 MG/0.5ML solution pen-injector Inject 0.75 mg under the skin into the appropriate area as directed Every 7 (Seven) Days for 4 doses. 8/10/23 9/1/23  Bonnie Awad APRN   DULoxetine (CYMBALTA) 60 MG capsule  12/28/22   ProviderGia MD   glipizide (GLUCOTROL) 10 MG tablet Take 1 tablet by mouth 2 (Two) Times a Day Before Meals. 1/5/23   Kelsey Hoyos APRN   glucose blood (FREESTYLE LITE) test strip Use with glucose meter to check blood sugars twice a day (DX. E11.65)  Indications: Diabetes 7/11/23   Kelsey Hoyos APRN   HYDROcodone-acetaminophen (NORCO) 7.5-325 MG per tablet Take 1 tablet by mouth Every 12 (Twelve) Hours As Needed. 12/6/22   ProviderGia MD   insulin degludec (Tresiba FlexTouch) 100 UNIT/ML solution pen-injector injection Inject 60 Units under the skin into the appropriate area as directed Daily. 8/17/23   Kelsey Hoyos APRN   Insulin Pen Needle (Droplet Pen Needles) 32G X 5 MM misc Inject 1 each under the skin into the appropriate area as directed Daily.  1/5/23   Kelsey Hoyos APRN   metoprolol succinate XL (TOPROL-XL) 25 MG 24 hr tablet Take 1 tablet by mouth Daily. 7/24/23   Roly Torres MD   omeprazole (priLOSEC) 40 MG capsule Take 1 capsule by mouth Daily. 8/7/23   Kelsey Hoyos APRN   ondansetron (Zofran) 4 MG tablet Take 1 tablet by mouth Every 8 (Eight) Hours As Needed for Nausea or Vomiting. 8/10/23   Bonnie Awad APRN   ondansetron (Zofran) 4 MG tablet 1 po 1 hour before prep, may repeat in 4-6 hrs as needed for n/v 8/21/23   Caridad Rios APRN   polyethylene glycol (GoLYTELY) 236 g solution Mix solution.  Drink 2/3 of solution at 6:00 PM on the evening prior to procedure.  Drink remaining 1/3 of solution four hours prior to the scheduled arrival-time on the morning of the procedure. 8/21/23   Caridad Rios APRN   tiZANidine (ZANAFLEX) 4 MG tablet  12/28/22   Provider, MD Gia        Social History:   Social History     Tobacco Use    Smoking status: Never    Smokeless tobacco: Never   Vaping Use    Vaping Use: Never used   Substance Use Topics    Alcohol use: Never    Drug use: No         Review of Systems:  Review of Systems   Constitutional:  Positive for activity change and fatigue. Negative for chills and fever.   HENT:  Negative for congestion, rhinorrhea and sore throat.    Eyes:  Negative for photophobia.   Respiratory:  Negative for apnea, cough, chest tightness and shortness of breath.    Cardiovascular:  Negative for chest pain and palpitations.   Gastrointestinal:  Positive for nausea and vomiting. Negative for abdominal pain and diarrhea.   Endocrine: Negative.    Genitourinary:  Negative for difficulty urinating and dysuria.   Musculoskeletal:  Negative for back pain, joint swelling and myalgias.   Skin:  Negative for color change and wound.   Allergic/Immunologic: Negative.    Neurological:  Positive for weakness. Negative for seizures and headaches.   Psychiatric/Behavioral:  "Negative.     All other systems reviewed and are negative.     Physical Exam:  /83   Pulse 91   Temp 98.5 øF (36.9 øC) (Oral)   Resp 22   Ht 154.9 cm (61\")   Wt 90.1 kg (198 lb 10.2 oz)   SpO2 96%   BMI 37.53 kg/mý     Physical Exam  Vitals and nursing note reviewed.   Constitutional:       General: She is awake.      Appearance: Normal appearance.   HENT:      Head: Normocephalic and atraumatic.      Nose: Nose normal.      Mouth/Throat:      Mouth: Mucous membranes are moist.   Eyes:      Extraocular Movements: Extraocular movements intact.      Pupils: Pupils are equal, round, and reactive to light.   Cardiovascular:      Rate and Rhythm: Normal rate and regular rhythm.      Heart sounds: Normal heart sounds.   Pulmonary:      Effort: Pulmonary effort is normal. No respiratory distress.      Breath sounds: Normal breath sounds. No wheezing, rhonchi or rales.   Abdominal:      General: Bowel sounds are normal.      Palpations: Abdomen is soft.      Tenderness: There is no abdominal tenderness. There is no guarding or rebound.      Comments: No rigidity   Musculoskeletal:         General: No tenderness. Normal range of motion.      Cervical back: Normal range of motion and neck supple.   Skin:     General: Skin is warm and dry.      Coloration: Skin is not jaundiced.   Neurological:      General: No focal deficit present.      Mental Status: She is alert and oriented to person, place, and time. Mental status is at baseline.      Sensory: Sensation is intact.      Motor: Motor function is intact.      Coordination: Coordination is intact.   Psychiatric:         Attention and Perception: Attention and perception normal.         Mood and Affect: Mood and affect normal.         Speech: Speech normal.         Behavior: Behavior normal.         Judgment: Judgment normal.                Procedures:  Procedures      Medical Decision Making:      Comorbidities that affect care:    Hypertension, type 2 diabetes, " GERD, hyperlipidemia    External Notes reviewed:    Encounter review: Office visit 8/17/2023 with family medicine for Medicare annual wellness visit      The following orders were placed and all results were independently analyzed by me:  Orders Placed This Encounter   Procedures    [place] Draw    Comprehensive Metabolic Panel    Urinalysis With Microscopic If Indicated (No Culture) - Urine, Clean Catch    Lipase    CBC Auto Differential    Continuous Pulse Oximetry    Vital Signs    Oxygen Therapy- Nasal Cannula; Titrate 1-6 LPM Per SpO2; 90 - 95%    POC Glucose Q1H    POC Glucose Once    POC Glucose Once    POC Glucose Once    POC Glucose Once    Insert Peripheral IV    Green Top (Gel)    Lavender Top    Gold Top - SST    Light Blue Top    CBC & Differential       Medications Given in the Emergency Department:  Medications   sodium chloride 0.9 % flush 10 mL (10 mL Intravenous Given 8/24/23 2031)   dextrose (D50W) (25 g/50 mL) IV injection 25 g (0 g Intravenous Hold 8/24/23 1853)   ondansetron (ZOFRAN) injection 4 mg (4 mg Intravenous Given 8/24/23 1736)   sodium chloride 0.9 % bolus 1,000 mL (0 mL Intravenous Stopped 8/24/23 1846)   metoclopramide (REGLAN) injection 10 mg (10 mg Intravenous Given 8/24/23 2016)        ED Course:    ED Course as of 08/24/23 2255   Thu Aug 24, 2023   2220 Reevaluation: Asymptomatic and strongly requesting discharge home. [RP]      ED Course User Index  [RP] Jose Lomax MD       Labs:    Lab Results (last 24 hours)       Procedure Component Value Units Date/Time    CBC & Differential [[id number]]  (Abnormal) Collected: 08/24/23 1740    Specimen: Blood Updated: 08/24/23 1754    Narrative:      The following orders were created for panel order CBC & Differential.  Procedure                               Abnormality         Status                     ---------                               -----------         ------                     CBC Auto Differential[[id number]]         Abnormal            Final result                 Please view results for these tests on the individual orders.    Comprehensive Metabolic Panel [[id number]]  (Abnormal) Collected: 08/24/23 1740    Specimen: Blood Updated: 08/24/23 1814     Glucose 118 mg/dL      BUN 11 mg/dL      Creatinine 0.70 mg/dL      Sodium 139 mmol/L      Potassium 4.0 mmol/L      Comment: Specimen hemolyzed.  Results may be affected.        Chloride 100 mmol/L      CO2 25.2 mmol/L      Calcium 9.4 mg/dL      Total Protein 8.2 g/dL      Albumin 4.5 g/dL      ALT (SGPT) 34 U/L      Comment: Specimen hemolyzed.  Results may be affected.        AST (SGOT) 44 U/L      Comment: Specimen hemolyzed.  Results may be affected.        Alkaline Phosphatase 168 U/L      Total Bilirubin 0.6 mg/dL      Globulin 3.7 gm/dL      A/G Ratio 1.2 g/dL      BUN/Creatinine Ratio 15.7     Anion Gap 13.8 mmol/L      eGFR 92.0 mL/min/1.73     Narrative:      GFR Normal >60  Chronic Kidney Disease <60  Kidney Failure <15    The GFR formula is only valid for adults with stable renal function between ages 18 and 70.    Lipase [[id number]]  (Normal) Collected: 08/24/23 1740    Specimen: Blood Updated: 08/24/23 1812     Lipase 48 U/L     CBC Auto Differential [[id number]]  (Abnormal) Collected: 08/24/23 1740    Specimen: Blood Updated: 08/24/23 1754     WBC 12.32 10*3/mm3      RBC 5.15 10*6/mm3      Hemoglobin 15.6 g/dL      Hematocrit 45.7 %      MCV 88.7 fL      MCH 30.3 pg      MCHC 34.1 g/dL      RDW 13.3 %      RDW-SD 43.6 fl      MPV 11.4 fL      Platelets 141 10*3/mm3      Neutrophil % 78.1 %      Lymphocyte % 13.4 %      Monocyte % 6.5 %      Eosinophil % 0.4 %      Basophil % 0.6 %      Immature Grans % 1.0 %      Neutrophils, Absolute 9.63 10*3/mm3      Lymphocytes, Absolute 1.65 10*3/mm3      Monocytes, Absolute 0.80 10*3/mm3      Eosinophils, Absolute 0.05 10*3/mm3      Basophils, Absolute 0.07 10*3/mm3      Immature Grans, Absolute 0.12 10*3/mm3      nRBC 0.0  /100 WBC     POC Glucose Once [[id number]]  (Abnormal) Collected: 08/24/23 1744    Specimen: Blood Updated: 08/24/23 1745     Glucose 124 mg/dL      Comment: Serial Number: [id number]:  [id number]       POC Glucose Once [[id number]]  (Normal) Collected: 08/24/23 1853    Specimen: Blood Updated: 08/24/23 1855     Glucose 93 mg/dL      Comment: Serial Number: [id number]:  [id number]       POC Glucose Once [[id number]]  (Normal) Collected: 08/24/23 2014    Specimen: Blood Updated: 08/24/23 2015     Glucose 96 mg/dL      Comment: Serial Number: [id number]:  [id number]       POC Glucose Once [[id number]]  (Normal) Collected: 08/24/23 2210    Specimen: Blood Updated: 08/24/23 2212     Glucose 96 mg/dL      Comment: Serial Number: [id number]:  [id number]                Imaging:    No Radiology Exams Resulted Within Past 24 Hours      Differential Diagnosis and Discussion:    Weakness: Based on the patient's history, signs, and symptoms, the diffential diagnosis includes but is not limited to meningitis, stroke, sepsis, subarachnoid hemorrhage, intracranial bleeding, encephalitis, acute uti, dehydration, MS, myasthenia gravis, Guillan [place], migraine variant, neuromuscular disorders vertigo, electrolyte imbalance, and metabolic disorders.    All labs were reviewed and interpreted by me.    MDM     Amount and/or Complexity of Data Reviewed  Clinical lab tests: reviewed  Decide to obtain previous medical records or to obtain history from someone other than the patient: yes             Patient Care Considerations:    CT HEAD: I considered ordering a noncontrast CT of the head, however patient has no headache or focal neurologic deficits.  She has no reported trauma.  Her symptoms have resolved after her blood sugar improved.      Consultants/Shared Management Plan:    None    Social Determinants of Health:    Patient is independent, reliable, and has access to care.       Disposition and Care  Coordination:    Discharged: The patient is suitable and stable for discharge with no need for consideration of observation or admission.    I have explained the patient's condition, diagnoses and treatment plan based on the information available to me at this time. I have answered questions and addressed any concerns. The patient has a good  understanding of the patient's diagnosis, condition, and treatment plan as can be expected at this point. The vital signs have been stable. The patient's condition is stable and appropriate for discharge from the emergency department.      The patient will pursue further outpatient evaluation with the primary care physician or other designated or consulting physician as outlined in the discharge instructions. They are agreeable to this plan of care and follow-up instructions have been explained in detail. The patient has received these instructions in written format and have expressed an understanding of the discharge instructions. The patient is aware that any significant change in condition or worsening of symptoms should prompt an immediate return to this or the closest emergency department or call to 911.    Final diagnoses:   Hypoglycemia        ED Disposition       ED Disposition   Discharge    Condition   Stable    Comment   --               This medical record created using voice recognition software.             Jose Lomax MD  08/24/23 2013       Jose Lomax MD  08/24/23 [id number]

## 2023-08-25 NOTE — DISCHARGE INSTRUCTIONS
Check your blood sugar every 2-4 hours for the next 48 hours.  Return to the emergency department immediately for worsening of symptoms.  As we discussed, the medication you inadvertently overdosed on is dangerous due to how long acting it is.

## 2023-10-16 NOTE — TELEPHONE ENCOUNTER
Patient is low risk for perioperative cardiac complications and may proceed.   Pt was to call back if she was not getting any better. Augmentin is not helping her rt ear pain and now the left ear hurts. Also has nasal congestion with drainage and a sore throat. Uses [facility] on [address].

## 2023-10-30 ENCOUNTER — TELEPHONE (OUTPATIENT)
Dept: FAMILY MEDICINE CLINIC | Facility: CLINIC | Age: 73
End: 2023-10-30
Payer: MEDICARE

## 2023-12-08 ENCOUNTER — TELEPHONE (OUTPATIENT)
Dept: GASTROENTEROLOGY | Facility: CLINIC | Age: 73
End: 2023-12-08
Payer: MEDICARE

## 2023-12-08 NOTE — TELEPHONE ENCOUNTER
Rec'd notification that pt did not answer PAT call. Called pt. She confirmed she is keeping her appt on 12.14.23. Warm-transferred her to our PAT RN, Chrissie.

## 2023-12-16 ENCOUNTER — APPOINTMENT (OUTPATIENT)
Dept: GENERAL RADIOLOGY | Facility: HOSPITAL | Age: 73
End: 2023-12-16
Payer: MEDICARE

## 2023-12-16 ENCOUNTER — HOSPITAL ENCOUNTER (EMERGENCY)
Facility: HOSPITAL | Age: 73
Discharge: HOME | End: 2023-12-16
Attending: EMERGENCY MEDICINE
Payer: MEDICARE

## 2023-12-16 VITALS
OXYGEN SATURATION: 96 % | WEIGHT: 209.88 LBS | RESPIRATION RATE: 12 BRPM | HEART RATE: 69 BPM | BODY MASS INDEX: 39.63 KG/M2 | DIASTOLIC BLOOD PRESSURE: 57 MMHG | HEIGHT: 61 IN | SYSTOLIC BLOOD PRESSURE: 121 MMHG | TEMPERATURE: 97.6 F

## 2023-12-16 DIAGNOSIS — E16.2 HYPOGLYCEMIA: Primary | ICD-10-CM

## 2023-12-16 LAB
ALBUMIN SERPL-MCNC: 3.9 G/DL (ref 3.5–5.2)
ALBUMIN SERPL-MCNC: 4 G/DL (ref 3.5–5.2)
ALBUMIN/GLOB SERPL: 1.1 G/DL
ALBUMIN/GLOB SERPL: 1.1 G/DL
ALP SERPL-CCNC: 122 U/L (ref 39–117)
ALP SERPL-CCNC: 122 U/L (ref 39–117)
ALT SERPL W P-5'-P-CCNC: 16 U/L (ref 1–33)
ALT SERPL W P-5'-P-CCNC: 16 U/L (ref 1–33)
ANION GAP SERPL CALCULATED.3IONS-SCNC: 10 MMOL/L (ref 5–15)
ANION GAP SERPL CALCULATED.3IONS-SCNC: 11 MMOL/L (ref 5–15)
ANISOCYTOSIS BLD QL: NORMAL
AST SERPL-CCNC: 24 U/L (ref 1–32)
AST SERPL-CCNC: 26 U/L (ref 1–32)
BASOPHILS # BLD AUTO: 0.03 10*3/MM3 (ref 0–0.2)
BASOPHILS NFR BLD AUTO: 0.5 % (ref 0–1.5)
BILIRUB SERPL-MCNC: 0.5 MG/DL (ref 0–1.2)
BILIRUB SERPL-MCNC: 0.5 MG/DL (ref 0–1.2)
BILIRUB UR QL STRIP: NEGATIVE
BUN SERPL-MCNC: 15 MG/DL (ref 8–23)
BUN SERPL-MCNC: 15 MG/DL (ref 8–23)
BUN/CREAT SERPL: 20.5 (ref 7–25)
BUN/CREAT SERPL: 21.7 (ref 7–25)
CALCIUM SPEC-SCNC: 8.8 MG/DL (ref 8.6–10.5)
CALCIUM SPEC-SCNC: 9 MG/DL (ref 8.6–10.5)
CHLORIDE SERPL-SCNC: 103 MMOL/L (ref 98–107)
CHLORIDE SERPL-SCNC: 103 MMOL/L (ref 98–107)
CK SERPL-CCNC: 117 U/L (ref 20–180)
CLARITY UR: CLEAR
CO2 SERPL-SCNC: 27 MMOL/L (ref 22–29)
CO2 SERPL-SCNC: 27 MMOL/L (ref 22–29)
COLOR UR: YELLOW
CREAT SERPL-MCNC: 0.69 MG/DL (ref 0.57–1)
CREAT SERPL-MCNC: 0.73 MG/DL (ref 0.57–1)
DEPRECATED RDW RBC AUTO: 45.6 FL (ref 37–54)
EGFRCR SERPLBLD CKD-EPI 2021: 87.5 ML/MIN/1.73
EGFRCR SERPLBLD CKD-EPI 2021: 92.3 ML/MIN/1.73
EOSINOPHIL # BLD AUTO: 0.1 10*3/MM3 (ref 0–0.4)
EOSINOPHIL NFR BLD AUTO: 1.6 % (ref 0.3–6.2)
ERYTHROCYTE [DISTWIDTH] IN BLOOD BY AUTOMATED COUNT: 13.8 % (ref 12.3–15.4)
GLOBULIN UR ELPH-MCNC: 3.5 GM/DL
GLOBULIN UR ELPH-MCNC: 3.8 GM/DL
GLUCOSE BLDC GLUCOMTR-MCNC: 137 MG/DL (ref 70–99)
GLUCOSE BLDC GLUCOMTR-MCNC: 161 MG/DL (ref 70–99)
GLUCOSE BLDC GLUCOMTR-MCNC: 80 MG/DL (ref 70–99)
GLUCOSE SERPL-MCNC: 134 MG/DL (ref 65–99)
GLUCOSE SERPL-MCNC: 154 MG/DL (ref 65–99)
GLUCOSE UR STRIP-MCNC: NEGATIVE MG/DL
HCT VFR BLD AUTO: 40 % (ref 34–46.6)
HGB BLD-MCNC: 13.1 G/DL (ref 12–15.9)
HGB UR QL STRIP.AUTO: NEGATIVE
HOLD SPECIMEN: NORMAL
HOLD SPECIMEN: NORMAL
IMM GRANULOCYTES # BLD AUTO: 0.03 10*3/MM3 (ref 0–0.05)
IMM GRANULOCYTES NFR BLD AUTO: 0.5 % (ref 0–0.5)
KETONES UR QL STRIP: NEGATIVE
LEUKOCYTE ESTERASE UR QL STRIP.AUTO: NEGATIVE
LIPASE SERPL-CCNC: 25 U/L (ref 13–60)
LYMPHOCYTES # BLD AUTO: 1.21 10*3/MM3 (ref 0.7–3.1)
LYMPHOCYTES NFR BLD AUTO: 19.5 % (ref 19.6–45.3)
MAGNESIUM SERPL-MCNC: 2.3 MG/DL (ref 1.6–2.4)
MCH RBC QN AUTO: 29.5 PG (ref 26.6–33)
MCHC RBC AUTO-ENTMCNC: 32.8 G/DL (ref 31.5–35.7)
MCV RBC AUTO: 90.1 FL (ref 79–97)
MONOCYTES # BLD AUTO: 0.58 10*3/MM3 (ref 0.1–0.9)
MONOCYTES NFR BLD AUTO: 9.3 % (ref 5–12)
NEUTROPHILS NFR BLD AUTO: 4.27 10*3/MM3 (ref 1.7–7)
NEUTROPHILS NFR BLD AUTO: 68.6 % (ref 42.7–76)
NITRITE UR QL STRIP: NEGATIVE
NRBC BLD AUTO-RTO: 0 /100 WBC (ref 0–0.2)
PH UR STRIP.AUTO: 6 [PH] (ref 5–8)
PLATELET # BLD AUTO: 107 10*3/MM3 (ref 140–450)
PMV BLD AUTO: 11.1 FL (ref 6–12)
POTASSIUM SERPL-SCNC: 3.4 MMOL/L (ref 3.5–5.2)
POTASSIUM SERPL-SCNC: 3.6 MMOL/L (ref 3.5–5.2)
PROT SERPL-MCNC: 7.4 G/DL (ref 6–8.5)
PROT SERPL-MCNC: 7.8 G/DL (ref 6–8.5)
PROT UR QL STRIP: NEGATIVE
RBC # BLD AUTO: 4.44 10*6/MM3 (ref 3.77–5.28)
SMALL PLATELETS BLD QL SMEAR: NORMAL
SODIUM SERPL-SCNC: 140 MMOL/L (ref 136–145)
SODIUM SERPL-SCNC: 141 MMOL/L (ref 136–145)
SP GR UR STRIP: 1.01 (ref 1–1.03)
TROPONIN T SERPL HS-MCNC: 11 NG/L
UROBILINOGEN UR QL STRIP: NORMAL
WBC MORPH BLD: NORMAL
WBC NRBC COR # BLD AUTO: 6.22 10*3/MM3 (ref 3.4–10.8)
WHOLE BLOOD HOLD COAG: NORMAL
WHOLE BLOOD HOLD SPECIMEN: NORMAL

## 2023-12-16 PROCEDURE — 85025 COMPLETE CBC W/AUTO DIFF WBC: CPT | Performed by: EMERGENCY MEDICINE

## 2023-12-16 PROCEDURE — 80053 COMPREHEN METABOLIC PANEL: CPT | Performed by: EMERGENCY MEDICINE

## 2023-12-16 PROCEDURE — 82948 REAGENT STRIP/BLOOD GLUCOSE: CPT

## 2023-12-16 PROCEDURE — 96374 THER/PROPH/DIAG INJ IV PUSH: CPT

## 2023-12-16 PROCEDURE — 83690 ASSAY OF LIPASE: CPT | Performed by: EMERGENCY MEDICINE

## 2023-12-16 PROCEDURE — 81003 URINALYSIS AUTO W/O SCOPE: CPT | Performed by: EMERGENCY MEDICINE

## 2023-12-16 PROCEDURE — 82550 ASSAY OF CK (CPK): CPT | Performed by: EMERGENCY MEDICINE

## 2023-12-16 PROCEDURE — 36415 COLL VENOUS BLD VENIPUNCTURE: CPT

## 2023-12-16 PROCEDURE — 85007 BL SMEAR W/DIFF WBC COUNT: CPT | Performed by: EMERGENCY MEDICINE

## 2023-12-16 PROCEDURE — 84484 ASSAY OF TROPONIN QUANT: CPT | Performed by: EMERGENCY MEDICINE

## 2023-12-16 PROCEDURE — 83735 ASSAY OF MAGNESIUM: CPT | Performed by: EMERGENCY MEDICINE

## 2023-12-16 PROCEDURE — 99283 EMERGENCY DEPT VISIT LOW MDM: CPT

## 2023-12-16 PROCEDURE — 25810000003 SODIUM CHLORIDE 0.9 % SOLUTION: Performed by: EMERGENCY MEDICINE

## 2023-12-16 PROCEDURE — 71045 X-RAY EXAM CHEST 1 VIEW: CPT

## 2023-12-16 RX ORDER — SODIUM CHLORIDE 0.9 % (FLUSH) 0.9 %
10 SYRINGE (ML) INJECTION AS NEEDED
Status: DISCONTINUED | OUTPATIENT
Start: 2023-12-16 | End: 2023-12-16 | Stop reason: HOSPADM

## 2023-12-16 RX ORDER — DEXTROSE MONOHYDRATE 25 G/50ML
25 INJECTION, SOLUTION INTRAVENOUS ONCE
Status: COMPLETED | OUTPATIENT
Start: 2023-12-16 | End: 2023-12-16

## 2023-12-16 RX ADMIN — DEXTROSE MONOHYDRATE 25 G: 25 INJECTION, SOLUTION INTRAVENOUS at 11:53

## 2023-12-16 RX ADMIN — SODIUM CHLORIDE 1000 ML: 9 INJECTION, SOLUTION INTRAVENOUS at 09:45

## 2023-12-16 NOTE — ED PROVIDER NOTES
Time: 1:04 PM EST  Date of encounter:  12/16/2023  Independent Historian/Clinical History and Information was obtained by:   Patient    History is limited by: N/A    Chief Complaint: Hypoglycemia      History of Present Illness:  Patient is a 73 y.o. year old female who presents to the emergency department for evaluation of hypoglycemia.  Patient's  gave her Tresiba.  Patient denies chest pain or shortness of breath.  Patient does report weakness.  Patient has no subjective neurological doves including numbness or tingling.  Patient has no cough hemoptysis.  Patient denies dysuria and urinary frequency.    HPI    Patient Care Team  Primary Care Provider: Kelsey Hoyos APRN    Past Medical History:     No Known Allergies  Past Medical History:   Diagnosis Date    Cervical spinal stenosis 04/03/2019    Brisk reflexes suggestive of myelopathy    Cervicalgia     Diverticulitis of colon     Foot pain     Fusion of spine of cervical region 06/19/2019    GERD (gastroesophageal reflux disease)     Hyperlipidemia     Hypertension     Type 2 diabetes mellitus      Past Surgical History:   Procedure Laterality Date    BREAST BIOPSY      CERVICAL FUSION  03/14/2019    right C4-5,C5-6    CHOLECYSTECTOMY      COLONOSCOPY      COLONOSCOPY N/A 8/3/2023    Procedure: COLONOSCOPY WITH POLYPECTOMIES;  Surgeon: Neal Delaney MD;  Location: [facility] ENDOSCOPY;  Service: Gastroenterology;  Laterality: N/A;  COLON POLYPS, DIVERTICULOSIS    ENDOSCOPY N/A 8/3/2023    Procedure: ESOPHAGOGASTRODUODENOSCOPY WITH BIOPSIES;  Surgeon: Neal Delaney MD;  Location: [facility] ENDOSCOPY;  Service: Gastroenterology;  Laterality: N/A;  HIATAL HERNIA, REFLUX ESOPHAGITIS    HEMORRHOIDECTOMY      KNEE SURGERY      TUBAL ABDOMINAL LIGATION       Family History   Problem Relation Age of Onset    Diabetes Mother         Unspecified    Breast cancer Sister     Brain cancer Sister     Colon cancer Sister 62    Heart disease Sister      Heart disease Brother     Diabetes Brother         Unspecified       Home Medications:  Prior to Admission medications    Medication Sig Start Date End Date Taking? Authorizing Provider   amLODIPine (NORVASC) 5 MG tablet 1 and 1/2 tab PO QD 8/17/23   Kelsey Hoyos APRN   atorvastatin (LIPITOR) 10 MG tablet Take 1 tablet by mouth Daily. 8/17/23   Kelsey Hoyos APRN   benazepril (LOTENSIN) 40 MG tablet Take 1 tablet by mouth Daily. 8/17/23   Kelsey Hoyos APRN   Continuous Blood Gluc Sensor (FreeStyle Renae 3 Sensor) misc 1 each Every 14 (Fourteen) Days. 8/10/23   Bonnie Awad APRN   DULoxetine (CYMBALTA) 60 MG capsule  12/28/22   ProviderGia MD   glipizide (GLUCOTROL) 10 MG tablet Take 1 tablet by mouth 2 (Two) Times a Day Before Meals. 1/5/23   Kelsey Hoyos APRN   glucose blood (FREESTYLE LITE) test strip Use with glucose meter to check blood sugars twice a day (DX. E11.65)  Indications: Diabetes 7/11/23   Kelsey Hoyos APRN   HYDROcodone-acetaminophen (NORCO) 7.5-325 MG per tablet Take 1 tablet by mouth Every 12 (Twelve) Hours As Needed. 12/6/22   Gia Smith MD   insulin degludec (Tresiba FlexTouch) 100 UNIT/ML solution pen-injector injection Inject 60 Units under the skin into the appropriate area as directed Daily. 8/17/23   Kelsey Hoyos APRN   Insulin Pen Needle (Droplet Pen Needles) 32G X 5 MM misc Inject 1 each under the skin into the appropriate area as directed Daily. 1/5/23   Kelsey Hoyos APRN   metoprolol succinate XL (TOPROL-XL) 25 MG 24 hr tablet Take 1 tablet by mouth Daily. 7/24/23   Roly Torres MD   omeprazole (priLOSEC) 40 MG capsule Take 1 capsule by mouth Daily. 8/7/23   Kelsey Hoyos APRN   ondansetron (Zofran) 4 MG tablet Take 1 tablet by mouth Every 8 (Eight) Hours As Needed for Nausea or Vomiting. 8/10/23   Bonnie Awad APRN   ondansetron (Zofran) 4 MG tablet 1 po 1 hour before prep, may  "repeat in 4-6 hrs as needed for n/v 8/21/23   Caridad Rios APRN   polyethylene glycol (GoLYTELY) 236 g solution Mix solution.  Drink 2/3 of solution at 6:00 PM on the evening prior to procedure.  Drink remaining 1/3 of solution four hours prior to the scheduled arrival-time on the morning of the procedure. 8/21/23   Caridad Rios APRN   tiZANidine (ZANAFLEX) 4 MG tablet  12/28/22   Provider, MD Gia        Social History:   Social History     Tobacco Use    Smoking status: Never    Smokeless tobacco: Never   Vaping Use    Vaping Use: Never used   Substance Use Topics    Alcohol use: Never    Drug use: No         Review of Systems:  Review of Systems   Constitutional:  Negative for chills and fever.   HENT:  Negative for congestion, rhinorrhea and sore throat.    Eyes:  Negative for pain and visual disturbance.   Respiratory:  Negative for apnea, cough, chest tightness and shortness of breath.    Cardiovascular:  Negative for chest pain and palpitations.   Gastrointestinal:  Negative for abdominal pain, diarrhea, nausea and vomiting.   Genitourinary:  Negative for difficulty urinating and dysuria.   Musculoskeletal:  Negative for joint swelling and myalgias.   Skin:  Negative for color change.   Neurological:  Negative for seizures and headaches.   Psychiatric/Behavioral: Negative.     All other systems reviewed and are negative.       Physical Exam:  /57   Pulse 69   Temp 97.6 °F (36.4 °C) (Oral)   Resp 12   Ht 154.9 cm (61\")   Wt 95.2 kg (209 lb 14.1 oz)   SpO2 96%   BMI 39.66 kg/m²     Physical Exam  Vitals and nursing note reviewed.   Constitutional:       General: She is not in acute distress.     Appearance: Normal appearance. She is not toxic-appearing.   HENT:      Head: Normocephalic and atraumatic.      Jaw: There is normal jaw occlusion.   Eyes:      General: Lids are normal.      Extraocular Movements: Extraocular movements intact.      Conjunctiva/sclera: " Conjunctivae normal.      Pupils: Pupils are equal, round, and reactive to light.   Cardiovascular:      Rate and Rhythm: Normal rate and regular rhythm.      Pulses: Normal pulses.      Heart sounds: Normal heart sounds.   Pulmonary:      Effort: Pulmonary effort is normal. No respiratory distress.      Breath sounds: Normal breath sounds. No wheezing or rhonchi.   Abdominal:      General: Abdomen is flat.      Palpations: Abdomen is soft.      Tenderness: There is no abdominal tenderness. There is no guarding or rebound.   Musculoskeletal:         General: Normal range of motion.      Cervical back: Normal range of motion and neck supple.      Right lower leg: No edema.      Left lower leg: No edema.   Skin:     General: Skin is warm and dry.   Neurological:      Mental Status: She is alert and oriented to person, place, and time. Mental status is at baseline.   Psychiatric:         Mood and Affect: Mood normal.                  Procedures:  Procedures      Medical Decision Making:      Comorbidities that affect care:    Diabetes    External Notes reviewed:    Previous ED Note: Patient was seen in the emergency department for hypoglycemia      The following orders were placed and all results were independently analyzed by me:  Orders Placed This Encounter   Procedures    XR Chest 1 View    [place] Draw    Comprehensive Metabolic Panel    CBC Auto Differential    Scan Slide    Comprehensive Metabolic Panel    Lipase    Single High Sensitivity Troponin T    CK    Magnesium    Urinalysis With Culture If Indicated - Urine, Clean Catch    CBC Auto Differential    Inpatient Hospitalist Consult    POC Glucose Once    POC Glucose Once    POC Glucose Once    Insert peripheral IV    CBC & Differential    Green Top (Gel)    Lavender Top    Gold Top - SST    Light Blue Top    CBC & Differential       Medications Given in the Emergency Department:  Medications   sodium chloride 0.9 % flush 10 mL (has no administration in time  range)   sodium chloride 0.9 % bolus 1,000 mL (0 mL Intravenous Stopped 12/16/23 1127)   dextrose (D50W) (25 g/50 mL) IV injection 25 g (25 g Intravenous Given 12/16/23 1153)        ED Course:         Labs:    Lab Results (last 24 hours)       Procedure Component Value Units Date/Time    POC Glucose Once [[id number]]  (Abnormal) Collected: 12/16/23 0849    Specimen: Blood Updated: 12/16/23 0851     Glucose 137 mg/dL      Comment: Serial Number: [id number]:  [id number]       CBC & Differential [[id number]]  (Abnormal) Collected: 12/16/23 0855    Specimen: Blood Updated: 12/16/23 0930    Narrative:      The following orders were created for panel order CBC & Differential.  Procedure                               Abnormality         Status                     ---------                               -----------         ------                     CBC Auto Differential[[id number]]        Abnormal            Final result               Scan Slide[[id number]]                                       Final result                 Please view results for these tests on the individual orders.    Comprehensive Metabolic Panel [[id number]]  (Abnormal) Collected: 12/16/23 0855    Specimen: Blood Updated: 12/16/23 0931     Glucose 134 mg/dL      BUN 15 mg/dL      Creatinine 0.73 mg/dL      Sodium 141 mmol/L      Potassium 3.4 mmol/L      Chloride 103 mmol/L      CO2 27.0 mmol/L      Calcium 9.0 mg/dL      Total Protein 7.8 g/dL      Albumin 4.0 g/dL      ALT (SGPT) 16 U/L      AST (SGOT) 24 U/L      Alkaline Phosphatase 122 U/L      Total Bilirubin 0.5 mg/dL      Globulin 3.8 gm/dL      A/G Ratio 1.1 g/dL      BUN/Creatinine Ratio 20.5     Anion Gap 11.0 mmol/L      eGFR 87.5 mL/min/1.73     Narrative:      GFR Normal >60  Chronic Kidney Disease <60  Kidney Failure <15    The GFR formula is only valid for adults with stable renal function between ages 18 and 70.    CBC Auto Differential [[id number]]  (Abnormal) Collected: 12/16/23 0855     Specimen: Blood Updated: 12/16/23 0919     WBC 6.22 10*3/mm3      RBC 4.44 10*6/mm3      Hemoglobin 13.1 g/dL      Hematocrit 40.0 %      MCV 90.1 fL      MCH 29.5 pg      MCHC 32.8 g/dL      RDW 13.8 %      RDW-SD 45.6 fl      MPV 11.1 fL      Platelets 107 10*3/mm3      Neutrophil % 68.6 %      Lymphocyte % 19.5 %      Monocyte % 9.3 %      Eosinophil % 1.6 %      Basophil % 0.5 %      Immature Grans % 0.5 %      Neutrophils, Absolute 4.27 10*3/mm3      Lymphocytes, Absolute 1.21 10*3/mm3      Monocytes, Absolute 0.58 10*3/mm3      Eosinophils, Absolute 0.10 10*3/mm3      Basophils, Absolute 0.03 10*3/mm3      Immature Grans, Absolute 0.03 10*3/mm3      nRBC 0.0 /100 WBC     Scan Slide [[id number]] Collected: 12/16/23 0855    Specimen: Blood Updated: 12/16/23 0930     Anisocytosis Slight/1+     WBC Morphology Normal     Platelet Estimate Decreased    Comprehensive Metabolic Panel [[id number]]  (Abnormal) Collected: 12/16/23 0855    Specimen: Blood Updated: 12/16/23 1038     Glucose 154 mg/dL      BUN 15 mg/dL      Creatinine 0.69 mg/dL      Sodium 140 mmol/L      Potassium 3.6 mmol/L      Comment: Slight hemolysis detected by analyzer. Result may be falsely elevated.        Chloride 103 mmol/L      CO2 27.0 mmol/L      Calcium 8.8 mg/dL      Total Protein 7.4 g/dL      Albumin 3.9 g/dL      ALT (SGPT) 16 U/L      AST (SGOT) 26 U/L      Comment: Slight hemolysis detected by analyzer. Result may be falsely elevated.        Alkaline Phosphatase 122 U/L      Total Bilirubin 0.5 mg/dL      Globulin 3.5 gm/dL      A/G Ratio 1.1 g/dL      BUN/Creatinine Ratio 21.7     Anion Gap 10.0 mmol/L      eGFR 92.3 mL/min/1.73     Narrative:      GFR Normal >60  Chronic Kidney Disease <60  Kidney Failure <15    The GFR formula is only valid for adults with stable renal function between ages 18 and 70.    Lipase [[id number]]  (Normal) Collected: 12/16/23 0855    Specimen: Blood Updated: 12/16/23 1010     Lipase 25 U/L     Single  High Sensitivity Troponin T [[id number]]  (Normal) Collected: 12/16/23 0855    Specimen: Blood Updated: 12/16/23 1009     HS Troponin T 11 ng/L     Narrative:      High Sensitive Troponin T Reference Range:  <14.0 ng/L- Negative Female for AMI  <22.0 ng/L- Negative Male for AMI  >=14 - Abnormal Female indicating possible myocardial injury.  >=22 - Abnormal Male indicating possible myocardial injury.   Clinicians would have to utilize clinical acumen, EKG, Troponin, and serial changes to determine if it is an Acute Myocardial Infarction or myocardial injury due to an underlying chronic condition.         CK [[id number]]  (Normal) Collected: 12/16/23 0855    Specimen: Blood Updated: 12/16/23 1010     Creatine Kinase 117 U/L     Magnesium [[id number]]  (Normal) Collected: 12/16/23 0855    Specimen: Blood Updated: 12/16/23 1010     Magnesium 2.3 mg/dL     Urinalysis With Culture If Indicated - Urine, Clean Catch [[id number]]  (Normal) Collected: 12/16/23 0942    Specimen: Urine, Clean Catch Updated: 12/16/23 0954     Color, UA Yellow     Appearance, UA Clear     pH, UA 6.0     Specific Gravity, UA 1.009     Glucose, UA Negative     Ketones, UA Negative     Bilirubin, UA Negative     Blood, UA Negative     Protein, UA Negative     Leuk Esterase, UA Negative     Nitrite, UA Negative     Urobilinogen, UA 1.0 E.U./dL    Narrative:      In absence of clinical symptoms, the presence of pyuria, bacteria, and/or nitrites on the urinalysis result does not correlate with infection.  Urine microscopic not indicated.    POC Glucose Once [[id number]]  (Normal) Collected: 12/16/23 1126    Specimen: Blood Updated: 12/16/23 1128     Glucose 80 mg/dL      Comment: Serial Number: [id number]:  [id number]       POC Glucose Once [[id number]]  (Abnormal) Collected: 12/16/23 1252    Specimen: Blood Updated: 12/16/23 1254     Glucose 161 mg/dL      Comment: Serial Number: [id number]:  [id number]                Imaging:    XR Chest 1  View    Result Date: 12/16/2023  PROCEDURE: XR CHEST 1 VW  COMPARISON: [facility], CR, CHEST PA/AP & LAT 2V, 11/25/2020, 8:38.  [facility], CR, XR CHEST 1 VW, 6/06/2023, 2:52.  INDICATIONS: cough/ weakness  FINDINGS:  The heart is slightly enlarged.  The pulmonary vascularity does not appear congested.  The lungs are well-expanded and free of infiltrates.  Plate and screws in the lower cervical spine are consistent with anterior fusion.        Mild cardiomegaly  No active pulmonary disease is seen.       ELBA BAZZI MD       Electronically Signed and Approved By: ELBA BAZZI MD on 12/16/2023 at 10:06                Differential Diagnosis and Discussion:    Metabolic: Differential diagnosis includes but is not limited to hypertension, hyperglycemia, hyperkalemia, hypocalcemia, metabolic acidosis, hypokalemia, hypoglycemia, malnutrition, hypothyroidism, hyperthyroidism, and adrenal insufficiency.     All labs were reviewed and interpreted by me.  All X-rays impressions were independently interpreted by me.    MDM     The patient is resting comfortably and feels better, is alert, talkative and in no distress.  The patient´s CBC that was reviewed and interpreted by me shows no abnormalities of critical concern. Of note, there is no anemia requiring a blood transfusion and the platelet count is acceptable.  The patient´s CMP that was reviewed and interpretted by me shows no abnormalities of critical concern. Of note, the patient´s sodium and potassium are acceptable. The patient´s liver enzymes are unremarkable. The patient´s renal function (creatinine) is preserved. The patient has a normal anion gap.  After 4 hours repeat blood sugar is 161.  The repeat examination is unremarkable and benign. The patient is neurologically intact, has a normal mental status and is ambulatory in the ED. The history, exam, diagnostic testing in the patient's current condition do not suggest  meningitis, stroke, sepsis, subarachnoid hemorrhage, intracranial bleeding, encephalitis or other significant pathology that would warrant further testing, continued ED treatment, admission, neurological consultation, or other specialist evaluation at this point. The vital signs have been stable. The patient's condition is stable and appropriate for discharge. The patient will pursue further outpatient evaluation with the primary care physician or other designated or consulting position as indicated in the discharge instructions.    Critical Care Note: Total Critical Care time of 45 minutes. Total critical care time documented does not include time spent on separately billed procedures for services of nurses or physician assistants. I personally saw and examined the patient. I have reviewed all diagnostic interpretations and treatment plans as written. I was present for the key portions of any procedures performed and the inclusive time noted in any critical care statement. Critical care time includes patient management by me, time spent at the patients bedside,  time to review lab and imaging results, discussing patient care, documentation in the medical record, and time spent with family or caregiver.        Patient Care Considerations:    SEPSIS was considered but is NOT present in the emergency department as SIRS criteria is not present.      Consultants/Shared Management Plan:    None    Social Determinants of Health:    Patient is independent, reliable, and has access to care.       Disposition and Care Coordination:    Discharged: I considered escalation of care by admitting this patient for observation, however the patient has improved and is suitable and  stable for discharge.    I have explained the patient´s condition, diagnoses and treatment plan based on the information available to me at this time. I have answered questions and addressed any concerns. The patient has a good  understanding of the  patient´s diagnosis, condition, and treatment plan as can be expected at this point. The vital signs have been stable. The patient´s condition is stable and appropriate for discharge from the emergency department.      The patient will pursue further outpatient evaluation with the primary care physician or other designated or consulting physician as outlined in the discharge instructions. They are agreeable to this plan of care and follow-up instructions have been explained in detail. The patient has received these instructions in written format and have expressed an understanding of the discharge instructions. The patient is aware that any significant change in condition or worsening of symptoms should prompt an immediate return to this or the closest emergency department or call to 911.  I have explained discharge medications and the need for follow up with the patient/caretakers. This was also printed in the discharge instructions. Patient was discharged with the following medications and follow up:      Medication List      No changes were made to your prescriptions during this visit.      Kelsey Hoyos, APRN  [address]  [address]  [place] [address]  [phone]    In 2 days         Final diagnoses:   Hypoglycemia        ED Disposition       ED Disposition   Discharge    Condition   Stable    Comment   --               This medical record created using voice recognition software.             Arlet Dillon MD  12/16/23 2087

## 2024-01-05 DIAGNOSIS — K21.9 GASTROESOPHAGEAL REFLUX DISEASE, UNSPECIFIED WHETHER ESOPHAGITIS PRESENT: ICD-10-CM

## 2024-01-05 RX ORDER — OMEPRAZOLE 40 MG/1
40 CAPSULE, DELAYED RELEASE ORAL DAILY
Qty: 90 CAPSULE | Refills: 0 | Status: SHIPPED | OUTPATIENT
Start: 2024-01-05

## 2024-01-22 DIAGNOSIS — I10 ESSENTIAL HYPERTENSION: ICD-10-CM

## 2024-01-22 RX ORDER — AMLODIPINE BESYLATE 5 MG/1
TABLET ORAL
Qty: 135 TABLET | Refills: 0 | Status: SHIPPED | OUTPATIENT
Start: 2024-01-22

## 2024-01-22 RX ORDER — BENAZEPRIL HYDROCHLORIDE 40 MG/1
40 TABLET ORAL DAILY
Qty: 90 TABLET | Refills: 0 | Status: SHIPPED | OUTPATIENT
Start: 2024-01-22

## 2024-04-04 DIAGNOSIS — K21.9 GASTROESOPHAGEAL REFLUX DISEASE, UNSPECIFIED WHETHER ESOPHAGITIS PRESENT: ICD-10-CM

## 2024-04-04 RX ORDER — OMEPRAZOLE 40 MG/1
40 CAPSULE, DELAYED RELEASE ORAL DAILY
Qty: 30 CAPSULE | Refills: 0 | Status: SHIPPED | OUTPATIENT
Start: 2024-04-04

## 2024-04-25 DIAGNOSIS — I10 ESSENTIAL HYPERTENSION: ICD-10-CM

## 2024-04-25 RX ORDER — AMLODIPINE BESYLATE 5 MG/1
TABLET ORAL
Qty: 135 TABLET | Refills: 3 | OUTPATIENT
Start: 2024-04-25

## 2024-04-25 RX ORDER — BENAZEPRIL HYDROCHLORIDE 40 MG/1
40 TABLET ORAL DAILY
Qty: 90 TABLET | Refills: 3 | OUTPATIENT
Start: 2024-04-25

## 2024-04-25 NOTE — TELEPHONE ENCOUNTER
AMLODIPINE  LRF 01/22/2024  LOV 08/17/2023  F/U none on file     BENAZEPRIL   LRF 01/22/2024  LOV 08/17/2023  F/U none on file

## 2024-04-26 NOTE — TELEPHONE ENCOUNTER
OK FOR HUB TO READ AND SCHEDULE     Tried calling patient,Lvm to return call,Needing to schedule an appt before any medications can be refilled

## 2024-04-29 NOTE — TELEPHONE ENCOUNTER
OK FOR HUB TO RELAY AND SCHEDULE     2ND ATTEMPT     Tried calling patient, Lvm to return call,Needing to schedule an appt before any medications can be filled.

## 2024-05-01 NOTE — TELEPHONE ENCOUNTER
Patient states no longer a patient of [facility]. Patient states insurance is [employer] and she is going to new PCP.  Informed patient we are now taking her insurance if ever needing to use [facility].

## 2024-05-30 ENCOUNTER — TELEPHONE (OUTPATIENT)
Dept: GASTROENTEROLOGY | Facility: CLINIC | Age: 74
End: 2024-05-30
Payer: MEDICARE

## 2024-05-30 ENCOUNTER — OFFICE VISIT (OUTPATIENT)
Dept: ORTHOPEDIC SURGERY | Facility: CLINIC | Age: 74
End: 2024-05-30
Payer: MEDICARE

## 2024-05-30 VITALS — OXYGEN SATURATION: 94 % | BODY MASS INDEX: 37.76 KG/M2 | HEART RATE: 58 BPM | HEIGHT: 61 IN | WEIGHT: 200 LBS

## 2024-05-30 DIAGNOSIS — M17.0 OSTEOARTHRITIS OF BOTH KNEES, UNSPECIFIED OSTEOARTHRITIS TYPE: ICD-10-CM

## 2024-05-30 DIAGNOSIS — M25.561 RIGHT KNEE PAIN, UNSPECIFIED CHRONICITY: Primary | ICD-10-CM

## 2024-05-30 DIAGNOSIS — M25.562 LEFT KNEE PAIN, UNSPECIFIED CHRONICITY: ICD-10-CM

## 2024-05-30 PROCEDURE — 99203 OFFICE O/P NEW LOW 30 MIN: CPT | Performed by: ORTHOPAEDIC SURGERY

## 2024-05-30 PROCEDURE — 20610 DRAIN/INJ JOINT/BURSA W/O US: CPT | Performed by: ORTHOPAEDIC SURGERY

## 2024-05-30 RX ORDER — GABAPENTIN 100 MG/1
CAPSULE ORAL
COMMUNITY
Start: 2024-05-12

## 2024-05-30 RX ORDER — CLONAZEPAM 0.5 MG/1
TABLET ORAL
COMMUNITY
Start: 2024-05-07

## 2024-05-30 RX ADMIN — LIDOCAINE HYDROCHLORIDE 5 ML: 10 INJECTION, SOLUTION INFILTRATION; PERINEURAL at 13:25

## 2024-05-30 RX ADMIN — TRIAMCINOLONE ACETONIDE 40 MG: 40 INJECTION, SUSPENSION INTRA-ARTICULAR; INTRAMUSCULAR at 13:25

## 2024-05-30 NOTE — TELEPHONE ENCOUNTER
Sent a certified and regular letter due to no show for an appt.      Certified number: [id number] [phone]

## 2024-05-30 NOTE — PROGRESS NOTES
"Chief Complaint  Initial Evaluation and Pain of the Left Knee and Initial Evaluation and Pain of the Right Knee     Subjective      Melania Centeno presents to [facility] for initial evaluation of bilateral knees. The left knee pain is worse then the right.  She has had a scope years ago.  She has pain with prolonged standing , ambulation and stairs.  She has had no recent injury or falls.  She is very tender to touch of the left knee.  She has some swelling.     No Known Allergies     Social History     Socioeconomic History    Marital status:    Tobacco Use    Smoking status: Never    Smokeless tobacco: Never   Vaping Use    Vaping status: Never Used   Substance and Sexual Activity    Alcohol use: Never    Drug use: No    Sexual activity: Defer        I reviewed the patient's chief complaint, history of present illness, review of systems, past medical history, surgical history, family history, social history, medications, and allergy list.     Review of Systems     Constitutional: Denies fevers, chills, weight loss  Cardiovascular: Denies chest pain, shortness of breath  Skin: Denies rashes, acute skin changes  Neurologic: Denies headache, loss of consciousness        Vital Signs:   Pulse 58   Ht 154.9 cm (61\")   Wt 90.7 kg (200 lb)   SpO2 94%   BMI 37.79 kg/m²          Physical Exam  General: Alert. No acute distress    Ortho Exam        BILATERAL KNEES Flexion 95. Extension -5. Stable to varus/valgus stress. Stable to anterior/posterior drawer. Neurovascularly intact. Pain with Ashley. Negative Lachman. Positive EHL, FHL, HS and TA. Sensation intact to light touch all 5 nerves of the foot. Ambulates with Antalgic gait. Patella is well tracking. Calf supple, non-tender. Positive tenderness to the medial joint line. Positive tenderness to the lateral joint line. Positive Crepitus. Good strength to hamstrings, quadriceps, dorsiflexors, and plantar flexors.  Knee Extensor " Mechanism intact       Large Joint Arthrocentesis: L knee  Date/Time: 5/30/2024 1:25 PM  Consent given by: patient  Site marked: site marked  Timeout: Immediately prior to procedure a time out was called to verify the correct patient, procedure, equipment, support staff and site/side marked as required   Supporting Documentation  Indications: pain   Procedure Details  Location: knee - L knee  Needle gauge: 21 G.  Medications administered: 5 mL lidocaine 1 %; 40 mg triamcinolone acetonide 40 MG/ML  Patient tolerance: patient tolerated the procedure well with no immediate complications        This injection documentation was Scribed for Rajeev Yañez MD by Lay Cortés.  05/30/24   13:26 EDT    Imaging Results (Most Recent)       None             Result Review :     MRI 5/21/24 RIGHT KNEE    PROCEDURE: MRI KNEE WO CONTRAST 60628  REASON FOR EXAM: M79.606 PAIN IN LEG, UNSPECIFIED bilateral knee pain  COMPARISON: None  TECHNIQUE: Multiplanar unenhanced MR imaging of the right knee.  FINDINGS: Bone marrow: Within normal limits. There is a bone lesion involving the proximal fibula with expansion. The lesion  contains calcification is mostly cystic measuring up to 2 cm. This is felt to represent a small bone cyst versus less likely  enchondroma with associated cyst. Tricompartmental bony spurring. Soft tissues: Within normal limits. Minimal subcutaneous  edema. Joint fluid/bursa: Small joint effusion. Trace fluid in the semimembranosus bursa.  Medial compartment: Moderate to severe loss of cartilage across the weight-bearing surface of the femoral condyle and tibial  plateau. Free edge fraying/blunting of the posterior horn/body junction of medial meniscus.  Lateral compartment: Moderate joint space loss. Diffuse bony spurring. Extensive cartilage loss across the femoral condyle and  tibial plateau. Degenerative signal present in the anterior horn of the meniscus as well as likely complex tearing which is difficult  to  assess due to the compression of the meniscus..  Patellofemoral interval: Anatomically aligned but bone-on-bone articulation. Cartilage is essentially completely denuded from the  patella and trochlea. There is bony spurring. There is marrow edema and degenerative cystic change in the central anterior aspect of  the tibia.  Extensor mechanism: Intact. Cruciate ligaments: Intercondylar spurring with mass effect on the cruciate ligaments. Mild mucinous  degeneration of the ACL PCL is intact. MCL: Intact. Lateral collateral ligament complex: Intact. Posterolateral corner structures:  Intact.  IMPRESSION: 1. Advanced tricompartmental osteoarthritis with small joint effusion. 2. Bone cyst versus less likely small  enchondroma involving proximal fibula. 3. Blunting of the free edge margin of the posterior horn/body junction of medial  meniscus. 4. Likely more complex multi-directional tear of the anterior horn of lateral meniscus as described.  Interpreting Physician:MARY SCHWARTZigned: 05/21/2024 09:25 [facility]:Thank you for visiting our imaging center.    [facility] 5/21/24 MRI LEFT KNEE    PROCEDURE: MRI KNEE WO CONTRAST [id number]  REASON FOR EXAM: M79.606 PAIN IN LEG, UNSPECIFIED bilateral knee pain  COMPARISON: None  TECHNIQUE: Multiplanar unenhanced MR imaging of the left knee.  FINDINGS: Bone marrow: Within normal limits. Reactive changes related to the arthritis seen around the joints bases. Diffuse  bony spurring. Soft tissues: Within normal limits. Joint fluid/bursa: Mild joint effusion. Mild fluid distention of the  semimembranosus bursa..  Medial compartment: Joint space loss. Near bone-on-bone articulation. Cartilage is essentially denuded across the entire joint  space. Diminutive appearance of the posterior horn. Question subtle vertical tear along the undersurface of the free edge 3rd of the  posterior horn may represent a tiny radial tear is this is seen only on 1 image.  Lateral compartment: Moderate diffuse  cartilage fissuring and fraying across the entire weight-bearing femoral condyle and tibial  plateau. Moderate thinning of the nonweightbearing femoral condylar cartilage. Horizontal tearing and intrasubstance degeneration  present in the anterior horn of the meniscus. Loose body in the anterior joint compartment measuring 6 mm  Patellofemoral interval: Anatomically aligned but bone-on-bone. No normal-appearing cartilage. Reactive subchondral cystic  changes and bony spurring.  Extensor mechanism: Intact. Cruciate ligaments: PCL is intact. There is intercondylar femoral spurring with impingement upon the  ACL and PCL. The ACL is intermediate signal suggesting mucinous degeneration. MCL: Intact. Lateral collateral ligament  complex: Intact. Posterolateral corner structures: Intact.  IMPRESSION: 1. Advanced tricompartmental osteoarthritis with small joint effusion and loose body. Tiny Baker's cyst. 2.  Horizontal tear and degeneration involving the anterior horn of lateral meniscus as described. 3. Questionable radial tear along the  undersurface of the posterior horn of medial meniscus as well. 4. Likely mucinous degeneration of the ACL.       Assessment and Plan     Diagnoses and all orders for this visit:    1. Right knee pain, unspecified chronicity (Primary)    2. Left knee pain, unspecified chronicity    3. Osteoarthritis of both knees, unspecified osteoarthritis type    Other orders  -     Large Joint Arthrocentesis: L knee        Discussed the treatment plan with the patient. I reviewed the MRI results with the patient.     Discussed the risks and benefits of conservative measures.  The patient expressed understanding and wished to proceed with a left knee steroid injection.  She tolerated the injection well.     Talk to PCP regarding spinal imaging.     Will obtain standing films at the next visit.     Call or return if worsening symptoms.    Follow Up     3 weeks will discuss a right knee steroid injection.        Patient was given instructions and counseling regarding her condition or for health maintenance advice. Please see specific information pulled into the AVS if appropriate.     Transcribed for Rajeev Yañez MD by Anamika Sexton.  05/30/24   13:25 EDT    I have personally performed the services described in this document as scribed by the above individual and it is both accurate and complete. Rajeev Yañez MD 06/03/24

## 2024-06-03 RX ORDER — LIDOCAINE HYDROCHLORIDE 10 MG/ML
5 INJECTION, SOLUTION INFILTRATION; PERINEURAL
Status: COMPLETED | OUTPATIENT
Start: 2024-05-30 | End: 2024-05-30

## 2024-06-03 RX ORDER — TRIAMCINOLONE ACETONIDE 40 MG/ML
40 INJECTION, SUSPENSION INTRA-ARTICULAR; INTRAMUSCULAR
Status: COMPLETED | OUTPATIENT
Start: 2024-05-30 | End: 2024-05-30

## 2024-06-27 ENCOUNTER — OFFICE VISIT (OUTPATIENT)
Dept: ORTHOPEDIC SURGERY | Facility: CLINIC | Age: 74
End: 2024-06-27
Payer: MEDICARE

## 2024-06-27 ENCOUNTER — PREP FOR SURGERY (OUTPATIENT)
Dept: OTHER | Facility: HOSPITAL | Age: 74
End: 2024-06-27
Payer: MEDICARE

## 2024-06-27 VITALS
SYSTOLIC BLOOD PRESSURE: 167 MMHG | DIASTOLIC BLOOD PRESSURE: 79 MMHG | BODY MASS INDEX: 37.75 KG/M2 | HEART RATE: 65 BPM | HEIGHT: 61 IN | OXYGEN SATURATION: 92 % | WEIGHT: 199.96 LBS

## 2024-06-27 DIAGNOSIS — M25.562 LEFT KNEE PAIN, UNSPECIFIED CHRONICITY: Primary | ICD-10-CM

## 2024-06-27 DIAGNOSIS — M25.562 LEFT KNEE PAIN, UNSPECIFIED CHRONICITY: ICD-10-CM

## 2024-06-27 DIAGNOSIS — M17.0 OSTEOARTHRITIS OF BOTH KNEES, UNSPECIFIED OSTEOARTHRITIS TYPE: ICD-10-CM

## 2024-06-27 DIAGNOSIS — M17.0 BILATERAL PRIMARY OSTEOARTHRITIS OF KNEE: ICD-10-CM

## 2024-06-27 DIAGNOSIS — M25.561 RIGHT KNEE PAIN, UNSPECIFIED CHRONICITY: Primary | ICD-10-CM

## 2024-06-27 PROCEDURE — 3078F DIAST BP <80 MM HG: CPT

## 2024-06-27 PROCEDURE — 99214 OFFICE O/P EST MOD 30 MIN: CPT

## 2024-06-27 PROCEDURE — 3077F SYST BP >= 140 MM HG: CPT

## 2024-06-27 RX ORDER — INSULIN HUMAN 100 [IU]/ML
INJECTION, SUSPENSION SUBCUTANEOUS
COMMUNITY
Start: 2024-06-17

## 2024-06-27 RX ORDER — DEXTROMETHORPHAN HYDROBROMIDE AND PROMETHAZINE HYDROCHLORIDE 15; 6.25 MG/5ML; MG/5ML
SYRUP ORAL
COMMUNITY
Start: 2024-06-21

## 2024-06-27 RX ORDER — HYDROCODONE BITARTRATE AND ACETAMINOPHEN 10; 325 MG/1; MG/1
1 TABLET ORAL 3 TIMES DAILY
COMMUNITY
Start: 2024-06-13

## 2024-06-27 RX ORDER — BENZONATATE 200 MG/1
CAPSULE ORAL
COMMUNITY
Start: 2024-06-13

## 2024-06-27 RX ORDER — TRANEXAMIC ACID 10 MG/ML
1000 INJECTION, SOLUTION INTRAVENOUS ONCE
OUTPATIENT
Start: 2024-06-27 | End: 2024-06-27

## 2024-06-27 NOTE — PROGRESS NOTES
"Chief Complaint  Pain and Follow-up of the Left Knee and Pain and Follow-up of the Right Knee    Subjective      Melania Centeno presents to [facility] for follow up of her bilateral knees.  Patient was last seen in office on 5/30/2024 with Dr. Yañez and received a left knee steroid injection.  Patient was diagnosed with bilateral knee pain and osteoarthritis.  Patient reports that her left knee was worse than her right at that time.  Patient has had a remote history of previous left knee arthroscopy.  Patient was advised to continue her home exercises and return in 3 weeks to discuss right knee steroid injection.    Today she states, both of her knees are bad.  She denies any significant relief from the previous steroid injection.  Patient like to proceed with further discussion of surgical intervention of the left knee.  Patient denies wanting anything done of the right knee right now.    No Known Allergies    Objective     Vital Signs:   Vitals:    06/27/24 1313   BP: 167/79   Pulse: 65   SpO2: 92%   Weight: 90.7 kg (199 lb 15.3 oz)   Height: 154.9 cm (61\")     Body mass index is 37.78 kg/m².    I reviewed the patient's chief complaint, history of present illness, review of systems, past medical history, surgical history, family history, social history, medications, and allergy list.     REVIEW OF SYSTEMS    Constitutional: Denies fevers, chills, weight loss  Cardiovascular: Denies chest pain, shortness of breath  Skin: Denies rashes, acute skin changes  Neurologic: Denies headache, loss of consciousness  MSK: Bilateral knee pain.     Ortho Exam  Left lower extremity: 0 degrees knee extension.  Knee flexion 120 degrees.  Crepitus with motion.  Tender to palpation over the medial and lateral joint line.  Calf is soft, nontender.  Demonstrates active ankle dorsiflexion and plantarflexion.  Sensation intact.  Neurovascular intact.  Palpable pedal pulse.        Imaging Results (Most " Recent)       Procedure Component Value Units Date/Time    XR Knee 3 View Right [[id number]] Resulted: 06/28/24 0716     Updated: 06/28/24 0717    Narrative:      X-Ray Report:  Study: X-rays ordered, taken in the office, and reviewed today  Site: Right knee xray  Indication: Right knee pain  View: AP, lateral, sunrise right knee view(s)  Findings: Mild to moderate osteoarthritis is visualized on the medial   lateral compartments.  Severe patellofemoral arthritis is seen.    Osteophyte formation along the patella is evident.  No acute fractures.  Prior studies available for comparison: yes     XR Knee 3 View Left [[id number]] Resulted: 06/28/24 0711     Updated: 06/28/24 0713    Narrative:      X-Ray Report:  Study: X-rays ordered, taken in the office, and reviewed today  Site: Left knee xray  Indication: Left knee pain  View: AP, lateral, sunrise left knee view(s)  Findings: Severe, grade 4 bone-on-bone osteoarthritis is visualized in all   joint lines.  No acute fractures are seen. Multiple osteophyte formation  Prior studies available for comparison: yes                 Assessment and Plan   Diagnoses and all orders for this visit:    1. Right knee pain, unspecified chronicity (Primary)  -     XR Knee 3 View Right    2. Left knee pain, unspecified chronicity  -     XR Knee 3 View Left    3. Bilateral primary osteoarthritis of knee         Melania Centeno presents to [facility] for her right knee.  Patient has bilateral knee pain osteoarthritis that she is managing conservatively.  X-rays obtained and reviewed with the patient.  Patient does have significant left knee osteoarthritis.  Patient was last seen in office by Dr. Yañez on 5/30/2024 and received a left knee steroid injection.  We discussed further conservative management for her bilateral knee pain. This includes but is not limited to - oral NSAIDs, topical NSAIDs, PT/home exercise program, intermittent steroid injections,  and/or gel injections.  Patient reports that she is ready to proceed with surgical intervention of the left knee due to the severity of the pain.  We discussed left total knee arthroplasty procedure in detail.  Patient states that she is going to hold off on any further treatment on the right knee for now.    Discussed surgery with the patient. Risks/benefits discussed with patient including, but not limited to: infection, bleeding, neurovascular damage, malunion, nonunion, aesthetic deformity, need for further surgery, and death. Discussed with patient the implant type being used during surgery. Patient understands and desires to proceed. Surgery pamphlet provided to patient. Patient is to meet with our surgery scheduler at check out and proceed with scheduling of surgery.     All questions and concerns were addressed and answered. Patient left the office without any additional questions.      Patient will follow-up 2 weeks postop.    Follow Up   No follow-ups on file.  There are no Patient Instructions on file for this visit.  Patient was given instructions and counseling regarding her condition or for health maintenance advice. Please see specific information pulled into the AVS if appropriate.       Dictated Utilizing Dragon Dictation. Please note that portions of this note were completed with a voice recognition program. Part of this note may be an electronic transcription/translation of spoken language to printed text using the Dragon Dictation System.

## 2024-07-01 ENCOUNTER — TELEPHONE (OUTPATIENT)
Dept: CARDIOLOGY | Facility: CLINIC | Age: 74
End: 2024-07-01
Payer: MEDICARE

## 2024-07-01 NOTE — TELEPHONE ENCOUNTER
Caller: Melania Centeno    Relationship to patient: Self    Best call back number: [phone]     Chief complaint: PATIENT IS PREPARING FOR KNEE SURGERY AT [facility] ON 07.29.24. SHE IS NEEDING CARDIAC CLEARANCE.    Type of visit: FOLLOW UP    Requested date: ASAP      Additional notes:[facility] DOES NOT HAVE AVAILABILITY BEFORE HER SURGERY. PATIENT DOES NOT HAVE ANY SCHEDULING PREFERENCES.

## 2024-07-05 ENCOUNTER — TELEPHONE (OUTPATIENT)
Dept: ORTHOPEDIC SURGERY | Facility: CLINIC | Age: 74
End: 2024-07-05
Payer: MEDICARE

## 2024-07-05 DIAGNOSIS — M17.0 OSTEOARTHRITIS OF BOTH KNEES, UNSPECIFIED OSTEOARTHRITIS TYPE: Primary | ICD-10-CM

## 2024-07-05 NOTE — TELEPHONE ENCOUNTER
Caller: Melania Centeno    Relationship to patient: Self    Best call back number: [phone]  Patient is needing:       PATIENT IS HAVEING SX ON 07/29/2024  PATIENT NEEDS TO BE CLEARED BY A HEART DR. THE ONE PATIENT SEEN BEFORE ISNT THERE ANYMORE AND SHE DIDNT LIKE HIM.     IS THERE SOMEONE YOU COULD REFER HER TO?    PROVIDER IS DR CABRERA

## 2024-07-11 DIAGNOSIS — Z96.652 AFTERCARE FOLLOWING LEFT KNEE JOINT REPLACEMENT SURGERY: Primary | ICD-10-CM

## 2024-07-11 DIAGNOSIS — Z47.1 AFTERCARE FOLLOWING LEFT KNEE JOINT REPLACEMENT SURGERY: Primary | ICD-10-CM

## 2024-07-16 ENCOUNTER — OFFICE VISIT (OUTPATIENT)
Dept: CARDIOLOGY | Facility: CLINIC | Age: 74
End: 2024-07-16
Payer: MEDICARE

## 2024-07-16 VITALS
HEART RATE: 59 BPM | HEIGHT: 61 IN | WEIGHT: 214 LBS | SYSTOLIC BLOOD PRESSURE: 139 MMHG | BODY MASS INDEX: 40.4 KG/M2 | DIASTOLIC BLOOD PRESSURE: 80 MMHG

## 2024-07-16 DIAGNOSIS — I10 ESSENTIAL HYPERTENSION: ICD-10-CM

## 2024-07-16 DIAGNOSIS — E78.2 MIXED HYPERLIPIDEMIA: Primary | ICD-10-CM

## 2024-07-16 DIAGNOSIS — Z01.810 PRE-OPERATIVE CARDIOVASCULAR EXAMINATION: ICD-10-CM

## 2024-07-16 NOTE — PROGRESS NOTES
Chief Complaint  Hypertension, Dyslipidemia, Abnormal ECG, Chest Pain, and Abdominal Pain    Subjective        History of Present Illness  Melania Centeno presents to [facility] for follow up.   History of Present Illness  Patient is a 73-year-old female with past medical history outlined below, significant for hypertension and hyperlipidemia who presents for cardiac evaluation for upcoming knee surgery.  She feels good for the most part apart from her knee.  She denies any chest pain or discomfort.  She has no dyspnea, palpitations, dizziness, lightheadedness or syncope.  She is active at home with no exertional complaints.      Past Medical History:   Diagnosis Date    Cervical spinal stenosis 04/03/2019    Brisk reflexes suggestive of myelopathy    Cervicalgia     Diverticulitis of colon     Foot pain     Fusion of spine of cervical region 06/19/2019    GERD (gastroesophageal reflux disease)     Hyperlipidemia     Hypertension     Type 2 diabetes mellitus        ALLERGY  No Known Allergies     Past Surgical History:   Procedure Laterality Date    BREAST BIOPSY      CERVICAL FUSION  03/14/2019    right C4-5,C5-6    CHOLECYSTECTOMY      COLONOSCOPY      COLONOSCOPY N/A 8/3/2023    Procedure: COLONOSCOPY WITH POLYPECTOMIES;  Surgeon: Neal Delaney MD;  Location: [facility] END[facility];  Service: Gastroenterology;  Laterality: N/A;  COLON POLYPS, DIVERTICULOSIS    ENDOSCOPY N/A 8/3/2023    Procedure: ESOPHAGOGASTRODUODENOSCOPY WITH BIOPSIES;  Surgeon: Neal Delaney MD;  Location: [facility] ENDOSCOPY;  Service: Gastroenterology;  Laterality: N/A;  HIATAL HERNIA, REFLUX ESOPHAGITIS    HEMORRHOIDECTOMY      KNEE SURGERY      TUBAL ABDOMINAL LIGATION          Social History     Socioeconomic History    Marital status:    Tobacco Use    Smoking status: Never    Smokeless tobacco: Never   Vaping Use    Vaping status: Never Used   Substance and Sexual Activity    Alcohol use: Never     Drug use: No    Sexual activity: Defer       Family History   Problem Relation Age of Onset    Diabetes Mother         Unspecified    Breast cancer Sister     Brain cancer Sister     Colon cancer Sister 62    Heart disease Sister     Heart disease Brother     Diabetes Brother         Unspecified        Current Outpatient Medications on File Prior to Visit   Medication Sig    amLODIPine (NORVASC) 5 MG tablet TAKE 1 AND 1/2 TABLETS EVERY DAY    atorvastatin (LIPITOR) 10 MG tablet Take 1 tablet by mouth Daily.    benazepril (LOTENSIN) 40 MG tablet TAKE 1 TABLET EVERY DAY    clonazePAM (KlonoPIN) 0.5 MG tablet Take 1 tablet by mouth 3 (Three) Times a Day As Needed.    Continuous Blood Gluc Sensor (FreeStyle Renae 3 Sensor) misc 1 each Every 14 (Fourteen) Days.    DULoxetine (CYMBALTA) 60 MG capsule     gabapentin (NEURONTIN) 100 MG capsule     glipizide (GLUCOTROL) 10 MG tablet Take 1 tablet by mouth 2 (Two) Times a Day Before Meals.    glucose blood (FREESTYLE LITE) test strip Use with glucose meter to check blood sugars twice a day (DX. E11.65)  Indications: Diabetes    HumuLIN N KwikPen 100 UNIT/ML injection ADMINISTER 5 TO 15 UNITS UNDER THE SKIN THREE TIMES DAILY AS NEEDED    HYDROcodone-acetaminophen (NORCO)  MG per tablet Take 1 tablet by mouth 3 times a day.    Insulin Pen Needle (Droplet Pen Needles) 32G X 5 MM misc Inject 1 each under the skin into the appropriate area as directed Daily.    metoprolol succinate XL (TOPROL-XL) 25 MG 24 hr tablet Take 1 tablet by mouth Daily.    omeprazole (priLOSEC) 40 MG capsule Take 1 capsule by mouth Daily. APPOINTMENT NEEDED FOR ADDITIONAL REFILLS    tiZANidine (ZANAFLEX) 4 MG tablet     benzonatate (TESSALON) 200 MG capsule     insulin degludec (Tresiba FlexTouch) 100 UNIT/ML solution pen-injector injection Inject 60 Units under the skin into the appropriate area as directed Daily.    ondansetron (Zofran) 4 MG tablet Take 1 tablet by mouth Every 8 (Eight) Hours As  "Needed for Nausea or Vomiting.    ondansetron (Zofran) 4 MG tablet 1 po 1 hour before prep, may repeat in 4-6 hrs as needed for n/v    polyethylene glycol (GoLYTELY) 236 g solution Mix solution.  Drink 2/3 of solution at 6:00 PM on the evening prior to procedure.  Drink remaining 1/3 of solution four hours prior to the scheduled arrival-time on the morning of the procedure.    promethazine-dextromethorphan (PROMETHAZINE-DM) 6.25-15 MG/5ML syrup      No current facility-administered medications on file prior to visit.       Objective   Vitals:    07/16/24 1512   BP: 139/80   Pulse: 59   Weight: 97.1 kg (214 lb)   Height: 154.9 cm (61\")       Physical Exam  Constitutional:       General: She is awake. She is not in acute distress.     Appearance: Normal appearance.   HENT:      Head: Normocephalic.      Nose: Nose normal. No congestion.   Eyes:      Extraocular Movements: Extraocular movements intact.      Conjunctiva/sclera: Conjunctivae normal.      Pupils: Pupils are equal, round, and reactive to light.   Neck:      Thyroid: No thyromegaly.      Vascular: No JVD.   Cardiovascular:      Rate and Rhythm: Normal rate and regular rhythm.      Chest Wall: PMI is not displaced.      Pulses: Normal pulses.      Heart sounds: Normal heart sounds, S1 normal and S2 normal. No murmur heard.     No friction rub. No gallop. No S3 or S4 sounds.   Pulmonary:      Effort: Pulmonary effort is normal.      Breath sounds: Normal breath sounds. No wheezing, rhonchi or rales.   Abdominal:      General: Bowel sounds are normal.      Palpations: Abdomen is soft.      Tenderness: There is no abdominal tenderness.   Musculoskeletal:      Cervical back: No tenderness.      Right lower leg: No edema.      Left lower leg: No edema.   Lymphadenopathy:      Cervical: No cervical adenopathy.   Skin:     General: Skin is warm and dry.      Capillary Refill: Capillary refill takes less than 2 seconds.      Coloration: Skin is not cyanotic.      " Findings: No petechiae or rash.      Nails: There is no clubbing.   Neurological:      Mental Status: She is alert.   Psychiatric:         Mood and Affect: Mood normal.         Behavior: Behavior is cooperative.           Result Review     The following data was reviewed by CHINA Patterson on 07/16/24.      CMP          8/24/2023    17:40 12/16/2023    08:55   CMP   Glucose 118  134     154    BUN 11  15     15    Creatinine 0.70  0.73     0.69    EGFR 92.0  87.5     92.3    Sodium 139  141     140    Potassium 4.0  3.4     3.6    Chloride 100  103     103    Calcium 9.4  9.0     8.8    Total Protein 8.2  7.8     7.4    Albumin 4.5  4.0     3.9    Globulin 3.7  3.8     3.5    Total Bilirubin 0.6  0.5     0.5    Alkaline Phosphatase 168  122     122    AST (SGOT) 44  24     26    ALT (SGPT) 34  16     16    Albumin/Globulin Ratio 1.2  1.1     1.1    BUN/Creatinine Ratio 15.7  20.5     21.7    Anion Gap 13.8  11.0     10.0      CBC w/diff          8/24/2023    17:40 12/16/2023    08:55   CBC w/Diff   WBC 12.32  6.22    RBC 5.15  4.44    Hemoglobin 15.6  13.1    Hematocrit 45.7  40.0    MCV 88.7  90.1    MCH 30.3  29.5    MCHC 34.1  32.8    RDW 13.3  13.8    Platelets 141  107    Neutrophil Rel % 78.1  68.6    Immature Granulocyte Rel % 1.0  0.5    Lymphocyte Rel % 13.4  19.5    Monocyte Rel % 6.5  9.3    Eosinophil Rel % 0.4  1.6    Basophil Rel % 0.6  0.5               No results found for this or any previous visit.            ECG 12 Lead    Date/Time: 7/16/2024 3:38 PM  Performed by: Marina Hernandez APRN    Authorized by: Marina Hernandez APRN  Comparison: compared with previous ECG   Similar to previous ECG  Rhythm: sinus rhythm  Rate: normal  BPM: 58  QRS axis: normal    Clinical impression: normal ECG          Assessment & Plan  Diagnoses and all orders for this visit:    1. Mixed hyperlipidemia (Primary)    2. Essential hypertension    3. Pre-operative cardiovascular  examination      Assessment & Plan        1.  Continue the atorvastatin.  2.  Under good control on benazepril, metoprolol and amlodipine.  Continue the same.  3.  Patient EKG demonstrates sinus rhythm.  She is asymptomatic.  She is fairly active with no exertional complaints.  Her functional capacity is estimated at greater than 4 metabolic equivalents.  Feel she is low risk for perioperative cardiovascular complications and okay to proceed.            Follow Up   Return if symptoms worsen or fail to improve.    Patient was given instructions and counseling regarding her condition or for health maintenance advice. Please see specific information pulled into the AVS if appropriate.       Marina Hernandez, APRSMILEY  07/16/24  15:37 EDT    Dictated Utilizing Dragon Dictation

## 2024-07-23 ENCOUNTER — PRE-ADMISSION TESTING (OUTPATIENT)
Dept: PREADMISSION TESTING | Facility: HOSPITAL | Age: 74
End: 2024-07-23
Payer: MEDICARE

## 2024-07-23 VITALS
SYSTOLIC BLOOD PRESSURE: 134 MMHG | RESPIRATION RATE: 18 BRPM | OXYGEN SATURATION: 95 % | HEIGHT: 62 IN | HEART RATE: 64 BPM | WEIGHT: 211.2 LBS | TEMPERATURE: 97.4 F | DIASTOLIC BLOOD PRESSURE: 78 MMHG | BODY MASS INDEX: 38.87 KG/M2

## 2024-07-23 DIAGNOSIS — M25.562 LEFT KNEE PAIN, UNSPECIFIED CHRONICITY: ICD-10-CM

## 2024-07-23 DIAGNOSIS — M17.0 OSTEOARTHRITIS OF BOTH KNEES, UNSPECIFIED OSTEOARTHRITIS TYPE: ICD-10-CM

## 2024-07-23 LAB
ALBUMIN SERPL-MCNC: 3.9 G/DL (ref 3.5–5.2)
ALBUMIN/GLOB SERPL: 1 G/DL
ALP SERPL-CCNC: 171 U/L (ref 39–117)
ALT SERPL W P-5'-P-CCNC: 20 U/L (ref 1–33)
ANION GAP SERPL CALCULATED.3IONS-SCNC: 9.4 MMOL/L (ref 5–15)
AST SERPL-CCNC: 23 U/L (ref 1–32)
BASOPHILS # BLD AUTO: 0.05 10*3/MM3 (ref 0–0.2)
BASOPHILS NFR BLD AUTO: 0.7 % (ref 0–1.5)
BILIRUB SERPL-MCNC: 0.8 MG/DL (ref 0–1.2)
BUN SERPL-MCNC: 18 MG/DL (ref 8–23)
BUN/CREAT SERPL: 22.5 (ref 7–25)
CALCIUM SPEC-SCNC: 9.5 MG/DL (ref 8.6–10.5)
CHLORIDE SERPL-SCNC: 98 MMOL/L (ref 98–107)
CO2 SERPL-SCNC: 27.6 MMOL/L (ref 22–29)
CREAT SERPL-MCNC: 0.8 MG/DL (ref 0.57–1)
DEPRECATED RDW RBC AUTO: 44.6 FL (ref 37–54)
EGFRCR SERPLBLD CKD-EPI 2021: 78.4 ML/MIN/1.73
EOSINOPHIL # BLD AUTO: 0.13 10*3/MM3 (ref 0–0.4)
EOSINOPHIL NFR BLD AUTO: 1.7 % (ref 0.3–6.2)
ERYTHROCYTE [DISTWIDTH] IN BLOOD BY AUTOMATED COUNT: 13.8 % (ref 12.3–15.4)
GLOBULIN UR ELPH-MCNC: 3.9 GM/DL
GLUCOSE SERPL-MCNC: 242 MG/DL (ref 65–99)
HBA1C MFR BLD: 9 % (ref 4.8–5.6)
HCT VFR BLD AUTO: 44.3 % (ref 34–46.6)
HGB BLD-MCNC: 14.9 G/DL (ref 12–15.9)
IMM GRANULOCYTES # BLD AUTO: 0.02 10*3/MM3 (ref 0–0.05)
IMM GRANULOCYTES NFR BLD AUTO: 0.3 % (ref 0–0.5)
LYMPHOCYTES # BLD AUTO: 2.87 10*3/MM3 (ref 0.7–3.1)
LYMPHOCYTES NFR BLD AUTO: 38 % (ref 19.6–45.3)
MCH RBC QN AUTO: 29.7 PG (ref 26.6–33)
MCHC RBC AUTO-ENTMCNC: 33.6 G/DL (ref 31.5–35.7)
MCV RBC AUTO: 88.2 FL (ref 79–97)
MONOCYTES # BLD AUTO: 0.7 10*3/MM3 (ref 0.1–0.9)
MONOCYTES NFR BLD AUTO: 9.3 % (ref 5–12)
NEUTROPHILS NFR BLD AUTO: 3.79 10*3/MM3 (ref 1.7–7)
NEUTROPHILS NFR BLD AUTO: 50 % (ref 42.7–76)
NRBC BLD AUTO-RTO: 0 /100 WBC (ref 0–0.2)
PLATELET # BLD AUTO: 127 10*3/MM3 (ref 140–450)
PMV BLD AUTO: 11.6 FL (ref 6–12)
POTASSIUM SERPL-SCNC: 4.7 MMOL/L (ref 3.5–5.2)
PROT SERPL-MCNC: 7.8 G/DL (ref 6–8.5)
RBC # BLD AUTO: 5.02 10*6/MM3 (ref 3.77–5.28)
SODIUM SERPL-SCNC: 135 MMOL/L (ref 136–145)
WBC NRBC COR # BLD AUTO: 7.56 10*3/MM3 (ref 3.4–10.8)

## 2024-07-23 PROCEDURE — 83036 HEMOGLOBIN GLYCOSYLATED A1C: CPT

## 2024-07-23 PROCEDURE — 85025 COMPLETE CBC W/AUTO DIFF WBC: CPT

## 2024-07-23 PROCEDURE — 36415 COLL VENOUS BLD VENIPUNCTURE: CPT

## 2024-07-23 PROCEDURE — 80053 COMPREHEN METABOLIC PANEL: CPT

## 2024-07-23 RX ORDER — AMLODIPINE BESYLATE 10 MG/1
10 TABLET ORAL DAILY
Status: ON HOLD | COMMUNITY

## 2024-07-23 RX ORDER — BENZONATATE 200 MG/1
200 CAPSULE ORAL 3 TIMES DAILY PRN
Status: ON HOLD | COMMUNITY

## 2024-07-23 NOTE — DISCHARGE INSTRUCTIONS
IMPORTANT INSTRUCTIONS - PRE-ADMISSION TESTING  DO NOT EAT OR CHEW anything after midnight the night before your procedure.    You may have CLEAR liquids up to 3_ hours prior to ARRIVAL time.   Take the following medications the morning of your procedure with JUST A SIP OF WATER: METOPROLOL, TIZANIDINE, DULOXETINE,  GABAPENTIN IF NEEDED, AMLODIPINE, NORCO IF NEEDED,  ATORVASTATIN, CLONAZEPAM, OMEPRAZOLE  DO NOT TAKE INSULIN AFTER SUPPER THE EVENING PRIOR TO PROCEDURE    DO NOT BRING your medications to the hospital with you, UNLESS something has changed since your PRE-Admission Testing appointment.  Hold all vitamins, supplements, and NSAIDS (Non- steroidal anti-inflammatory meds) for one week prior to surgery (you MAY take Tylenol or Acetaminophen).  If you are diabetic, check your blood sugar the morning of your procedure. If it is less than 70 or if you are feeling symptomatic, call the following number for further instructions: [phone] SAME  DAY SURGERY_.  Use your inhalers/nebulizers as usual, the morning of your procedure. BRING YOUR INHALERS with you.   Bring your CPAP or BIPAP to hospital, ONLY IF YOU WILL BE SPENDING THE NIGHT.   Make sure you have a ride home and have someone who will stay with you the day of your procedure after you go home.  If you have any questions, please call your Pre-Admission Testing NurseCRISTOPHER_ at [phone]_.   Per anesthesia request, do not smoke for 24 hours before your procedure or as instructed by your surgeon.    Clear Liquid Diet        Find out when you need to start a clear liquid diet.   Think of “clear liquids” as anything you could read a newspaper through. This includes things like water, broth, sports drinks, or tea WITHOUT any kind of milk or cream.           Once you are told to start a clear liquid diet, only drink these things until 3 hours before arrival to the hospital or when the hospital says to stop. Total volume limitation: 8 oz.       Clear  liquids you CAN drink:   Water   Clear broth: beef, chicken, vegetable, or bone broth with nothing in it   Gatorade   Lemonade or Kade-aid   Soda   Tea, coffee (NO cream or honey)   Jell-O (without fruit)   Popsicles (without fruit or cream)   Italian ices   Juice without pulp: apple, white, grape   You may use salt, pepper, and sugar    Do NOT drink:   Milk or cream   Soy milk, almond milk, coconut milk, or other non-dairy drinks and   creamers   Milkshakes or smoothies   Tomato juice   Orange juice   Grapefruit juice   Cream soups or any other than broth         Clear Liquid Diet:  Do NOT eat any solid food.  Do NOT eat or suck on mints or candy.  Do NOT chew gum.  Do NOT drink thick liquids like milk or juice with pulp in it.  Do NOT add milk, cream, or anything like soy milk or almond milk to coffee or tea.   PREOPERATIVE (BEFORE SURGERY)              BATHING INSTRUCTIONS  Instructions:    You will need to shower  3 separate times utilizing the soap provided; at the times indicated   below:     - 7/27 PM   - 7/28 AM    - 7/28 PM      Wash your hair and face with normal shampoo and soap, rinse it well before using the surgical soap.      In the shower, wet the skin completely with water from your neck to your feet. Apply the cleanser to your   body ONLY FROM THE NECK TO YOUR FEET.     Do NOT USE THE CLEANSER ON YOUR FACE, HEAD, OR GENITAL (PRIVATE) AREAS.   Keep it out of your eyes, ears, and mouth because of the risk of injury to those areas.      Scrub with a clean washcloth for each bath utilizing the soap provided from the top of your body to the   bottom starting at the neck area.      Pay close attention to your armpits, groin area, and the site of surgery.      Wash your body gently for 5 minutes. Stand outside the stream or turn off the water while scrubbing your   body. Do NOT wash with your regular soap after the surgical cleanser is used.      RINSE THE CLEANSER OFF COMPLETELY with plenty of water.  Rinse the area again thoroughly.      Dry off with a clean towel. The surgical soap can cause dryness; however do NOT APPLY LOTION,   CREAM, POWDER, and/or DEODORANT AFTER SHOWERING.     Be sure to where clean clothes after showering.      Ensure CLEAN BED LINENS AFTER FIRST wash with the surgical soap.      NO PETS ALLOWED IN THE BED with you after utilizing the surgical soap.

## 2024-07-24 ENCOUNTER — ANESTHESIA EVENT (OUTPATIENT)
Dept: PERIOP | Facility: HOSPITAL | Age: 74
End: 2024-07-24
Payer: MEDICARE

## 2024-07-25 ENCOUNTER — TELEPHONE (OUTPATIENT)
Dept: ORTHOPEDIC SURGERY | Facility: CLINIC | Age: 74
End: 2024-07-25
Payer: MEDICARE

## 2024-07-25 NOTE — TELEPHONE ENCOUNTER
SPOKE WITH PATIENT ABOUT ELEVATED A1C. DR. CABRERA IS OKAY TO PROCEED WITH SURGERY, HOWEVER PATIENT NEEDS TO MONITOR HER SUGAR AND BE SURE TO TAKE HER MEDICATIONS AS DIRECTED OVER THE NEXT FEW MONTHS. EDUCATED PATIENT ON HOW HIGH AND UNCONTROLLED GLUCOSE LEVELS AFFECT THE HEALING PROCESS. SHE VOICED UNDERSTANDING AND STATES SHE JUST STARTED A DIFFERENT INSULIN 2 WEEKS AGO.

## 2024-07-27 NOTE — H&P
Chief Complaint  No chief complaint on file.    Subjective      Melania Centeno presents [facility] up of her bilateral knees.  Patient was last seen in office on 5/30/2024 with Dr. Yañez and received a left knee steroid injection.  Patient was diagnosed with bilateral knee pain and osteoarthritis.  Patient reports that her left knee was worse than her right at that time.  Patient has had a remote history of previous left knee arthroscopy.  Patient was advised to continue her home exercises and return in 3 weeks to discuss right knee steroid injection.    Today she states, both of her knees are bad.  She denies any significant relief from the previous steroid injection.  Patient like to proceed with further discussion of surgical intervention of the left knee.  Patient denies wanting anything done of the right knee right now.    No Known Allergies    Objective     Vital Signs:   There were no vitals filed for this visit.    There is no height or weight on file to calculate BMI.    I reviewed the patient's chief complaint, history of present illness, review of systems, past medical history, surgical history, family history, social history, medications, and allergy list.     REVIEW OF SYSTEMS    Constitutional: Denies fevers, chills, weight loss  Cardiovascular: Denies chest pain, shortness of breath  Skin: Denies rashes, acute skin changes  Neurologic: Denies headache, loss of consciousness  MSK: Bilateral knee pain.     Ortho Exam  Left lower extremity: 0 degrees knee extension.  Knee flexion 120 degrees.  Crepitus with motion.  Tender to palpation over the medial and lateral joint line.  Calf is soft, nontender.  Demonstrates active ankle dorsiflexion and plantarflexion.  Sensation intact.  Neurovascular intact.  Palpable pedal pulse.        Imaging Results (Most Recent)       None                Assessment and Plan   There are no diagnoses linked to this encounter.       Melania  Taryn presents to [facility] for her right knee.  Patient has bilateral knee pain osteoarthritis that she is managing conservatively.  X-rays obtained and reviewed with the patient.  Patient does have significant left knee osteoarthritis.  Patient was last seen in office by Dr. Yañez on 5/30/2024 and received a left knee steroid injection.  We discussed further conservative management for her bilateral knee pain. This includes but is not limited to - oral NSAIDs, topical NSAIDs, PT/home exercise program, intermittent steroid injections, and/or gel injections.  Patient reports that she is ready to proceed with surgical intervention of the left knee due to the severity of the pain.  We discussed left total knee arthroplasty procedure in detail.  Patient states that she is going to hold off on any further treatment on the right knee for now.    Discussed surgery with the patient. Risks/benefits discussed with patient including, but not limited to: infection, bleeding, neurovascular damage, malunion, nonunion, aesthetic deformity, need for further surgery, and death. Discussed with patient the implant type being used during surgery. Patient understands and desires to proceed. Surgery pamphlet provided to patient. Patient is to meet with our surgery scheduler at check out and proceed with scheduling of surgery.     All questions and concerns were addressed and answered. Patient left the office without any additional questions.      Patient will follow-up 2 weeks postop.    Follow Up     Discussed surgery with the patient. Risks/benefits discussed with patient including, but not limited to: infection, bleeding, neurovascular damage, malunion, nonunion, aesthetic deformity, need for further surgery, and death. Discussed with patient the implant type being used during surgery. Patient understands and desires to proceed. Surgery pamphlet provided to patient. Patient is to meet with our  surgery scheduler at check out and proceed with scheduling of surgery.     All questions and concerns were addressed and answered. Patient left the office without any additional questions.      Patient will follow-up 2 weeks postop.

## 2024-07-29 ENCOUNTER — ANESTHESIA EVENT CONVERTED (OUTPATIENT)
Dept: ANESTHESIOLOGY | Facility: HOSPITAL | Age: 74
End: 2024-07-29
Payer: MEDICARE

## 2024-07-29 ENCOUNTER — APPOINTMENT (OUTPATIENT)
Dept: GENERAL RADIOLOGY | Facility: HOSPITAL | Age: 74
End: 2024-07-29
Payer: MEDICARE

## 2024-07-29 ENCOUNTER — ANESTHESIA (OUTPATIENT)
Dept: PERIOP | Facility: HOSPITAL | Age: 74
End: 2024-07-29
Payer: MEDICARE

## 2024-07-29 ENCOUNTER — HOSPITAL ENCOUNTER (OUTPATIENT)
Facility: HOSPITAL | Age: 74
Discharge: HOME HEALTH | End: 2024-07-30
Attending: ORTHOPAEDIC SURGERY | Admitting: ORTHOPAEDIC SURGERY
Payer: MEDICARE

## 2024-07-29 DIAGNOSIS — M17.0 OSTEOARTHRITIS OF BOTH KNEES, UNSPECIFIED OSTEOARTHRITIS TYPE: ICD-10-CM

## 2024-07-29 DIAGNOSIS — R26.2 DIFFICULTY IN WALKING: Primary | ICD-10-CM

## 2024-07-29 DIAGNOSIS — Z78.9 DECREASED ACTIVITIES OF DAILY LIVING (ADL): ICD-10-CM

## 2024-07-29 DIAGNOSIS — M25.562 LEFT KNEE PAIN, UNSPECIFIED CHRONICITY: ICD-10-CM

## 2024-07-29 PROBLEM — Z96.652 S/P TKR (TOTAL KNEE REPLACEMENT), LEFT: Status: ACTIVE | Noted: 2024-07-29

## 2024-07-29 LAB
GLUCOSE BLDC GLUCOMTR-MCNC: 299 MG/DL (ref 70–99)
GLUCOSE BLDC GLUCOMTR-MCNC: 315 MG/DL (ref 70–99)

## 2024-07-29 PROCEDURE — 25810000003 LACTATED RINGERS PER 1000 ML: Performed by: ANESTHESIOLOGY

## 2024-07-29 PROCEDURE — 25010000002 ROPIVACAINE PER 1 MG: Performed by: ORTHOPAEDIC SURGERY

## 2024-07-29 PROCEDURE — 63710000001 FAMOTIDINE 20 MG TABLET: Performed by: ORTHOPAEDIC SURGERY

## 2024-07-29 PROCEDURE — 25010000002 EPINEPHRINE 1 MG/ML SOLUTION: Performed by: ANESTHESIOLOGY

## 2024-07-29 PROCEDURE — 94761 N-INVAS EAR/PLS OXIMETRY MLT: CPT

## 2024-07-29 PROCEDURE — 25010000002 ONDANSETRON PER 1 MG: Performed by: NURSE ANESTHETIST, CERTIFIED REGISTERED

## 2024-07-29 PROCEDURE — 25010000002 DEXAMETHASONE PER 1 MG: Performed by: NURSE ANESTHETIST, CERTIFIED REGISTERED

## 2024-07-29 PROCEDURE — 63710000001 INSULIN LISPRO (HUMAN) PER 5 UNITS: Performed by: NURSE PRACTITIONER

## 2024-07-29 PROCEDURE — 94799 UNLISTED PULMONARY SVC/PX: CPT

## 2024-07-29 PROCEDURE — 25010000002 ROPIVACAINE PER 1 MG: Performed by: ANESTHESIOLOGY

## 2024-07-29 PROCEDURE — 25010000002 MORPHINE PER 10 MG: Performed by: ORTHOPAEDIC SURGERY

## 2024-07-29 PROCEDURE — 25010000002 MIDAZOLAM PER 1MG: Performed by: ANESTHESIOLOGY

## 2024-07-29 PROCEDURE — 63710000001 SENNOSIDES-DOCUSATE 8.6-50 MG TABLET: Performed by: ORTHOPAEDIC SURGERY

## 2024-07-29 PROCEDURE — 73560 X-RAY EXAM OF KNEE 1 OR 2: CPT

## 2024-07-29 PROCEDURE — 25010000002 ONDANSETRON PER 1 MG: Performed by: ORTHOPAEDIC SURGERY

## 2024-07-29 PROCEDURE — 25010000002 KETOROLAC TROMETHAMINE PER 15 MG: Performed by: ORTHOPAEDIC SURGERY

## 2024-07-29 PROCEDURE — 82948 REAGENT STRIP/BLOOD GLUCOSE: CPT

## 2024-07-29 PROCEDURE — 63710000001 OXYCODONE-ACETAMINOPHEN 7.5-325 MG TABLET: Performed by: ORTHOPAEDIC SURGERY

## 2024-07-29 PROCEDURE — C1713 ANCHOR/SCREW BN/BN,TIS/BN: HCPCS | Performed by: ORTHOPAEDIC SURGERY

## 2024-07-29 PROCEDURE — C1776 JOINT DEVICE (IMPLANTABLE): HCPCS | Performed by: ORTHOPAEDIC SURGERY

## 2024-07-29 PROCEDURE — 82948 REAGENT STRIP/BLOOD GLUCOSE: CPT | Performed by: NURSE PRACTITIONER

## 2024-07-29 PROCEDURE — 25810000003 LACTATED RINGERS PER 1000 ML: Performed by: ORTHOPAEDIC SURGERY

## 2024-07-29 PROCEDURE — A9270 NON-COVERED ITEM OR SERVICE: HCPCS | Performed by: ORTHOPAEDIC SURGERY

## 2024-07-29 PROCEDURE — 25010000002 CEFAZOLIN PER 500 MG

## 2024-07-29 PROCEDURE — 63710000001 ACETAMINOPHEN EXTRA STRENGTH 500 MG TABLET: Performed by: ORTHOPAEDIC SURGERY

## 2024-07-29 PROCEDURE — 25010000002 CEFAZOLIN PER 500 MG: Performed by: ORTHOPAEDIC SURGERY

## 2024-07-29 PROCEDURE — 27447 TOTAL KNEE ARTHROPLASTY: CPT | Performed by: ORTHOPAEDIC SURGERY

## 2024-07-29 PROCEDURE — A9270 NON-COVERED ITEM OR SERVICE: HCPCS | Performed by: NURSE PRACTITIONER

## 2024-07-29 PROCEDURE — 25010000002 EPINEPHRINE 1 MG/ML SOLUTION: Performed by: ORTHOPAEDIC SURGERY

## 2024-07-29 PROCEDURE — 25010000002 HYDROMORPHONE 1 MG/ML SOLUTION: Performed by: NURSE ANESTHETIST, CERTIFIED REGISTERED

## 2024-07-29 PROCEDURE — 25010000002 FENTANYL CITRATE (PF) 50 MCG/ML SOLUTION: Performed by: NURSE ANESTHETIST, CERTIFIED REGISTERED

## 2024-07-29 PROCEDURE — 97161 PT EVAL LOW COMPLEX 20 MIN: CPT

## 2024-07-29 PROCEDURE — 25010000002 PROPOFOL 10 MG/ML EMULSION: Performed by: NURSE ANESTHETIST, CERTIFIED REGISTERED

## 2024-07-29 PROCEDURE — 25010000002 SUGAMMADEX 200 MG/2ML SOLUTION: Performed by: NURSE ANESTHETIST, CERTIFIED REGISTERED

## 2024-07-29 DEVICE — PAT 3PEG STD 8X28MM: Type: IMPLANTABLE DEVICE | Site: KNEE | Status: FUNCTIONAL

## 2024-07-29 DEVICE — CMT BONE PALACOS R HI/VISC 1X40: Type: IMPLANTABLE DEVICE | Site: KNEE | Status: FUNCTIONAL

## 2024-07-29 DEVICE — COMP FEM/KN VANGUARD INTLK CR 65MM NS LT: Type: IMPLANTABLE DEVICE | Site: KNEE | Status: FUNCTIONAL

## 2024-07-29 DEVICE — CAP TOTL KN CMT PRIMARY: Type: IMPLANTABLE DEVICE | Site: KNEE | Status: FUNCTIONAL

## 2024-07-29 DEVICE — BEAR TIB/KN VANGUARD AS 14X71MM NS: Type: IMPLANTABLE DEVICE | Site: KNEE | Status: FUNCTIONAL

## 2024-07-29 DEVICE — TRY TIB CRUC 71MM: Type: IMPLANTABLE DEVICE | Site: KNEE | Status: FUNCTIONAL

## 2024-07-29 RX ORDER — ONDANSETRON 2 MG/ML
INJECTION INTRAMUSCULAR; INTRAVENOUS AS NEEDED
Status: DISCONTINUED | OUTPATIENT
Start: 2024-07-29 | End: 2024-07-29 | Stop reason: SURG

## 2024-07-29 RX ORDER — ONDANSETRON 2 MG/ML
4 INJECTION INTRAMUSCULAR; INTRAVENOUS EVERY 6 HOURS PRN
Status: DISCONTINUED | OUTPATIENT
Start: 2024-07-29 | End: 2024-07-30 | Stop reason: HOSPADM

## 2024-07-29 RX ORDER — INSULIN LISPRO 100 [IU]/ML
3-14 INJECTION, SOLUTION INTRAVENOUS; SUBCUTANEOUS
Status: DISCONTINUED | OUTPATIENT
Start: 2024-07-29 | End: 2024-07-30 | Stop reason: HOSPADM

## 2024-07-29 RX ORDER — SODIUM CHLORIDE, SODIUM LACTATE, POTASSIUM CHLORIDE, CALCIUM CHLORIDE 600; 310; 30; 20 MG/100ML; MG/100ML; MG/100ML; MG/100ML
9 INJECTION, SOLUTION INTRAVENOUS CONTINUOUS PRN
Status: DISCONTINUED | OUTPATIENT
Start: 2024-07-29 | End: 2024-07-30 | Stop reason: HOSPADM

## 2024-07-29 RX ORDER — SODIUM CHLORIDE, SODIUM LACTATE, POTASSIUM CHLORIDE, CALCIUM CHLORIDE 600; 310; 30; 20 MG/100ML; MG/100ML; MG/100ML; MG/100ML
100 INJECTION, SOLUTION INTRAVENOUS CONTINUOUS
Status: DISCONTINUED | OUTPATIENT
Start: 2024-07-29 | End: 2024-07-30 | Stop reason: HOSPADM

## 2024-07-29 RX ORDER — IBUPROFEN 600 MG/1
1 TABLET ORAL
Status: DISCONTINUED | OUTPATIENT
Start: 2024-07-29 | End: 2024-07-30 | Stop reason: HOSPADM

## 2024-07-29 RX ORDER — NALOXONE HCL 0.4 MG/ML
0.4 VIAL (ML) INJECTION
Status: DISCONTINUED | OUTPATIENT
Start: 2024-07-29 | End: 2024-07-30 | Stop reason: HOSPADM

## 2024-07-29 RX ORDER — AMOXICILLIN 250 MG
2 CAPSULE ORAL 2 TIMES DAILY
Status: DISCONTINUED | OUTPATIENT
Start: 2024-07-29 | End: 2024-07-30 | Stop reason: HOSPADM

## 2024-07-29 RX ORDER — PROMETHAZINE HYDROCHLORIDE 12.5 MG/1
25 TABLET ORAL ONCE AS NEEDED
Status: DISCONTINUED | OUTPATIENT
Start: 2024-07-29 | End: 2024-07-29 | Stop reason: HOSPADM

## 2024-07-29 RX ORDER — FAMOTIDINE 20 MG/1
40 TABLET, FILM COATED ORAL DAILY
Status: DISCONTINUED | OUTPATIENT
Start: 2024-07-29 | End: 2024-07-30 | Stop reason: HOSPADM

## 2024-07-29 RX ORDER — SODIUM CHLORIDE 0.9 % (FLUSH) 0.9 %
10 SYRINGE (ML) INJECTION AS NEEDED
Status: DISCONTINUED | OUTPATIENT
Start: 2024-07-29 | End: 2024-07-29 | Stop reason: HOSPADM

## 2024-07-29 RX ORDER — ROPIVACAINE HYDROCHLORIDE 5 MG/ML
INJECTION, SOLUTION EPIDURAL; INFILTRATION; PERINEURAL
Status: COMPLETED | OUTPATIENT
Start: 2024-07-29 | End: 2024-07-29

## 2024-07-29 RX ORDER — ONDANSETRON 4 MG/1
4 TABLET, ORALLY DISINTEGRATING ORAL EVERY 6 HOURS PRN
Status: DISCONTINUED | OUTPATIENT
Start: 2024-07-29 | End: 2024-07-30 | Stop reason: HOSPADM

## 2024-07-29 RX ORDER — KETOROLAC TROMETHAMINE 15 MG/ML
15 INJECTION, SOLUTION INTRAMUSCULAR; INTRAVENOUS EVERY 6 HOURS SCHEDULED
Status: DISCONTINUED | OUTPATIENT
Start: 2024-07-29 | End: 2024-07-30 | Stop reason: HOSPADM

## 2024-07-29 RX ORDER — DEXTROSE MONOHYDRATE 25 G/50ML
25 INJECTION, SOLUTION INTRAVENOUS
Status: DISCONTINUED | OUTPATIENT
Start: 2024-07-29 | End: 2024-07-30 | Stop reason: HOSPADM

## 2024-07-29 RX ORDER — PROMETHAZINE HYDROCHLORIDE 25 MG/1
25 SUPPOSITORY RECTAL ONCE AS NEEDED
Status: DISCONTINUED | OUTPATIENT
Start: 2024-07-29 | End: 2024-07-29 | Stop reason: HOSPADM

## 2024-07-29 RX ORDER — OXYCODONE AND ACETAMINOPHEN 7.5; 325 MG/1; MG/1
2 TABLET ORAL EVERY 4 HOURS PRN
Status: DISCONTINUED | OUTPATIENT
Start: 2024-07-29 | End: 2024-07-30 | Stop reason: HOSPADM

## 2024-07-29 RX ORDER — OXYCODONE HYDROCHLORIDE 5 MG/1
5 TABLET ORAL
Status: DISCONTINUED | OUTPATIENT
Start: 2024-07-29 | End: 2024-07-29 | Stop reason: HOSPADM

## 2024-07-29 RX ORDER — ACETAMINOPHEN 500 MG
1000 TABLET ORAL ONCE
Status: COMPLETED | OUTPATIENT
Start: 2024-07-29 | End: 2024-07-29

## 2024-07-29 RX ORDER — TRANEXAMIC ACID 10 MG/ML
1000 INJECTION, SOLUTION INTRAVENOUS ONCE
Status: COMPLETED | OUTPATIENT
Start: 2024-07-29 | End: 2024-07-29

## 2024-07-29 RX ORDER — ONDANSETRON 2 MG/ML
4 INJECTION INTRAMUSCULAR; INTRAVENOUS ONCE AS NEEDED
Status: DISCONTINUED | OUTPATIENT
Start: 2024-07-29 | End: 2024-07-29 | Stop reason: HOSPADM

## 2024-07-29 RX ORDER — NICOTINE POLACRILEX 4 MG
15 LOZENGE BUCCAL
Status: DISCONTINUED | OUTPATIENT
Start: 2024-07-29 | End: 2024-07-30 | Stop reason: HOSPADM

## 2024-07-29 RX ORDER — SODIUM CHLORIDE 9 MG/ML
40 INJECTION, SOLUTION INTRAVENOUS AS NEEDED
Status: DISCONTINUED | OUTPATIENT
Start: 2024-07-29 | End: 2024-07-29 | Stop reason: HOSPADM

## 2024-07-29 RX ORDER — LIDOCAINE HYDROCHLORIDE 20 MG/ML
INJECTION, SOLUTION EPIDURAL; INFILTRATION; INTRACAUDAL; PERINEURAL AS NEEDED
Status: DISCONTINUED | OUTPATIENT
Start: 2024-07-29 | End: 2024-07-29 | Stop reason: SURG

## 2024-07-29 RX ORDER — DEXAMETHASONE SODIUM PHOSPHATE 4 MG/ML
INJECTION, SOLUTION INTRA-ARTICULAR; INTRALESIONAL; INTRAMUSCULAR; INTRAVENOUS; SOFT TISSUE AS NEEDED
Status: DISCONTINUED | OUTPATIENT
Start: 2024-07-29 | End: 2024-07-29 | Stop reason: SURG

## 2024-07-29 RX ORDER — ENOXAPARIN SODIUM 100 MG/ML
40 INJECTION SUBCUTANEOUS DAILY
Status: DISCONTINUED | OUTPATIENT
Start: 2024-07-30 | End: 2024-07-30 | Stop reason: HOSPADM

## 2024-07-29 RX ORDER — OXYCODONE AND ACETAMINOPHEN 7.5; 325 MG/1; MG/1
1 TABLET ORAL EVERY 4 HOURS PRN
Status: DISCONTINUED | OUTPATIENT
Start: 2024-07-29 | End: 2024-07-30 | Stop reason: HOSPADM

## 2024-07-29 RX ORDER — MAGNESIUM HYDROXIDE 1200 MG/15ML
LIQUID ORAL AS NEEDED
Status: DISCONTINUED | OUTPATIENT
Start: 2024-07-29 | End: 2024-07-29 | Stop reason: HOSPADM

## 2024-07-29 RX ORDER — ROCURONIUM BROMIDE 10 MG/ML
INJECTION, SOLUTION INTRAVENOUS AS NEEDED
Status: DISCONTINUED | OUTPATIENT
Start: 2024-07-29 | End: 2024-07-29 | Stop reason: SURG

## 2024-07-29 RX ORDER — CELECOXIB 100 MG/1
200 CAPSULE ORAL ONCE
Status: COMPLETED | OUTPATIENT
Start: 2024-07-29 | End: 2024-07-29

## 2024-07-29 RX ORDER — MEPERIDINE HYDROCHLORIDE 25 MG/ML
12.5 INJECTION INTRAMUSCULAR; INTRAVENOUS; SUBCUTANEOUS
Status: DISCONTINUED | OUTPATIENT
Start: 2024-07-29 | End: 2024-07-29 | Stop reason: HOSPADM

## 2024-07-29 RX ORDER — ACETAMINOPHEN 500 MG
1000 TABLET ORAL EVERY 8 HOURS
Status: DISCONTINUED | OUTPATIENT
Start: 2024-07-29 | End: 2024-07-30 | Stop reason: HOSPADM

## 2024-07-29 RX ORDER — FENTANYL CITRATE 50 UG/ML
INJECTION, SOLUTION INTRAMUSCULAR; INTRAVENOUS AS NEEDED
Status: DISCONTINUED | OUTPATIENT
Start: 2024-07-29 | End: 2024-07-29 | Stop reason: SURG

## 2024-07-29 RX ORDER — FERROUS SULFATE 325(65) MG
325 TABLET ORAL
Status: DISCONTINUED | OUTPATIENT
Start: 2024-07-30 | End: 2024-07-30 | Stop reason: HOSPADM

## 2024-07-29 RX ORDER — MIDAZOLAM HYDROCHLORIDE 2 MG/2ML
2 INJECTION, SOLUTION INTRAMUSCULAR; INTRAVENOUS ONCE
Status: COMPLETED | OUTPATIENT
Start: 2024-07-29 | End: 2024-07-29

## 2024-07-29 RX ORDER — PROPOFOL 10 MG/ML
VIAL (ML) INTRAVENOUS AS NEEDED
Status: DISCONTINUED | OUTPATIENT
Start: 2024-07-29 | End: 2024-07-29 | Stop reason: SURG

## 2024-07-29 RX ADMIN — TRANEXAMIC ACID 1000 MG: 10 INJECTION, SOLUTION INTRAVENOUS at 10:46

## 2024-07-29 RX ADMIN — KETOROLAC TROMETHAMINE 15 MG: 15 INJECTION, SOLUTION INTRAMUSCULAR; INTRAVENOUS at 23:09

## 2024-07-29 RX ADMIN — KETOROLAC TROMETHAMINE 15 MG: 15 INJECTION, SOLUTION INTRAMUSCULAR; INTRAVENOUS at 17:47

## 2024-07-29 RX ADMIN — DEXAMETHASONE SODIUM PHOSPHATE 4 MG: 4 INJECTION, SOLUTION INTRAMUSCULAR; INTRAVENOUS at 12:26

## 2024-07-29 RX ADMIN — FENTANYL CITRATE 50 MCG: 50 INJECTION, SOLUTION INTRAMUSCULAR; INTRAVENOUS at 12:37

## 2024-07-29 RX ADMIN — ACETAMINOPHEN 1000 MG: 500 TABLET ORAL at 08:44

## 2024-07-29 RX ADMIN — FENTANYL CITRATE 50 MCG: 50 INJECTION, SOLUTION INTRAMUSCULAR; INTRAVENOUS at 12:17

## 2024-07-29 RX ADMIN — ONDANSETRON HYDROCHLORIDE 4 MG: 2 SOLUTION INTRAMUSCULAR; INTRAVENOUS at 12:15

## 2024-07-29 RX ADMIN — SODIUM CHLORIDE, POTASSIUM CHLORIDE, SODIUM LACTATE AND CALCIUM CHLORIDE 100 ML/HR: 600; 310; 30; 20 INJECTION, SOLUTION INTRAVENOUS at 14:50

## 2024-07-29 RX ADMIN — INSULIN LISPRO 8 UNITS: 100 INJECTION, SOLUTION INTRAVENOUS; SUBCUTANEOUS at 20:46

## 2024-07-29 RX ADMIN — EPINEPHRINE 0.15 MG: 1 INJECTION INTRAMUSCULAR; INTRAVENOUS; SUBCUTANEOUS at 11:08

## 2024-07-29 RX ADMIN — ONDANSETRON 4 MG: 2 INJECTION INTRAMUSCULAR; INTRAVENOUS at 22:50

## 2024-07-29 RX ADMIN — SENNOSIDES AND DOCUSATE SODIUM 2 TABLET: 50; 8.6 TABLET ORAL at 20:37

## 2024-07-29 RX ADMIN — ACETAMINOPHEN 1000 MG: 500 TABLET ORAL at 16:06

## 2024-07-29 RX ADMIN — LIDOCAINE HYDROCHLORIDE 60 MG: 20 INJECTION, SOLUTION INTRAVENOUS at 12:17

## 2024-07-29 RX ADMIN — HYDROMORPHONE HYDROCHLORIDE 0.5 MG: 1 INJECTION, SOLUTION INTRAMUSCULAR; INTRAVENOUS; SUBCUTANEOUS at 12:41

## 2024-07-29 RX ADMIN — SODIUM CHLORIDE, POTASSIUM CHLORIDE, SODIUM LACTATE AND CALCIUM CHLORIDE 9 ML/HR: 600; 310; 30; 20 INJECTION, SOLUTION INTRAVENOUS at 08:44

## 2024-07-29 RX ADMIN — SODIUM CHLORIDE 2 G: 9 INJECTION, SOLUTION INTRAVENOUS at 12:20

## 2024-07-29 RX ADMIN — HYDROMORPHONE HYDROCHLORIDE 0.5 MG: 1 INJECTION, SOLUTION INTRAMUSCULAR; INTRAVENOUS; SUBCUTANEOUS at 13:27

## 2024-07-29 RX ADMIN — SODIUM CHLORIDE, POTASSIUM CHLORIDE, SODIUM LACTATE AND CALCIUM CHLORIDE 100 ML/HR: 600; 310; 30; 20 INJECTION, SOLUTION INTRAVENOUS at 17:47

## 2024-07-29 RX ADMIN — FAMOTIDINE 40 MG: 20 TABLET ORAL at 16:06

## 2024-07-29 RX ADMIN — TRANEXAMIC ACID 1000 MG: 10 INJECTION, SOLUTION INTRAVENOUS at 13:23

## 2024-07-29 RX ADMIN — ROCURONIUM BROMIDE 50 MG: 10 INJECTION, SOLUTION INTRAVENOUS at 12:17

## 2024-07-29 RX ADMIN — INSULIN LISPRO 10 UNITS: 100 INJECTION, SOLUTION INTRAVENOUS; SUBCUTANEOUS at 17:47

## 2024-07-29 RX ADMIN — SUGAMMADEX 120 MG: 100 INJECTION, SOLUTION INTRAVENOUS at 13:34

## 2024-07-29 RX ADMIN — ROPIVACAINE HYDROCHLORIDE 30 ML: 5 INJECTION, SOLUTION EPIDURAL; INFILTRATION; PERINEURAL at 11:08

## 2024-07-29 RX ADMIN — MIDAZOLAM HYDROCHLORIDE 2 MG: 1 INJECTION, SOLUTION INTRAMUSCULAR; INTRAVENOUS at 10:44

## 2024-07-29 RX ADMIN — CELECOXIB 200 MG: 100 CAPSULE ORAL at 08:44

## 2024-07-29 RX ADMIN — PROPOFOL 150 MG: 10 INJECTION, EMULSION INTRAVENOUS at 12:17

## 2024-07-29 RX ADMIN — SODIUM CHLORIDE 2000 MG: 9 INJECTION, SOLUTION INTRAVENOUS at 20:36

## 2024-07-29 RX ADMIN — OXYCODONE HYDROCHLORIDE AND ACETAMINOPHEN 1 TABLET: 7.5; 325 TABLET ORAL at 19:58

## 2024-07-29 NOTE — CONSULTS
[facility]   Consult Note    Patient Name: Melania Centeno  : 1950  MRN: [id number]  Primary Care Physician:  Javon Pascal MD  Referring Physician: Rajeev Yañez MD  Date of admission: 2024    Subjective   Subjective     Reason for Consult/ Chief Complaint: left knee replacement    HPI:  Melania Centeno is a 74 y.o. female with PMH of arthritis, cervical spinal stenosis, GERD, HLD, HTN, PTSD, T2DM.    She has chronic bilateral knee pain and has failed conservative measures.  She has elected to pursue left knee surgery and underwent total left knee arthroplasty today with no complications.  Our services have been consulted to manage her medical needs during hospitalization.     Review of Systems  Review of Systems   Constitutional:  Positive for fatigue. Negative for activity change, appetite change and fever.   Musculoskeletal:  Positive for arthralgias.   All other systems reviewed and are negative.       Personal History     Past Medical History:   Diagnosis Date    Anesthesia     ptsd/pt has nightmares, but states no issues while waking  up  from  anesthesia    Arthritis     Cervical spinal stenosis 2019    Brisk reflexes suggestive of myelopathy/h/o cervical fusion    Cervicalgia     Depression     Diverticulitis of colon     Fatty liver     Foot pain     Fusion of spine of cervical region 2019    GERD (gastroesophageal reflux disease)     Hyperlipidemia     recent cardiology visit for surg clearance. no issues noted, no cp or soa    Hypertension     Leg pain     Psoriasis     PTSD (post-traumatic stress disorder)     Type 2 diabetes mellitus        Past Surgical History:   Procedure Laterality Date    BREAST BIOPSY      CERVICAL FUSION  2019    right C4-5,C5-6    CHOLECYSTECTOMY      COLONOSCOPY      COLONOSCOPY N/A 8/3/2023    Procedure: COLONOSCOPY WITH POLYPECTOMIES;  Surgeon: Neal Delaney MD;  Location: [facility]COPY;  Service: Gastroenterology;   Laterality: N/A;  COLON POLYPS, DIVERTICULOSIS    ENDOSCOPY N/A 8/3/2023    Procedure: ESOPHAGOGASTRODUODENOSCOPY WITH BIOPSIES;  Surgeon: Neal Delaney MD;  Location: [facility] ENDOSCOPY;  Service: Gastroenterology;  Laterality: N/A;  HIATAL HERNIA, REFLUX ESOPHAGITIS    HEMORRHOIDECTOMY      KNEE SURGERY      TUBAL ABDOMINAL LIGATION         Family History: family history includes Brain cancer in her sister; Breast cancer in her sister; Colon cancer (age of onset: 62) in her sister; Diabetes in her brother and mother; Heart disease in her brother and sister. Otherwise pertinent FHx was reviewed and not pertinent to current issue.    Social History:  reports that she has never smoked. She has never used smokeless tobacco. She reports that she does not drink alcohol and does not use drugs.    Home Medications:  DULoxetine, HYDROcodone-acetaminophen, Insulin NPH (Human) (Isophane), Insulin Pen Needle, amLODIPine, atorvastatin, benazepril, benzonatate, clonazePAM, gabapentin, glipizide, glucose blood, metoprolol succinate XL, omeprazole, and tiZANidine    Allergies:  No Known Allergies    Objective    Objective     Vitals:   Temp:  [97.4 °F (36.3 °C)-98.5 °F (36.9 °C)] 97.7 °F (36.5 °C)  Heart Rate:  [57-77] 65  Resp:  [12-20] 16  BP: (133-155)/(56-72) 136/58  Flow (L/min):  [2] 2    Physical Exam:             Constitutional:         Awake, alert responsive, conversant, no obvious distress   Eyes:                       PERRLA, sclerae anicteric, no conjunctival injection   HEENT:                   Moist mucous membranes, no nasal or eye discharge, no throat congestion   Neck:                      Supple, no thyromegaly, no lymphadenopathy, trachea midline, no elevated JVD   Respiratory:           Clear to auscultation bilaterally, nonlabored respirations    Cardiovascular:     RRR, no murmurs, rubs, or gallops, palpable pedal pulses bilaterally, No bilateral ankle edema   Gastrointestinal:   Positive bowel sounds,  soft, nontender, non-distended, no organomegaly   Musculoskeletal:  No clubbing or cyanosis to extremities, muscle wasting, joint swelling, muscle weakness   Psychiatric:              Appropriate affect, cooperative   Neurologic:            Awake alert, oriented x 3, strength symmetric in all extremities, Cranial Nerves grossly intact to confrontation, speech clear   Skin:                      No rashes, bruising, skin ulcers, petechiae or ecchymosis    Result Review    Result Review:  I have personally reviewed the results from the time of this admission to 7/29/2024 16:18 EDT and agree with these findings:  []  Laboratory  []  Microbiology  []  Radiology  []  EKG/Telemetry   []  Cardiology/Vascular   []  Pathology  []  Old records  []  Other:    Results from last 7 days   Lab Units 07/23/24  0852   WBC 10*3/mm3 7.56   HEMOGLOBIN g/dL 14.9   PLATELETS 10*3/mm3 127*     Results from last 7 days   Lab Units 07/23/24  0852   SODIUM mmol/L 135*   POTASSIUM mmol/L 4.7   CHLORIDE mmol/L 98   CO2 mmol/L 27.6   ANION GAP mmol/L 9.4   BUN mg/dL 18   CREATININE mg/dL 0.80   GLUCOSE mg/dL 242*       Assessment & Plan   Assessment / Plan     Active Hospital Problems:  Active Hospital Problems    Diagnosis     **S/P TKR (total knee replacement), left     Left knee pain     Osteoarthritis of both knees        Plan:   Pain management   PT recommendations per ortho  Sliding scale insulin       Electronically signed by Sj brito MD, 07/29/24, 4:18 PM EDT.

## 2024-07-29 NOTE — OP NOTE
TOTAL KNEE ARTHROPLASTY  Procedure Report    Patient Name:  Melania Centeno  YOB: 1950    Date of Surgery:  7/29/2024     Indications:  Severe OA, failed conservative treatment    Pre-op Diagnosis:   Left knee pain, unspecified chronicity [M25.562]  Osteoarthritis of both knees, unspecified osteoarthritis type [M17.0]       Post-Op Diagnosis Codes:     * Left knee pain, unspecified chronicity [M25.562]     * Osteoarthritis of both knees, unspecified osteoarthritis type [M17.0]    Procedure/CPT® Codes:      Procedure(s):  LEFT TOTAL KNEE ARTHROPLASTY    Surgical Approach: Knee Medial Parapatellar        Staff:  Surgeon(s):  Rajeev Yañez MD    Assistant: Donna Sanchez    Anesthesia: General with Block    Estimated Blood Loss: 100ml    Implants:    Implant Name Type Inv. Item Serial No.  Lot No. LRB No. Used Action   CMT BONE PALACOS R HI/VISC 1X40 - [id number] Implant CMT BONE PALACOS R HI/VISC 1X40  [facility] [id number] Left 2 Implanted   PAT 3PEG STD 8X28MM - [id number] Implant PAT 3PEG STD 8X28MM  [employer] [id number] Left 1 Implanted   TRY TIB CRUC 71MM - [id number] Implant TRY TIB CRUC 71MM  [employer] [id number] Left 1 Implanted   COMP FEM/KN VANGUARD INTLK CR 65MM NS LT - [id number] Implant COMP FEM/KN VANGUARD INTLK CR 65MM NS LT  [employer] [id number] Left 1 Implanted   BEAR TIB/KN VANGUARD AS [id number] NS - [id number] Implant BEAR TIB/KN VANGUARD AS [id number] NS  [employer] [id number] Left 1 Implanted       Specimen:          None        Findings: advance OA    Complications: None    Description of Procedure: The patient was brought to the operating room and underwent general anesthesia, had a preoperative antibiotic, and was then prepped and draped in sterile fashion. An Esmarch was used to exsanguinate the limb. The tourniquet was then inflated. A standard medial parapatellar arthrotomy was performed. An intramedullary guide was placed in the femur. A distal femoral  cut was made and measured. The 4-in-1 block was placed. Excellent cuts were achieved. Bone was removed, followed by removal of the ACL and remaining menisci. The intramedullary guide was placed in the tibia. The tibia was then cut and measured. This was then broached. The patella was then osteotomized, removing approximately 8-10 mm of bone. It measured. There was excellent range of motion, tracking and stability of the trials. The trials were removed. Bony cut surfaces were thoroughly irrigated. The knee was then injected. The cement was vacuum mixed, placed on the undersurface of the components and then packed into place. Excess cement was removed. Once this had hardened, trial polys were tried and the appropriate sized anterior insert was then chosen. This was then locked, thoroughly irrigated. Bovie electrocautery was used for hemostasis. The tourniquet was deflated. This was again thoroughly irrigated and closed using #1 Vicryl, 2-0 Vicryl and staples. A sterile dressing was applied. The patient was then extubated and taken to the recovery room in stable condition. There were no complications.    Assistant: Donna Sanchez  was responsible for performing the following activities: Retraction, Suction, Irrigation, Closing, and Placing Dressing and their skilled assistance was necessary for the success of this case.    Rjaeev Yañez MD     Date: 7/29/2024  Time: 13:41 EDT

## 2024-07-29 NOTE — ANESTHESIA PREPROCEDURE EVALUATION
Anesthesia Evaluation     Patient summary reviewed and Nursing notes reviewed   no history of anesthetic complications:   NPO Solid Status: > 8 hours  NPO Liquid Status: > 2 hours           Airway   Mallampati: II  TM distance: <3 FB  Neck ROM: full  No difficulty expected  Dental    (+) poor dentition        Pulmonary - negative pulmonary ROS and normal exam    breath sounds clear to auscultation  Cardiovascular - negative cardio ROS and normal exam  Exercise tolerance: good (4-7 METS)    Rhythm: regular  Rate: normal    (+) hypertension, hyperlipidemia      Neuro/Psych- negative ROS  (+) psychiatric history Depression and PTSD  GI/Hepatic/Renal/Endo - negative ROS   (+) obesity, liver disease fatty liver disease cirrhosis, diabetes mellitus    Musculoskeletal     (+) neck pain  Abdominal    Substance History      OB/GYN          Other - negative ROS  arthritis, blood dyscrasia thrombocytopenia,     ROS/Med Hx Other: >4METS.HX HTN,HLD,GERD,DM,FATTY LIVER,    CERVICAL FUSION.     CARDS OV 7/16/24, F/U IF S/S. PLTs 127. CARDS CLEARANCE. TJR. KT                     Anesthesia Plan    ASA 3     general with block     (Patient understands anesthesia not responsible for dental damage. Regional anesthesia options discussed with patient. Pt accepts regional block.)  intravenous induction     Anesthetic plan, risks, benefits, and alternatives have been provided, discussed and informed consent has been obtained with: patient.    Plan discussed with CRNA.        CODE STATUS:

## 2024-07-29 NOTE — ANESTHESIA PROCEDURE NOTES
Peripheral Block      Patient reassessed immediately prior to procedure    Patient location during procedure: pre-op  Start time: 7/29/2024 11:06 AM  Stop time: 7/29/2024 11:08 AM  Reason for block: at surgeon's request and post-op pain management  Performed by  Anesthesiologist: Aubrey Anderson MD  Preanesthetic Checklist  Completed: patient identified, IV checked, site marked, risks and benefits discussed, surgical consent, monitors and equipment checked, pre-op evaluation and timeout performed  Prep:  Pt Position: supine  Sterile barriers:cap, washed/disinfected hands, mask and gloves  Prep: ChloraPrep  Patient monitoring: blood pressure monitoring, continuous pulse oximetry and EKG  Procedure    Sedation: yes    Guidance:ultrasound guided    ULTRASOUND INTERPRETATION.  Using ultrasound guidance a 20 G gauge needle was placed in close proximity to the nerve, at which point, under ultrasound guidance anesthetic was injected in the area of the nerve and spread of the anesthesia was seen on ultrasound in close proximity thereto.  There were no abnormalities seen on ultrasound; a digital image was taken; and the patient tolerated the procedure with no complications. Images:still images obtained, printed/placed on chart    Laterality:left  Block Type:adductor canal block  Injection Technique:single-shot  Needle Type:echogenic  Needle Gauge:20 G (4in)  Resistance on Injection: none    Medications Used: EPINEPHrine (ADRENALIN) injection - Injection   0.15 mg - 7/29/2024 11:08:00 AM  ropivacaine (NAROPIN) 0.5 % injection - Injection   30 mL - 7/29/2024 11:08:00 AM      Post Assessment  Injection Assessment: negative aspiration for heme, no paresthesia on injection and incremental injection  Patient Tolerance:comfortable throughout block  Complications:no  Additional Notes  The block requested by the referring physician for management of postoperative pain, or pain related to a procedure. Ultrasound guidance (deemed  medically necessary). Painless injection, pt was awake and conversant during the procedure without complications. Needle and surrounding structures visualized throughout procedure. No adverse reactions or complications seen during this period. Post-procedure image showed no signs of complication, and anatomy was consistent with an uncomplicated nerve blockade.  Performed by: Aubrey Anderson MD

## 2024-07-29 NOTE — THERAPY EVALUATION
Acute Care - Physical Therapy Initial Evaluation   [place]     Patient Name: Melania Centeno  : 1950  MRN: [id number]  Today's Date: 2024     Admit date: 2024     Referring Physician: Rajeev Yañez MD     Surgery Date:2024   Procedure(s) (LRB):  LEFT TOTAL KNEE ARTHROPLASTY (Left)          Visit Dx:     ICD-10-CM ICD-9-CM   1. Difficulty in walking  R26.2 719.7   2. Left knee pain, unspecified chronicity  M25.562 719.46   3. Osteoarthritis of both knees, unspecified osteoarthritis type  M17.0 715.96     Patient Active Problem List   Diagnosis    Pelvic organ prolapse quantification stage 3 cystocele    Pelvic organ prolapse quantification stage 1 rectocele    Uterine prolapse    Urge incontinence    Atrophic vaginitis    Left hip pain    Gastroesophageal reflux disease    Type 2 diabetes mellitus with hyperglycemia, without long-term current use of insulin    Essential hypertension    Mixed hyperlipidemia    Cervical spinal stenosis    Fusion of spine of cervical region    Degeneration of lumbar intervertebral disc    Encounter for long-term (current) use of insulin    Lumbar spondylosis    Neck pain    Other cirrhosis of liver    Elevated alkaline phosphatase level    Right upper quadrant abdominal pain    Osteoporosis    Encounter for colonoscopy following colon polyp removal    Left knee pain    Osteoarthritis of both knees    S/P TKR (total knee replacement), left     Past Medical History:   Diagnosis Date    Anesthesia     ptsd/pt has nightmares, but states no issues while waking  up  from  anesthesia    Arthritis     Cervical spinal stenosis 2019    Brisk reflexes suggestive of myelopathy/h/o cervical fusion    Cervicalgia     Depression     Diverticulitis of colon     Fatty liver     Foot pain     Fusion of spine of cervical region 2019    GERD (gastroesophageal reflux disease)     Hyperlipidemia     recent cardiology visit for surg clearance. no issues noted, no cp or  soa    Hypertension     Leg pain     Psoriasis     PTSD (post-traumatic stress disorder)     Type 2 diabetes mellitus      Past Surgical History:   Procedure Laterality Date    BREAST BIOPSY      CERVICAL FUSION  03/14/2019    right C4-5,C5-6    CHOLECYSTECTOMY      COLONOSCOPY      COLONOSCOPY N/A 8/3/2023    Procedure: COLONOSCOPY WITH POLYPECTOMIES;  Surgeon: Neal Delaney MD;  Location: [facility] ENDOSCOPY;  Service: Gastroenterology;  Laterality: N/A;  COLON POLYPS, DIVERTICULOSIS    ENDOSCOPY N/A 8/3/2023    Procedure: ESOPHAGOGASTRODUODENOSCOPY WITH BIOPSIES;  Surgeon: Neal Delaney MD;  Location: [facility] ENDOSCOPY;  Service: Gastroenterology;  Laterality: N/A;  HIATAL HERNIA, REFLUX ESOPHAGITIS    HEMORRHOIDECTOMY      KNEE SURGERY      TUBAL ABDOMINAL LIGATION       PT Assessment (Last 12 Hours)       PT Evaluation and Treatment       Row Name 07/29/24 1400          Physical Therapy Time and Intention    Subjective Information no complaints  -YAMILET     Document Type evaluation  -YAMILET     Mode of Treatment individual therapy;physical therapy  -YAMILET     Patient Effort good  -YAMILET     Symptoms Noted During/After Treatment none  -YAMILET       Row Name 07/29/24 1400          General Information    Patient Observations alert;cooperative;agree to therapy  -YAMILET     Prior Level of Function independent:;all household mobility;community mobility  -YAMILET     Equipment Currently Used at Home none  -YAMILET     Existing Precautions/Restrictions fall;weight bearing  -YAMILET     Barriers to Rehab none identified  -YAMILET       Row Name 07/29/24 1400          Living Environment    Current Living Arrangements home  -YAMILET     People in Home spouse  -YAMILET       Row Name 07/29/24 1400          Cognition    Orientation Status (Cognition) oriented x 3  -YAMILET       Row Name 07/29/24 1400          Range of Motion Comprehensive    General Range of Motion lower extremity range of motion deficits identified  L knee 5-70°  -YAMILET       Row Name 07/29/24 1400           Strength Comprehensive (MMT)    General Manual Muscle Testing (MMT) Assessment lower extremity strength deficits identified  LLE 3+/5  -YAMILET       Row Name 07/29/24 1400          Mobility    Extremity Weight-bearing Status left lower extremity  -YAMILET     Left Lower Extremity (Weight-bearing Status) weight-bearing as tolerated (WBAT)  -YAMILET       Row Name 07/29/24 1400          Bed Mobility    Bed Mobility bed mobility (all) activities;supine-sit  -YAMILET     All Activities, [place] (Bed Mobility) moderate assist (50% patient effort)  -YAMILET     Supine-Sit [place] (Bed Mobility) moderate assist (50% patient effort)  -YAMILET       Row Name 07/29/24 1400          Transfers    Transfers bed-chair transfer;sit-stand transfer  -YAMILET       Row Name 07/29/24 1400          Bed-Chair Transfer    Bed-Chair [place] (Transfers) moderate assist (50% patient effort)  -YAMILET     Assistive Device (Bed-Chair Transfers) walker, front-wheeled  -YAMILET       Row Name 07/29/24 1400          Sit-Stand Transfer    Sit-Stand [place] (Transfers) moderate assist (50% patient effort)  -YAMILET     Assistive Device (Sit-Stand Transfers) walker, front-wheeled  -YAMILET       Row Name 07/29/24 1400          Gait/Stairs (Locomotion)    Gait/Stairs Locomotion gait/ambulation assistive device  -YAMILET     [place] Level (Gait) moderate assist (50% patient effort)  -YAMILET     Assistive Device (Gait) walker, front-wheeled  -YAMILET     Patient was able to Ambulate yes  -YAMILET     Distance in Feet (Gait) 10  -YAMILET     Pattern (Gait) step-to  limited by lethargy from surgery recovery  -YAMILET       Row Name 07/29/24 1400          Safety Issues, Functional Mobility    Impairments Affecting Function (Mobility) balance;pain;range of motion (ROM);strength  -YAMILET       Row Name 07/29/24 1400          Balance    Balance Assessment standing dynamic balance  -YAMILET     Dynamic Standing Balance moderate assist  -YAMILET     Position/Device Used, Standing Balance walker, front-wheeled  -YAMILET       Row  Name             Wound 07/29/24 1235 Left anterior knee Incision    Wound - Properties Group Placement Date: 07/29/24  -DW Placement Time: 1235  -DW Side: Left  -DW Orientation: anterior  -DW Location: knee  -DW Primary Wound Type: Incision  -DW    Retired Wound - Properties Group Placement Date: 07/29/24  -DW Placement Time: 1235  -DW Side: Left  -DW Orientation: anterior  -DW Location: knee  -DW Primary Wound Type: Incision  -DW    Retired Wound - Properties Group Date first assessed: 07/29/24  -DW Time first assessed: 1235  -DW Side: Left  -DW Location: knee  -DW Primary Wound Type: Incision  -DW      Row Name 07/29/24 1400          Plan of Care Review    Plan of Care Reviewed With patient  -YAMILET     Outcome Evaluation Pt presents with decreased ROM, strength, transfers and ambulation.  Skilled PT services will be required to address these mobility deficits.  -YAMILET       Row Name 07/29/24 1400          Therapy Assessment/Plan (PT)    Patient/Family Therapy Goals Statement (PT) Walk without pain  -YAMILET     Rehab Potential (PT) good, to achieve stated therapy goals  -YAMILET     Criteria for Skilled Interventions Met (PT) skilled treatment is necessary  -YAMILET     Therapy Frequency (PT) 2 times/day  -YAMILET     Predicted Duration of Therapy Intervention (PT) 10 days  -YAMILET     Problem List (PT) problems related to;balance;mobility;range of motion (ROM);strength;pain  -YAMILET     Activity Limitations Related to Problem List (PT) unable to ambulate safely;unable to transfer safely  -YAMILET       Row Name 07/29/24 1400          PT Evaluation Complexity    History, PT Evaluation Complexity no personal factors and/or comorbidities  -YAMILET     Examination of Body Systems (PT Eval Complexity) total of 4 or more elements  -YAMILET     Clinical Presentation (PT Evaluation Complexity) stable  -YAMILET     Clinical Decision Making (PT Evaluation Complexity) low complexity  -YAMILET     Overall Complexity (PT Evaluation Complexity) low complexity  -YAMILET       Row Name  07/29/24 1400          Therapy Plan Review/Discharge Plan (PT)    Therapy Plan Review (PT) evaluation/treatment results reviewed;participants voiced agreement with care plan;participants included;patient  -YAMILET       Row Name 07/29/24 1400          Physical Therapy Goals    Transfer Goal Selection (PT) transfer, PT goal 1  -YAMILET     Gait Training Goal Selection (PT) gait training, PT goal 1  -YAMILET     ROM Goal Selection (PT) ROM, PT goal 1  -YAMILET     Strength Goal Selection (PT) strength, PT goal 1  -YAMILET       Row Name 07/29/24 1400          Transfer Goal 1 (PT)    Activity/Assistive Device (Transfer Goal 1, PT) transfers, all  -YAMILET     [place] Level/Cues Needed (Transfer Goal 1, PT) independent  -YAMILET     Time Frame (Transfer Goal 1, PT) long term goal (LTG);10 days  -YAMILET       Row Name 07/29/24 1400          Gait Training Goal 1 (PT)    Activity/Assistive Device (Gait Training Goal 1, PT) gait (walking locomotion);walker, rolling  -YAMILET     [place] Level (Gait Training Goal 1, PT) independent  -YAMILET     Distance (Gait Training Goal 1, PT) 300  -YAMILET     Time Frame (Gait Training Goal 1, PT) long term goal (LTG);10 days  -YAMILET       Row Name 07/29/24 1400          ROM Goal 1 (PT)    ROM Goal 1 (PT) Pt will demonstrate knee ROM from 0-110° on the affected side.  -YAMILET     Time Frame (ROM Goal 1, PT) long-term goal (LTG);10 days  -YAMILET       Row Name 07/29/24 1400          Strength Goal 1 (PT)    Strength Goal 1 (PT) Pt will demonstrate knee extension strength of 5/5 on the affected side.  -YAMILET     Time Frame (Strength Goal 1, PT) long term goal (LTG);10 days  -YAMILET               User Key  (r) = Recorded By, (t) = Taken By, (c) = Cosigned By      Initials Name Provider Type    Ji Nelson, PT Physical Therapist    Allison Schmid, RN Registered Nurse                    Physical Therapy Education       Title: PT OT SLP Therapies (Done)       Topic: Physical Therapy (Done)       Point: Mobility training (Done)        Learning Progress Summary             Patient Acceptance, E,TB, VU by YAMILET at 7/29/2024 1454                         Point: Precautions (Done)       Learning Progress Summary             Patient Acceptance, E,TB, VU by YAMILET at 7/29/2024 1454                                         User Key       Initials Effective Dates Name Provider Type Discipline    YAMILET 06/03/21 -  Ji Henson PT Physical Therapist PT                  PT Recommendation and Plan  Anticipated Discharge Disposition (PT): home with outpatient therapy services  Planned Therapy Interventions (PT): balance training, bed mobility training, gait training, home exercise program, stair training, ROM (range of motion), strengthening, stretching, transfer training  Therapy Frequency (PT): 2 times/day  Plan of Care Reviewed With: patient  Outcome Evaluation: Pt presents with decreased ROM, strength, transfers and ambulation.  Skilled PT services will be required to address these mobility deficits.   Outcome Measures       Row Name 07/29/24 1400             How much help from another person do you currently need...    Turning from your back to your side while in flat bed without using bedrails? 3  -YAMILET      Moving from lying on back to sitting on the side of a flat bed without bedrails? 2  -YAMILET      Moving to and from a bed to a chair (including a wheelchair)? 2  -YAMILET      Standing up from a chair using your arms (e.g., wheelchair, bedside chair)? 2  -YAMILET      Climbing 3-5 steps with a railing? 2  -YAMILET      To walk in hospital room? 2  -YAMILET      AM-PAC 6 Clicks Score (PT) 13  -YAMILET      Highest Level of Mobility Goal 4 --> Transfer to chair/commode  -YAMILET         Functional Assessment    Outcome Measure Options AM-PAC 6 Clicks Basic Mobility (PT)  -YAMILET                User Key  (r) = Recorded By, (t) = Taken By, (c) = Cosigned By      Initials Name Provider Type    Ji Nelson PT Physical Therapist                     Time Calculation:    PT Charges       Row  Name 07/29/24 1450             Time Calculation    PT Received On 07/29/24  -YAMILET      PT Goal Re-Cert Due Date 08/07/24  -YAMILET         Untimed Charges    PT Eval/Re-eval Minutes 20  -YAMILET         Total Minutes    Untimed Charges Total Minutes 20  -YAMILET       Total Minutes 20  -YAMILET                User Key  (r) = Recorded By, (t) = Taken By, (c) = Cosigned By      Initials Name Provider Type    Ji Nelson, PT Physical Therapist                      PT G-Codes  Outcome Measure Options: AM-PAC 6 Clicks Basic Mobility (PT)  AM-PAC 6 Clicks Score (PT): 13    Ji Henson, PT  7/29/2024

## 2024-07-29 NOTE — ANESTHESIA POSTPROCEDURE EVALUATION
Patient: Melania Centeno    Procedure Summary       Date: 07/29/24 Room / Location: [facility] OR 06 / [facility] MAIN OR    Anesthesia Start: 1211 Anesthesia Stop: 1352    Procedure: LEFT TOTAL KNEE ARTHROPLASTY (Left: Knee) Diagnosis:       Left knee pain, unspecified chronicity      Osteoarthritis of both knees, unspecified osteoarthritis type      (Left knee pain, unspecified chronicity [M25.562])      (Osteoarthritis of both knees, unspecified osteoarthritis type [M17.0])    Surgeons: Rajeev Yañez MD Provider: Aubrey Anderson MD    Anesthesia Type: general with block ASA Status: 3            Anesthesia Type: general with block    Vitals  Vitals Value Taken Time   /68 07/29/24 1431   Temp 36.5 °C (97.7 °F) 07/29/24 1430   Pulse 70 07/29/24 1432   Resp 18 07/29/24 1430   SpO2 97 % 07/29/24 1432   Vitals shown include unfiled device data.        Post Anesthesia Care and Evaluation    Patient location during evaluation: bedside  Patient participation: complete - patient participated  Level of consciousness: awake  Pain management: adequate    Airway patency: patent  PONV Status: none  Cardiovascular status: acceptable and stable  Respiratory status: acceptable  Hydration status: acceptable

## 2024-07-30 VITALS
HEIGHT: 62 IN | OXYGEN SATURATION: 95 % | SYSTOLIC BLOOD PRESSURE: 158 MMHG | TEMPERATURE: 98.4 F | HEART RATE: 83 BPM | BODY MASS INDEX: 39.35 KG/M2 | RESPIRATION RATE: 16 BRPM | DIASTOLIC BLOOD PRESSURE: 62 MMHG | WEIGHT: 213.85 LBS

## 2024-07-30 DIAGNOSIS — M17.0 OSTEOARTHRITIS OF BOTH KNEES, UNSPECIFIED OSTEOARTHRITIS TYPE: Primary | ICD-10-CM

## 2024-07-30 DIAGNOSIS — Z96.652 AFTERCARE FOLLOWING LEFT KNEE JOINT REPLACEMENT SURGERY: ICD-10-CM

## 2024-07-30 DIAGNOSIS — Z47.1 AFTERCARE FOLLOWING LEFT KNEE JOINT REPLACEMENT SURGERY: ICD-10-CM

## 2024-07-30 LAB
ANION GAP SERPL CALCULATED.3IONS-SCNC: 9.5 MMOL/L (ref 5–15)
BUN SERPL-MCNC: 14 MG/DL (ref 8–23)
BUN/CREAT SERPL: 20.3 (ref 7–25)
CALCIUM SPEC-SCNC: 8.9 MG/DL (ref 8.6–10.5)
CHLORIDE SERPL-SCNC: 99 MMOL/L (ref 98–107)
CO2 SERPL-SCNC: 25.5 MMOL/L (ref 22–29)
CREAT SERPL-MCNC: 0.69 MG/DL (ref 0.57–1)
DEPRECATED RDW RBC AUTO: 45.1 FL (ref 37–54)
EGFRCR SERPLBLD CKD-EPI 2021: 91.8 ML/MIN/1.73
ERYTHROCYTE [DISTWIDTH] IN BLOOD BY AUTOMATED COUNT: 14 % (ref 12.3–15.4)
GLUCOSE BLDC GLUCOMTR-MCNC: 245 MG/DL (ref 70–99)
GLUCOSE SERPL-MCNC: 231 MG/DL (ref 65–99)
HCT VFR BLD AUTO: 37.4 % (ref 34–46.6)
HGB BLD-MCNC: 12.3 G/DL (ref 12–15.9)
MCH RBC QN AUTO: 29.1 PG (ref 26.6–33)
MCHC RBC AUTO-ENTMCNC: 32.9 G/DL (ref 31.5–35.7)
MCV RBC AUTO: 88.6 FL (ref 79–97)
PLATELET # BLD AUTO: 105 10*3/MM3 (ref 140–450)
PMV BLD AUTO: 11.7 FL (ref 6–12)
POTASSIUM SERPL-SCNC: 4.4 MMOL/L (ref 3.5–5.2)
RBC # BLD AUTO: 4.22 10*6/MM3 (ref 3.77–5.28)
SODIUM SERPL-SCNC: 134 MMOL/L (ref 136–145)
WBC NRBC COR # BLD AUTO: 11.17 10*3/MM3 (ref 3.4–10.8)

## 2024-07-30 PROCEDURE — 97116 GAIT TRAINING THERAPY: CPT

## 2024-07-30 PROCEDURE — 25010000002 PROCHLORPERAZINE 10 MG/2ML SOLUTION: Performed by: NURSE PRACTITIONER

## 2024-07-30 PROCEDURE — 63710000001 OXYCODONE-ACETAMINOPHEN 7.5-325 MG TABLET: Performed by: ORTHOPAEDIC SURGERY

## 2024-07-30 PROCEDURE — 25010000002 ENOXAPARIN PER 10 MG: Performed by: ORTHOPAEDIC SURGERY

## 2024-07-30 PROCEDURE — 97150 GROUP THERAPEUTIC PROCEDURES: CPT

## 2024-07-30 PROCEDURE — A9270 NON-COVERED ITEM OR SERVICE: HCPCS | Performed by: NURSE PRACTITIONER

## 2024-07-30 PROCEDURE — 80048 BASIC METABOLIC PNL TOTAL CA: CPT | Performed by: ORTHOPAEDIC SURGERY

## 2024-07-30 PROCEDURE — 63710000001 FERROUS SULFATE 325 (65 FE) MG TABLET: Performed by: ORTHOPAEDIC SURGERY

## 2024-07-30 PROCEDURE — 63710000001 INSULIN LISPRO (HUMAN) PER 5 UNITS: Performed by: NURSE PRACTITIONER

## 2024-07-30 PROCEDURE — A9270 NON-COVERED ITEM OR SERVICE: HCPCS | Performed by: ORTHOPAEDIC SURGERY

## 2024-07-30 PROCEDURE — 25010000002 CEFAZOLIN PER 500 MG: Performed by: ORTHOPAEDIC SURGERY

## 2024-07-30 PROCEDURE — 97165 OT EVAL LOW COMPLEX 30 MIN: CPT

## 2024-07-30 PROCEDURE — 63710000001 FAMOTIDINE 20 MG TABLET: Performed by: ORTHOPAEDIC SURGERY

## 2024-07-30 PROCEDURE — 25010000002 KETOROLAC TROMETHAMINE PER 15 MG: Performed by: ORTHOPAEDIC SURGERY

## 2024-07-30 PROCEDURE — 82948 REAGENT STRIP/BLOOD GLUCOSE: CPT | Performed by: NURSE PRACTITIONER

## 2024-07-30 PROCEDURE — 97535 SELF CARE MNGMENT TRAINING: CPT

## 2024-07-30 PROCEDURE — 85027 COMPLETE CBC AUTOMATED: CPT | Performed by: ORTHOPAEDIC SURGERY

## 2024-07-30 PROCEDURE — 63710000001 SENNOSIDES-DOCUSATE 8.6-50 MG TABLET: Performed by: ORTHOPAEDIC SURGERY

## 2024-07-30 RX ORDER — OXYCODONE AND ACETAMINOPHEN 7.5; 325 MG/1; MG/1
1-2 TABLET ORAL EVERY 4 HOURS PRN
Qty: 40 TABLET | Refills: 0 | Status: SHIPPED | OUTPATIENT
Start: 2024-07-30

## 2024-07-30 RX ORDER — PROCHLORPERAZINE EDISYLATE 5 MG/ML
5 INJECTION INTRAMUSCULAR; INTRAVENOUS EVERY 6 HOURS PRN
Status: DISCONTINUED | OUTPATIENT
Start: 2024-07-30 | End: 2024-07-30 | Stop reason: HOSPADM

## 2024-07-30 RX ADMIN — OXYCODONE HYDROCHLORIDE AND ACETAMINOPHEN 1 TABLET: 7.5; 325 TABLET ORAL at 01:28

## 2024-07-30 RX ADMIN — PROCHLORPERAZINE EDISYLATE 5 MG: 5 INJECTION INTRAMUSCULAR; INTRAVENOUS at 01:32

## 2024-07-30 RX ADMIN — INSULIN LISPRO 5 UNITS: 100 INJECTION, SOLUTION INTRAVENOUS; SUBCUTANEOUS at 08:06

## 2024-07-30 RX ADMIN — ENOXAPARIN SODIUM 40 MG: 100 INJECTION SUBCUTANEOUS at 09:07

## 2024-07-30 RX ADMIN — SODIUM CHLORIDE 2000 MG: 9 INJECTION, SOLUTION INTRAVENOUS at 03:40

## 2024-07-30 RX ADMIN — FERROUS SULFATE TAB 325 MG (65 MG ELEMENTAL FE) 325 MG: 325 (65 FE) TAB at 08:06

## 2024-07-30 RX ADMIN — FAMOTIDINE 40 MG: 20 TABLET ORAL at 09:07

## 2024-07-30 RX ADMIN — SENNOSIDES AND DOCUSATE SODIUM 2 TABLET: 50; 8.6 TABLET ORAL at 09:07

## 2024-07-30 RX ADMIN — KETOROLAC TROMETHAMINE 15 MG: 15 INJECTION, SOLUTION INTRAMUSCULAR; INTRAVENOUS at 05:25

## 2024-07-30 RX ADMIN — OXYCODONE HYDROCHLORIDE AND ACETAMINOPHEN 2 TABLET: 7.5; 325 TABLET ORAL at 09:07

## 2024-07-30 NOTE — SIGNIFICANT NOTE
07/30/24 0740   Plan   Final Discharge Disposition Code 06 - home with home health care   Final Note Home with [facility].

## 2024-07-30 NOTE — THERAPY EVALUATION
Patient Name: Melania Centeno  : 1950    MRN: [id number]                              Today's Date: 2024       Admit Date: 2024    Visit Dx:     ICD-10-CM ICD-9-CM   1. Difficulty in walking  R26.2 719.7   2. Left knee pain, unspecified chronicity  M25.562 719.46   3. Osteoarthritis of both knees, unspecified osteoarthritis type  M17.0 715.96   4. Decreased activities of daily living (ADL)  Z78.9 V49.89     Patient Active Problem List   Diagnosis    Pelvic organ prolapse quantification stage 3 cystocele    Pelvic organ prolapse quantification stage 1 rectocele    Uterine prolapse    Urge incontinence    Atrophic vaginitis    Left hip pain    Gastroesophageal reflux disease    Type 2 diabetes mellitus with hyperglycemia, without long-term current use of insulin    Essential hypertension    Mixed hyperlipidemia    Cervical spinal stenosis    Fusion of spine of cervical region    Degeneration of lumbar intervertebral disc    Encounter for long-term (current) use of insulin    Lumbar spondylosis    Neck pain    Other cirrhosis of liver    Elevated alkaline phosphatase level    Right upper quadrant abdominal pain    Osteoporosis    Encounter for colonoscopy following colon polyp removal    Left knee pain    Osteoarthritis of both knees    S/P TKR (total knee replacement), left     Past Medical History:   Diagnosis Date    Anesthesia     ptsd/pt has nightmares, but states no issues while waking  up  from  anesthesia    Arthritis     Cervical spinal stenosis 2019    Brisk reflexes suggestive of myelopathy/h/o cervical fusion    Cervicalgia     Depression     Diverticulitis of colon     Fatty liver     Foot pain     Fusion of spine of cervical region 2019    GERD (gastroesophageal reflux disease)     Hyperlipidemia     recent cardiology visit for surg clearance. no issues noted, no cp or soa    Hypertension     Leg pain     Psoriasis     PTSD (post-traumatic stress disorder)     Type 2 diabetes  mellitus      Past Surgical History:   Procedure Laterality Date    BREAST BIOPSY      CERVICAL FUSION  03/14/2019    right C4-5,C5-6    CHOLECYSTECTOMY      COLONOSCOPY      COLONOSCOPY N/A 8/3/2023    Procedure: COLONOSCOPY WITH POLYPECTOMIES;  Surgeon: Neal Delaney MD;  Location: [facility] ENDOSCOPY;  Service: Gastroenterology;  Laterality: N/A;  COLON POLYPS, DIVERTICULOSIS    ENDOSCOPY N/A 8/3/2023    Procedure: ESOPHAGOGASTRODUODENOSCOPY WITH BIOPSIES;  Surgeon: Neal Delaney MD;  Location: [facility] ENDOSCOPY;  Service: Gastroenterology;  Laterality: N/A;  HIATAL HERNIA, REFLUX ESOPHAGITIS    HEMORRHOIDECTOMY      KNEE SURGERY      TOTAL KNEE ARTHROPLASTY Left 7/29/2024    Procedure: LEFT TOTAL KNEE ARTHROPLASTY;  Surgeon: Rajeev Yañez MD;  Location: [facility] MAIN OR;  Service: Orthopedics;  Laterality: Left;    TUBAL ABDOMINAL LIGATION        General Information       Row Name 07/30/24 1324 07/30/24 1313       OT Time and Intention    Document Type therapy note (daily note)  -AV evaluation  -AV    Mode of Treatment individual therapy;occupational therapy  -AV individual therapy;occupational therapy  -AV      Row Name 07/30/24 1313          General Information    Patient Profile Reviewed yes  -AV     Prior Level of Function independent:;ADL's;home management;transfer;all household mobility  stands to shower (walk-in shower). stands at sink to groom. standard commode. ambulates without assistive device. no home O2.  -AV     Existing Precautions/Restrictions fall  WBAT  -AV     Barriers to Rehab none identified  -AV       Row Name 07/30/24 1313          Occupational Profile    Reason for Services/Referral (Occupational Profile) Patient is a 74 year old female admitted to [facility] on 7/29/24 with left knee pain. She is currently post-op day #1: total knee replacement. She is on 2N/ room air. OT consulted due to recent decline in ADL/ transfer independence. No previous OT services for  current condition.  -AV       Row Name 07/30/24 1313          Living Environment    People in Home spouse;other (see comments)  , daughter and family  -AV       Row Name 07/30/24 1313          Home Main Entrance    Number of Stairs, Main Entrance six  -AV       Row Name 07/30/24 1313          Stairs Within Home, Primary    Number of Stairs, Within Home, Primary none  -AV       Row Name 07/30/24 1324 07/30/24 1313       Cognition    Orientation Status (Cognition) --  receptive to cues for safe transfers, positioning during ADLs  -AV --  alert, pleasant and cooperative. able to retain information and follow commands.  -AV      Row Name 07/30/24 1313          Safety Issues, Functional Mobility    Impairments Affecting Function (Mobility) balance;endurance/activity tolerance;pain  -AV               User Key  (r) = Recorded By, (t) = Taken By, (c) = Cosigned By      Initials Name Provider Type    AV Robe Alexander, OT Occupational Therapist                     Mobility/ADL's       Row Name 07/30/24 1324 07/30/24 1316       Transfers    Transfers sit-stand transfer  -AV sit-stand transfer  -AV      Row Name 07/30/24 1324 07/30/24 1316       Sit-Stand Transfer    Sit-Stand [place] (Transfers) contact guard;verbal cues;nonverbal cues (demo/gesture)  -AV contact guard;verbal cues;nonverbal cues (demo/gesture)  -AV    Assistive Device (Sit-Stand Transfers) walker, front-wheeled  -AV walker, front-wheeled  -AV    Comment, (Sit-Stand Transfer) x3 during functional ADL session  -AV --      Row Name 07/30/24 1324 07/30/24 1316       Activities of Daily Living    BADL Assessment/Intervention --  Independent UB bathing/ dressing with setup while seated. Min assist to wash feet only. Max assist to don/doff brief, slipper socks (pt preference for footwear).  -AV --  Independent feeding, grooming and upper body bathing/dressing with setup while seated. Min assist lower body bathing. Max assist lower body dressing. CGA  toilet hygiene using elevated commode.  -AV      Row Name 07/30/24 1316          Mobility    Extremity Weight-bearing Status left lower extremity  -AV     Left Lower Extremity (Weight-bearing Status) weight-bearing as tolerated (WBAT)  -AV               User Key  (r) = Recorded By, (t) = Taken By, (c) = Cosigned By      Initials Name Provider Type    AV Robe Alexander OT Occupational Therapist                   Obj/Interventions       Row Name 07/30/24 1317          Sensory Assessment (Somatosensory)    Sensory Assessment (Somatosensory) UE sensation intact  -AV       Row Name 07/30/24 1317          Vision Assessment/Intervention    Visual Impairment/Limitations WFL;corrective lenses for reading  -AV       Row Name 07/30/24 1317          Range of Motion Comprehensive    General Range of Motion bilateral upper extremity ROM WFL  -AV     Comment, General Range of Motion AROM  -AV       Row Name 07/30/24 1317          Strength Comprehensive (MMT)    Comment, General Manual Muscle Testing (MMT) Assessment 4/5 bilateral biceps, triceps and   -AV       Row Name 07/30/24 1317          Motor Skills    Motor Skills coordination;functional endurance  -AV     Coordination WFL  right dominant  -AV     Functional Endurance fair minus  -AV       Row Name 07/30/24 1325 07/30/24 1317       Balance    Balance Assessment sitting dynamic balance;sit to stand dynamic balance;standing dynamic balance  -AV standing dynamic balance  -AV    Dynamic Sitting Balance independent  -AV --    Dynamic Standing Balance contact guard;minimal assist;verbal cues;non-verbal cues (demo/gesture)  -AV contact guard;minimal assist;verbal cues;non-verbal cues (demo/gesture)  -AV    Position/Device Used, Standing Balance walker, rolling  -AV walker, rolling  -AV    Balance Interventions standing;sit to stand;supported;minimal challenge;occupation based/functional task  -AV --              User Key  (r) = Recorded By, (t) = Taken By, (c) = Cosigned By       Initials Name Provider Type    AV Robe Alexander, OT Occupational Therapist                   Goals/Plan       Row Name 07/30/24 1320          Transfer Goal 1 (OT)    Activity/Assistive Device (Transfer Goal 1, OT) transfers, all;walker, rolling  -AV     [place] Level/Cues Needed (Transfer Goal 1, OT) modified independence  -AV     Time Frame (Transfer Goal 1, OT) long term goal (LTG);10 days  -AV       Row Name 07/30/24 1320          Bathing Goal 1 (OT)    Activity/Device (Bathing Goal 1, OT) bathing skills, all;shower chair  -AV     [place] Level/Cues Needed (Bathing Goal 1, OT) modified independence  -AV     Time Frame (Bathing Goal 1, OT) long term goal (LTG);10 days  -AV       Row Name 07/30/24 1320          Dressing Goal 1 (OT)    Activity/Device (Dressing Goal 1, OT) dressing skills, all  -AV     [place]/Cues Needed (Dressing Goal 1, OT) modified independence  -AV     Time Frame (Dressing Goal 1, OT) long term goal (LTG);10 days  -AV       Row Name 07/30/24 1320          Toileting Goal 1 (OT)    Activity/Device (Toileting Goal 1, OT) toileting skills, all;raised toilet seat  -AV     [place] Level/Cues Needed (Toileting Goal 1, OT) modified independence  -AV     Time Frame (Toileting Goal 1, OT) long term goal (LTG);10 days  -AV       Row Name 07/30/24 1320          Grooming Goal 1 (OT)    Activity/Device (Grooming Goal 1, OT) grooming skills, all  standing at sink  -AV     [place] (Grooming Goal 1, OT) modified independence  -AV     Time Frame (Grooming Goal 1, OT) long term goal (LTG);10 days  -AV       Row Name 07/30/24 1320          Problem Specific Goal 1 (OT)    Problem Specific Goal 1 (OT) Patient will demonstrate fair plus endurance/ activity tolerance needed to support ADLs.  -AV     Time Frame (Problem Specific Goal 1, OT) long term goal (LTG);10 days  -AV       Row Name 07/30/24 1320          Therapy Assessment/Plan (OT)    Planned Therapy Interventions (OT) activity  tolerance training;BADL retraining;functional balance retraining;occupation/activity based interventions;patient/caregiver education/training;transfer/mobility retraining  -AV               User Key  (r) = Recorded By, (t) = Taken By, (c) = Cosigned By      Initials Name Provider Type    Robe Ingram, OT Occupational Therapist                   Clinical Impression       Row Name 07/30/24 1317          Pain Assessment    Additional Documentation Pain Scale: FACES Pre/Post-Treatment (Group)  -AV       Row Name 07/30/24 6387          Pain Scale: FACES Pre/Post-Treatment    Pain: FACES Scale, Pretreatment 4-->hurts little more  -AV     Posttreatment Pain Rating 4-->hurts little more  -AV     Pain Location - Side/Orientation Bilateral  -AV     Pain Location lower  -AV     Pain Location - extremity  -AV     Pre/Posttreatment Pain Comment BLE hypersensitivity to touch (light or firm) resulting in pain (x 2 yrs)  -AV       Row Name 07/30/24 1317          Plan of Care Review    Plan of Care Reviewed With patient  -AV     Progress improving  Through ADL/transfer retraining, patient is now able to perform bathing min assist.  -AV     Outcome Evaluation Patient presents with limitations of balance and standing endurance/ activity tolerance which are impacting ADL/ transfer independence. Skilled OT services are indicated to remediate/ compensate for deficits to maximize independence and safety with functional tasks.  -AV       Row Name 07/30/24 1205          Therapy Assessment/Plan (OT)    Patient/Family Therapy Goal Statement (OT) less pain- walk better  -AV     Rehab Potential (OT) good, to achieve stated therapy goals  -AV     Criteria for Skilled Therapeutic Interventions Met (OT) yes;meets criteria;skilled treatment is necessary  -AV     Therapy Frequency (OT) 5 times/wk  -AV       Row Name 07/30/24 5257          Therapy Plan Review/Discharge Plan (OT)    Anticipated Discharge Disposition (OT) home with assist;home  with home health  granddaughter to assist at home.  -AV       Row Name 07/30/24 1317          Vital Signs    O2 Delivery Pre Treatment room air  -AV     O2 Delivery Intra Treatment room air  -AV     O2 Delivery Post Treatment room air  -AV       Row Name 07/30/24 1317          Positioning and Restraints    Pre-Treatment Position sitting in chair/recliner  -AV     Post Treatment Position chair  -AV     In Chair reclined;call light within reach;encouraged to call for assist;exit alarm on  -AV               User Key  (r) = Recorded By, (t) = Taken By, (c) = Cosigned By      Initials Name Provider Type    Robe Ingram, DONNIE Occupational Therapist                   Outcome Measures       Row Name 07/30/24 1321          How much help from another is currently needed...    Putting on and taking off regular lower body clothing? 2  -AV     Bathing (including washing, rinsing, and drying) 3  -AV     Toileting (which includes using toilet bed pan or urinal) 3  -AV     Putting on and taking off regular upper body clothing 4  -AV     Taking care of personal grooming (such as brushing teeth) 4  -AV     Eating meals 4  -AV     AM-PAC 6 Clicks Score (OT) 20  -AV       Row Name 07/30/24 0907          How much help from another person do you currently need...    Turning from your back to your side while in flat bed without using bedrails? 3  -RONY     Moving from lying on back to sitting on the side of a flat bed without bedrails? 2  -RONY     Moving to and from a bed to a chair (including a wheelchair)? 2  -RONY     Standing up from a chair using your arms (e.g., wheelchair, bedside chair)? 2  -RONY     Climbing 3-5 steps with a railing? 2  -RONY     To walk in hospital room? 2  -RONY     AM-PAC 6 Clicks Score (PT) 13  -RONY     Highest Level of Mobility Goal 4 --> Transfer to chair/commode  -RONY       Row Name 07/30/24 1321          Functional Assessment    Outcome Measure Options AM-PAC 6 Clicks Daily Activity (OT);Optimal Instrument  -AV        Row Name 07/30/24 1321          Optimal Instrument    Optimal Instrument Optimal - 3  -AV     Bending/Stooping 2  -AV     Standing 2  -AV     Reaching 1  -AV     From the list, choose the 3 activities you would most like to be able to do without any difficulty Bending/stooping;Standing;Reaching  -AV     Total Score Optimal - 3 5  -AV               User Key  (r) = Recorded By, (t) = Taken By, (c) = Cosigned By      Initials Name Provider Type    AV Robe Alexander OT Occupational Therapist    Peyton Hutchinson RN Registered Nurse                    Occupational Therapy Education       Title: PT OT SLP Therapies (Done)       Topic: Occupational Therapy (Done)       Point: ADL training (Done)       Description:   Instruct learner(s) on proper safety adaptation and remediation techniques during self care or transfers.   Instruct in proper use of assistive devices.                  Learning Progress Summary             Patient Acceptance, E, VU by AV at 7/30/2024 1322    Comment: WBAT  need for staff assistance for all standing ADL/ transfers  safe positioning ADLs  safe transfer techniques  adaptive techniques for lower body ADLs  home safety/ adaptive equipment recommendations                         Point: Home exercise program (Done)       Description:   Instruct learner(s) on appropriate technique for monitoring, assisting and/or progressing therapeutic exercises/activities.                  Learning Progress Summary             Patient Acceptance, E, VU by AV at 7/30/2024 1322    Comment: WBAT  need for staff assistance for all standing ADL/ transfers  safe positioning ADLs  safe transfer techniques  adaptive techniques for lower body ADLs  home safety/ adaptive equipment recommendations                         Point: Precautions (Done)       Description:   Instruct learner(s) on prescribed precautions during self-care and functional transfers.                  Learning Progress Summary             Patient  Acceptance, E, VU by AV at 7/30/2024 1322    Comment: WBAT  need for staff assistance for all standing ADL/ transfers  safe positioning ADLs  safe transfer techniques  adaptive techniques for lower body ADLs  home safety/ adaptive equipment recommendations                         Point: Body mechanics (Done)       Description:   Instruct learner(s) on proper positioning and spine alignment during self-care, functional mobility activities and/or exercises.                  Learning Progress Summary             Patient Acceptance, E, VU by AV at 7/30/2024 1322    Comment: WBAT  need for staff assistance for all standing ADL/ transfers  safe positioning ADLs  safe transfer techniques  adaptive techniques for lower body ADLs  home safety/ adaptive equipment recommendations                                         User Key       Initials Effective Dates Name Provider Type Discipline    AV 06/16/21 -  Robe Alexander OT Occupational Therapist OT                  OT Recommendation and Plan  Planned Therapy Interventions (OT): activity tolerance training, BADL retraining, functional balance retraining, occupation/activity based interventions, patient/caregiver education/training, transfer/mobility retraining  Therapy Frequency (OT): 5 times/wk  Plan of Care Review  Plan of Care Reviewed With: patient  Progress: improving (Through ADL/transfer retraining, patient is now able to perform bathing min assist.)  Outcome Evaluation: Patient presents with limitations of balance and standing endurance/ activity tolerance which are impacting ADL/ transfer independence. Skilled OT services are indicated to remediate/ compensate for deficits to maximize independence and safety with functional tasks.     Time Calculation:   Evaluation Complexity (OT)  Review Occupational Profile/Medical/Therapy History Complexity: expanded/moderate complexity  Assessment, Occupational Performance/Identification of Deficit Complexity: 1-3 performance  deficits  Clinical Decision Making Complexity (OT): problem focused assessment/low complexity  Overall Complexity of Evaluation (OT): low complexity     Time Calculation- OT       Row Name 07/30/24 1323             Time Calculation- OT    OT Received On 07/30/24  -AV      OT Goal Re-Cert Due Date 08/08/24  -AV         Timed Charges    04032 - OT Self Care/Mgmt Minutes 25  -AV         Untimed Charges    OT Eval/Re-eval Minutes 32  -AV         Total Minutes    Timed Charges Total Minutes 25  -AV      Untimed Charges Total Minutes 32  -AV       Total Minutes 57  -AV                User Key  (r) = Recorded By, (t) = Taken By, (c) = Cosigned By      Initials Name Provider Type    AV Robe Alexander OT Occupational Therapist                  Therapy Charges for Today       Code Description Service Date Service Provider Modifiers Qty    69476070194 HC OT SELF CARE/MGMT/TRAIN EA 15 MIN 7/30/2024 Robe Alexander OT GO 2    51039290411 HC OT EVAL LOW COMPLEXITY 3 7/30/2024 Rboe Alexander OT GO 1                 Robe Alexander OT  7/30/2024

## 2024-07-30 NOTE — THERAPY TREATMENT NOTE
Acute Care - Physical Therapy Treatment Note  OFELIA Ku     Patient Name: Melania Centeno  : 1950  MRN: [id number]  Today's Date: 2024      Visit Dx:     ICD-10-CM ICD-9-CM   1. Difficulty in walking  R26.2 719.7   2. Left knee pain, unspecified chronicity  M25.562 719.46   3. Osteoarthritis of both knees, unspecified osteoarthritis type  M17.0 715.96   4. Decreased activities of daily living (ADL)  Z78.9 V49.89     Patient Active Problem List   Diagnosis    Pelvic organ prolapse quantification stage 3 cystocele    Pelvic organ prolapse quantification stage 1 rectocele    Uterine prolapse    Urge incontinence    Atrophic vaginitis    Left hip pain    Gastroesophageal reflux disease    Type 2 diabetes mellitus with hyperglycemia, without long-term current use of insulin    Essential hypertension    Mixed hyperlipidemia    Cervical spinal stenosis    Fusion of spine of cervical region    Degeneration of lumbar intervertebral disc    Encounter for long-term (current) use of insulin    Lumbar spondylosis    Neck pain    Other cirrhosis of liver    Elevated alkaline phosphatase level    Right upper quadrant abdominal pain    Osteoporosis    Encounter for colonoscopy following colon polyp removal    Left knee pain    Osteoarthritis of both knees    S/P TKR (total knee replacement), left     Past Medical History:   Diagnosis Date    Anesthesia     ptsd/pt has nightmares, but states no issues while waking  up  from  anesthesia    Arthritis     Cervical spinal stenosis 2019    Brisk reflexes suggestive of myelopathy/h/o cervical fusion    Cervicalgia     Depression     Diverticulitis of colon     Fatty liver     Foot pain     Fusion of spine of cervical region 2019    GERD (gastroesophageal reflux disease)     Hyperlipidemia     recent cardiology visit for surg clearance. no issues noted, no cp or soa    Hypertension     Leg pain     Psoriasis     PTSD (post-traumatic stress disorder)     Type 2  diabetes mellitus      Past Surgical History:   Procedure Laterality Date    BREAST BIOPSY      CERVICAL FUSION  03/14/2019    right C4-5,C5-6    CHOLECYSTECTOMY      COLONOSCOPY      COLONOSCOPY N/A 8/3/2023    Procedure: COLONOSCOPY WITH POLYPECTOMIES;  Surgeon: Neal Delaney MD;  Location: [facility] ENDOSCOPY;  Service: Gastroenterology;  Laterality: N/A;  COLON POLYPS, DIVERTICULOSIS    ENDOSCOPY N/A 8/3/2023    Procedure: ESOPHAGOGASTRODUODENOSCOPY WITH BIOPSIES;  Surgeon: Neal Delaney MD;  Location: [facility] ENDOSCOPY;  Service: Gastroenterology;  Laterality: N/A;  HIATAL HERNIA, REFLUX ESOPHAGITIS    HEMORRHOIDECTOMY      KNEE SURGERY      TOTAL KNEE ARTHROPLASTY Left 7/29/2024    Procedure: LEFT TOTAL KNEE ARTHROPLASTY;  Surgeon: Rajeev Yañez MD;  Location: [facility] MAIN OR;  Service: Orthopedics;  Laterality: Left;    TUBAL ABDOMINAL LIGATION       PT Assessment (Last 12 Hours)       PT Evaluation and Treatment       Row Name 07/30/24 1443          Physical Therapy Time and Intention    Subjective Information complains of;pain  -RH     Document Type therapy note (daily note)  -     Mode of Treatment individual therapy;group therapy;physical therapy  -     Patient Effort good  -RH     Comment Gait performed individually; therapuetic exercises performed in a group session with 4 participants  -       Row Name 07/30/24 1446          General Information    Patient Observations alert;cooperative;agree to therapy  -       Row Name 07/30/24 1448          Pain Scale: FACES Pre/Post-Treatment    Pain: FACES Scale, Pretreatment --  3/10  -RH     Posttreatment Pain Rating 2-->hurts little bit  -     Pain Location - Side/Orientation Left  -     Pain Location - knee  -RH       Row Name 07/30/24 1441          Range of Motion (ROM)    Range of Motion --  Pt L knee AAROM at 90 degrees flex and 8 degrees ext.  -       Row Name 07/30/24 1441          Strength (Manual Muscle Testing)    Strength  (Manual Muscle Testing) --  Pt L knee ext strength at 3-/5.  -RH       Row Name 07/30/24 1443          Mobility    Extremity Weight-bearing Status left lower extremity  -RH     Left Lower Extremity (Weight-bearing Status) weight-bearing as tolerated (WBAT)  -RH       Row Name 07/30/24 1443          Transfers    Transfers sit-stand transfer;stand-sit transfer  -RH       Row Name 07/30/24 1443          Sit-Stand Transfer    Sit-Stand [place] (Transfers) contact guard  -RH     Assistive Device (Sit-Stand Transfers) walker, front-wheeled  -RH       Row Name 07/30/24 1443          Stand-Sit Transfer    Stand-Sit [place] (Transfers) contact guard  -RH     Assistive Device (Stand-Sit Transfers) walker, front-wheeled  -RH       Row Name 07/30/24 1443          Gait/Stairs (Locomotion)    Gait/Stairs Locomotion gait/ambulation assistive device  -RH     [place] Level (Gait) contact guard  -RH     Assistive Device (Gait) walker, front-wheeled  -RH     Patient was able to Ambulate yes  -RH     Distance in Feet (Gait) 60  -RH     Pattern (Gait) 3-point;step-through  -RH     Deviations/Abnormal Patterns (Gait) base of support, narrow;gait speed decreased;stride length decreased  -RH     Bilateral Gait Deviations heel strike decreased  -RH     Gait Assessment/Intervention Pt amb with RW and CGA with 3 point step-through gait pattern with very short steps and slow pace.  -RH     Negotiation (Stairs) stairs independence;stairs assistive device;handrail location;number of steps;ascending technique;descending technique  -RH     [place] Level (Stairs) contact guard  -RH     Handrail Location (Stairs) both sides  -RH     Number of Steps (Stairs) 5  -RH     Ascending Technique (Stairs) step-to-step  -RH     Descending Technique (Stairs) step-to-step  -RH       Row Name 07/30/24 1443          Safety Issues, Functional Mobility    Impairments Affecting Function (Mobility) balance;pain;range of motion (ROM);strength  -RH        Row Name 07/30/24 1443          Balance    Balance Assessment standing dynamic balance  -     Dynamic Sitting Balance contact guard  -     Dynamic Standing Balance contact guard  -     Position/Device Used, Standing Balance walker, front-wheeled  -       Row Name 07/30/24 1443          Motor Skills    Therapeutic Exercise hip;knee;ankle  -       Row Name 07/30/24 1443          Hip (Therapeutic Exercise)    Hip (Therapeutic Exercise) isometric exercises  -     Hip Isometrics (Therapeutic Exercise) left;gluteal sets;10 repetitions;2 sets  -       Row Name 07/30/24 1443          Knee (Therapeutic Exercise)    Knee (Therapeutic Exercise) strengthening exercise;isometric exercises  -     Knee Isometrics (Therapeutic Exercise) left;quad sets;10 repetitions;2 sets  -RH     Knee Strengthening (Therapeutic Exercise) left;heel slides;SLR (straight leg raise);SAQ (short arc quad);LAQ (long arc quad);10 repetitions;2 sets  -       Row Name 07/30/24 1443          Ankle (Therapeutic Exercise)    Ankle (Therapeutic Exercise) AROM (active range of motion)  -     Ankle AROM (Therapeutic Exercise) left;dorsiflexion;plantarflexion;10 repetitions;2 sets  -       Row Name             Wound 07/29/24 1235 Left anterior knee Incision    Wound - Properties Group Placement Date: 07/29/24  -DW Placement Time: 1235  -DW Side: Left  -DW Orientation: anterior  -DW Location: knee  -DW Primary Wound Type: Incision  -DW    Retired Wound - Properties Group Placement Date: 07/29/24  -DW Placement Time: 1235  -DW Side: Left  -DW Orientation: anterior  -DW Location: knee  -DW Primary Wound Type: Incision  -DW    Retired Wound - Properties Group Date first assessed: 07/29/24  -DW Time first assessed: 1235  -DW Side: Left  -DW Location: knee  -DW Primary Wound Type: Incision  -DW      Row Name 07/30/24 1443          Positioning and Restraints    In Chair reclined;call light within reach;encouraged to call for assist;exit alarm  on;with family/caregiver  -RH       Row Name 07/30/24 1443          Progress Summary (PT)    Progress Toward Functional Goals (PT) progress toward functional goals is good  -RH     Daily Progress Summary (PT) Pt is progressing well with their exercise program.  Will continue current plan of care.  -RH               User Key  (r) = Recorded By, (t) = Taken By, (c) = Cosigned By      Initials Name Provider Type    RH Yash Ramos PTA Physical Therapist Assistant    Allison Schmid, RN Registered Nurse                    Physical Therapy Education       Title: PT OT SLP Therapies (Done)       Topic: Physical Therapy (Resolved)       Point: Mobility training (Resolved)       Learning Progress Summary             Patient Acceptance, TB,E, VU by  at 7/30/2024 1129    Acceptance, E,TB, VU by YAMILET at 7/29/2024 1454                         Point: Precautions (Resolved)       Learning Progress Summary             Patient Acceptance, TB,E, VU by  at 7/30/2024 1129    Acceptance, E,TB, VU by YAMILET at 7/29/2024 1454                                         User Key       Initials Effective Dates Name Provider Type Discipline    YAMILET 06/03/21 -  Ji Henson, PT Physical Therapist PT    RONY 06/05/23 -  Peyton Ambrosio, RN Registered Nurse Nurse                  PT Recommendation and Plan     Progress Summary (PT)  Progress Toward Functional Goals (PT): progress toward functional goals is good  Daily Progress Summary (PT): Pt is progressing well with their exercise program.  Will continue current plan of care.   Outcome Measures       Row Name 07/30/24 1400 07/29/24 1400          How much help from another person do you currently need...    Turning from your back to your side while in flat bed without using bedrails? 3  -RH 3  -YAMILET     Moving from lying on back to sitting on the side of a flat bed without bedrails? 2  -RH 2  -YAMILET     Moving to and from a bed to a chair (including a wheelchair)? 2  -RH 2  -YAMILET     Standing up  from a chair using your arms (e.g., wheelchair, bedside chair)? 2  -RH 2  -YAMILET     Climbing 3-5 steps with a railing? 3  -RH 2  -YAMILET     To walk in hospital room? 3  -RH 2  -YAMILET     AM-PAC 6 Clicks Score (PT) 15  -RH 13  -YAMILET     Highest Level of Mobility Goal 4 --> Transfer to chair/commode  -RH 4 --> Transfer to chair/commode  -YAMILET        Functional Assessment    Outcome Measure Options -- AM-PAC 6 Clicks Basic Mobility (PT)  -YAMILET               User Key  (r) = Recorded By, (t) = Taken By, (c) = Cosigned By      Initials Name Provider Type     Yash Ramos PTA Physical Therapist Assistant    Ji Nelson, PT Physical Therapist                     Time Calculation:    PT Charges       Row Name 07/30/24 1443             Time Calculation    PT Received On 07/30/24  -RH         Timed Charges    80379 - Gait Training Minutes  8  -RH      67397 - PT Therapeutic Activity Minutes 5  -RH         Untimed Charges    PT Group Therapy Minutes 30  -RH         Total Minutes    Timed Charges Total Minutes 13  -RH      Untimed Charges Total Minutes 30  -RH       Total Minutes 43  -RH                User Key  (r) = Recorded By, (t) = Taken By, (c) = Cosigned By      Initials Name Provider Type     Yash Ramos PTA Physical Therapist Assistant                  Therapy Charges for Today       Code Description Service Date Service Provider Modifiers Qty    51841479086 HC GAIT TRAINING EA 15 MIN 7/30/2024 Yash Ramos PTA GP 1    33624405173 HC PT THER PROC GROUP 7/30/2024 Yash Ramos PTA GP 1            PT G-Codes  Outcome Measure Options: AM-PAC 6 Clicks Daily Activity (OT), Optimal Instrument  AM-PAC 6 Clicks Score (PT): 15  AM-PAC 6 Clicks Score (OT): 20    Yash Ramos PTA  7/30/2024

## 2024-07-30 NOTE — PLAN OF CARE
Goal Outcome Evaluation:  Plan of Care Reviewed With: patient        Progress: improving  Outcome Evaluation: Pt is alert and oriented x4. Pt was able to work with PT and OT this morning before going home today. Pt's pain has been relieved with PRN pain medication throughout my shift. Pt is going to be having [facility] come out for therapy. Pt was able to walk 50 ft this morning with PT. Pt is going home with family to help take care of her. Pt's daughter is taking her home today.

## 2024-07-30 NOTE — DISCHARGE SUMMARY
[facility]   DISCHARGE SUMMARY    Patient Name: Melania Centeno  : 1950  MRN: [id number]    Date of Admission: 2024  Date of Discharge: 2024  Primary Care Physician: Javon Pascal MD    Consults       Date and Time Order Name Status Description    2024  2:56 PM Inpatient Nephrology Consult              Hospital Course     Presenting Problem:   Left knee pain, unspecified chronicity [M25.562]  Osteoarthritis of both knees, unspecified osteoarthritis type [M17.0]  S/P TKR (total knee replacement), left [Z96.652]    Active and resolved problems  Principal Problem:    S/P TKR (total knee replacement), left  Overview:  Active Problems:    Left knee pain  Overview:    Osteoarthritis of both knees  Overview:  Resolved Problems:    * No resolved hospital problems. *      Hospital Course:  Melania Centeno is a 74 y.o. female with PMH of arthritis, cervical spinal stenosis, GERD, HLD, HTN, PTSD, T2DM. She has had chronic bilateral knee pain and has failed conservative measures. She had elected to pursue left knee surgery and underwent total left knee arthroplasty with no complications. She will be discharged home with home health services and physical therapy.  She is to follow up outpt with ortho as directed.   I have personally seen the patient examined and confirm the findings and supervised discharge process    Vital Signs:  Temp:  [97.4 °F (36.3 °C)-98.2 °F (36.8 °C)] 98.1 °F (36.7 °C)  Heart Rate:  [57-79] 78  Resp:  [12-20] 16  BP: (134-161)/(56-86) 149/73  Flow (L/min):  [2] 2      Discharge Details        Discharge Medications        New Medications        Instructions Start Date   apixaban 2.5 MG tablet tablet  Commonly known as: ELIQUIS   2.5 mg, Oral, Every 12 Hours Scheduled, Once Eliquis is completed, begin enteric-coated aspirin 325 mg by mouth daily for 4 weeks      oxyCODONE-acetaminophen 7.5-325 MG per tablet  Commonly known as: PERCOCET   1-2 tablets, Oral, Every 4 Hours PRN              Continue These Medications        Instructions Start Date   amLODIPine 10 MG tablet  Commonly known as: NORVASC   10 mg, Oral, Daily      atorvastatin 10 MG tablet  Commonly known as: LIPITOR   10 mg, Oral, Daily      benazepril 40 MG tablet  Commonly known as: LOTENSIN   40 mg, Oral, Daily      benzonatate 200 MG capsule  Commonly known as: TESSALON   200 mg, Oral, 3 Times Daily PRN      clonazePAM 0.5 MG tablet  Commonly known as: KlonoPIN   Take 1 tablet by mouth 3 times a day.      Droplet Pen Needles 32G X 5 MM misc  Generic drug: Insulin Pen Needle   1 each, Subcutaneous, Daily      DULoxetine 60 MG capsule  Commonly known as: CYMBALTA   60 mg, Oral, Daily      FREESTYLE LITE test strip  Generic drug: glucose blood   Use with glucose meter to check blood sugars twice a day (DX. E11.65)      gabapentin 100 MG capsule  Commonly known as: NEURONTIN   Take 1 capsule by mouth 3 (Three) Times a Day As Needed.      glipizide 10 MG tablet  Commonly known as: GLUCOTROL   10 mg, Oral, 2 Times Daily Before Meals      HumuLIN N KwikPen 100 UNIT/ML injection  Generic drug: Insulin NPH (Human) (Isophane)   5-15 Units 3 (Three) Times a Day As Needed.      metoprolol succinate XL 25 MG 24 hr tablet  Commonly known as: TOPROL-XL   25 mg, Oral, Daily      omeprazole 40 MG capsule  Commonly known as: priLOSEC   40 mg, Oral, Daily, APPOINTMENT NEEDED FOR ADDITIONAL REFILLS      tiZANidine 4 MG tablet  Commonly known as: ZANAFLEX   4 mg, Oral, Every 8 Hours             Stop These Medications      HYDROcodone-acetaminophen  MG per tablet  Commonly known as: NORCO              No Known Allergies      Discharge Disposition:  Home-Health Care [facility]    Diet:  diabetic diet       Discharge Activity:   Activity Instructions       Up WIth Assist                CODE STATUS:    There are no questions and answers to display.         Future Appointments   Date Time Provider Department Center   8/13/2024  8:15 AM Christina Peres  [facility]S [facility]       Additional Instructions for the Follow-ups that You Need to Schedule       Ambulatory Referral to Home Health (Outpatient)   As directed      Face to Face Visit Date: 7/30/2024   Follow-up provider for Plan of Care?: I will be treating the patient on an ongoing basis.  Please send me the Plan of Care for signature.   Follow-up provider: SUSSY YAÑEZ [[id number]]   Reason/Clinical Findings: Status post left total knee   Describe mobility limitations that make leaving home difficult: Status post left total knee   Nursing/Therapeutic Services Requested: Skilled Nursing Physical Therapy Occupational Therapy   Skilled nursing orders: Pain management Wound care dressing/changes   PT orders: Total joint pathway Therapeutic exercise Gait Training Strengthening   Weight Bearing Status: As Tolerated   Occupational orders: Activities of daily living Home safety assessment Energy conservation Strengthening   Frequency: 1 Week 1        Discharge Follow-up with Specified Provider: Dr Yañez's office; 2 Weeks   As directed      To: Dr Yañez's office   Follow Up: 2 Weeks        Referral to Physical Therapy   As directed      2-3x/week for 8-12 weeks  + modalities    Order Comments: 2-3x/week for 8-12 weeks + modalities    Specialty needed: Evaluate and treat POST OP   Follow-up needed: Yes                Time spent on Discharge including face to face service:  30 minutes    Electronically signed by CHINA Means, 07/30/24, 8:58 AM EDT.

## 2024-07-30 NOTE — PLAN OF CARE
Goal Outcome Evaluation:  Plan of Care Reviewed With: patient        Progress: improving (Through ADL/transfer retraining, patient is now able to perform bathing min assist.)  Outcome Evaluation: Patient presents with limitations of balance and standing endurance/ activity tolerance which are impacting ADL/ transfer independence. Skilled OT services are indicated to remediate/ compensate for deficits to maximize independence and safety with functional tasks.      Anticipated Discharge Disposition (OT): home with assist, home with home health (granddaughter to assist at home.)

## 2024-07-30 NOTE — PLAN OF CARE
Goal Outcome Evaluation:  Plan of Care Reviewed With: patient, spouse           Outcome Evaluation: Patient alert and oriented at shift change. Ambulating with assistance and voiding without issue. Pain controlled with PRN medications. Blood sugar monitored and insulin given as ordered.

## 2024-07-30 NOTE — PROGRESS NOTES
Orthopedic Total Knee Progress Note    Assessment/Plan requesting home health, follow-up 2 weeks, DVT prophylaxis    Status post-left total knee arthroplasty: Doing well postoperatively.    Pain Relief: some relief    Continues current post-op course    Activity: up with assistance    Weight Bearing: WBAT     LOS: 0 days     Subjective     Post-Operative Day: 1 post-left total knee arthroplasty  Systemic or Specific Complaints: No Complaints    Objective     Vital signs in last 24 hours:  Vitals:    07/29/24 2308 07/29/24 2322 07/30/24 0333 07/30/24 0700   BP:  161/66 141/86 149/73   BP Location:  Left arm Left arm Left arm   Patient Position:  Lying Lying Sitting   Pulse:  79 78 78   Resp:  18 20 16   Temp:  97.9 °F (36.6 °C) 97.9 °F (36.6 °C) 98.1 °F (36.7 °C)   TempSrc:  Oral Oral Oral   SpO2: 98% 98% 93% 94%   Weight:       Height:            General: alert, appears stated age, and cooperative   Neurovascular: Tibial nerve: Intact, Superficial peroneal nerve: Intact, and Deep peroneal nerve: Intact  Capillary refill: Normal   Wound: Wound clean and dry no evidence of infection.   Range of Motion: Limited flexsion and Limited extension   DVT Exam: No evidence of DVT seen on physical exam.      WBC   Date Value Ref Range Status   07/30/2024 11.17 (H) 3.40 - 10.80 10*3/mm3 Final     RBC   Date Value Ref Range Status   07/30/2024 4.22 3.77 - 5.28 10*6/mm3 Final     Hemoglobin   Date Value Ref Range Status   07/30/2024 12.3 12.0 - 15.9 g/dL Final     Hematocrit   Date Value Ref Range Status   07/30/2024 37.4 34.0 - 46.6 % Final     MCV   Date Value Ref Range Status   07/30/2024 88.6 79.0 - 97.0 fL Final     MCH   Date Value Ref Range Status   07/30/2024 29.1 26.6 - 33.0 pg Final     MCHC   Date Value Ref Range Status   07/30/2024 32.9 31.5 - 35.7 g/dL Final     RDW   Date Value Ref Range Status   07/30/2024 14.0 12.3 - 15.4 % Final     RDW-SD   Date Value Ref Range Status   07/30/2024 45.1 37.0 - 54.0 fl Final  "    MPV   Date Value Ref Range Status   07/30/2024 11.7 6.0 - 12.0 fL Final     Platelets   Date Value Ref Range Status   07/30/2024 105 (L) 140 - 450 10*3/mm3 Final        Basic Metabolic Panel    Sodium Sodium   Date Value Ref Range Status   07/30/2024 134 (L) 136 - 145 mmol/L Final      Potassium Potassium   Date Value Ref Range Status   07/30/2024 4.4 3.5 - 5.2 mmol/L Final      Chloride Chloride   Date Value Ref Range Status   07/30/2024 99 98 - 107 mmol/L Final      Bicarbonate No results found for: \"PLASMABICARB\"   BUN BUN   Date Value Ref Range Status   07/30/2024 14 8 - 23 mg/dL Final      Creatinine Creatinine   Date Value Ref Range Status   07/30/2024 0.69 0.57 - 1.00 mg/dL Final      Calcium Calcium   Date Value Ref Range Status   07/30/2024 8.9 8.6 - 10.5 mg/dL Final      Glucose      No components found for: \"GLUCOSE.*\"      XR Knee 1 or 2 View Left   Final Result   Impression:      1. Status post left total knee arthroplasty.   2. 1 cm ossific density seen on the lateral image in the posterior compartment does not appear to be a fabella and may be within the joint.         Electronically Signed: Aubrey Young MD     7/29/2024 2:47 PM EDT     Workstation ID: [id number]           "

## 2024-07-30 NOTE — PLAN OF CARE
Goal Outcome Evaluation:  Plan of Care Reviewed With: patient        Progress: no change  Outcome Evaluation: pt is a one person assist to ambulate with walker.  voiding without difficulty.  pt. has been medicated with scheduled and prn pain medication with relief noted.  pt. has been medicated x 2 for nausea this shift with relief noted.  upon discharge pt. is going home with home health coming in.

## 2024-07-31 ENCOUNTER — TELEPHONE (OUTPATIENT)
Dept: ORTHOPEDIC SURGERY | Facility: CLINIC | Age: 74
End: 2024-07-31
Payer: MEDICARE

## 2024-07-31 NOTE — THERAPY DISCHARGE NOTE
Acute Care - Occupational Therapy Discharge   [place]    Patient Name: Melania Centeno  : 1950    MRN: [id number]                              Today's Date: 2024       Admit Date: 2024    Visit Dx:     ICD-10-CM ICD-9-CM   1. Difficulty in walking  R26.2 719.7   2. Left knee pain, unspecified chronicity  M25.562 719.46   3. Osteoarthritis of both knees, unspecified osteoarthritis type  M17.0 715.96   4. Decreased activities of daily living (ADL)  Z78.9 V49.89     Patient Active Problem List   Diagnosis    Pelvic organ prolapse quantification stage 3 cystocele    Pelvic organ prolapse quantification stage 1 rectocele    Uterine prolapse    Urge incontinence    Atrophic vaginitis    Left hip pain    Gastroesophageal reflux disease    Type 2 diabetes mellitus with hyperglycemia, without long-term current use of insulin    Essential hypertension    Mixed hyperlipidemia    Cervical spinal stenosis    Fusion of spine of cervical region    Degeneration of lumbar intervertebral disc    Encounter for long-term (current) use of insulin    Lumbar spondylosis    Neck pain    Other cirrhosis of liver    Elevated alkaline phosphatase level    Right upper quadrant abdominal pain    Osteoporosis    Encounter for colonoscopy following colon polyp removal    Left knee pain    Osteoarthritis of both knees    S/P TKR (total knee replacement), left     Past Medical History:   Diagnosis Date    Anesthesia     ptsd/pt has nightmares, but states no issues while waking  up  from  anesthesia    Arthritis     Cervical spinal stenosis 2019    Brisk reflexes suggestive of myelopathy/h/o cervical fusion    Cervicalgia     Depression     Diverticulitis of colon     Fatty liver     Foot pain     Fusion of spine of cervical region 2019    GERD (gastroesophageal reflux disease)     Hyperlipidemia     recent cardiology visit for surg clearance. no issues noted, no cp or soa    Hypertension     Leg pain     Psoriasis      PTSD (post-traumatic stress disorder)     Type 2 diabetes mellitus      Past Surgical History:   Procedure Laterality Date    BREAST BIOPSY      CERVICAL FUSION  03/14/2019    right C4-5,C5-6    CHOLECYSTECTOMY      COLONOSCOPY      COLONOSCOPY N/A 8/3/2023    Procedure: COLONOSCOPY WITH POLYPECTOMIES;  Surgeon: Neal Delaney MD;  Location: [facility] ENDOSCOPY;  Service: Gastroenterology;  Laterality: N/A;  COLON POLYPS, DIVERTICULOSIS    ENDOSCOPY N/A 8/3/2023    Procedure: ESOPHAGOGASTRODUODENOSCOPY WITH BIOPSIES;  Surgeon: Neal Delaney MD;  Location: [facility] ENDOSCOPY;  Service: Gastroenterology;  Laterality: N/A;  HIATAL HERNIA, REFLUX ESOPHAGITIS    HEMORRHOIDECTOMY      KNEE SURGERY      TOTAL KNEE ARTHROPLASTY Left 7/29/2024    Procedure: LEFT TOTAL KNEE ARTHROPLASTY;  Surgeon: Rajeev Yañez MD;  Location: [facility] MAIN OR;  Service: Orthopedics;  Laterality: Left;    TUBAL ABDOMINAL LIGATION        General Information    No documentation.                  Mobility/ADL's    No documentation.                  Obj/Interventions    No documentation.                  Goals/Plan       Row Name 07/31/24 0929          Transfer Goal 1 (OT)    Activity/Assistive Device (Transfer Goal 1, OT) transfers, all;walker, rolling  -AV     [place] Level/Cues Needed (Transfer Goal 1, OT) modified independence  -AV     Time Frame (Transfer Goal 1, OT) long term goal (LTG);10 days  -AV     Progress/Outcome (Transfer Goal 1, OT) goal not met;discharged from facility  -AV       Row Name 07/31/24 0929          Bathing Goal 1 (OT)    Activity/Device (Bathing Goal 1, OT) bathing skills, all;shower chair  -AV     [place] Level/Cues Needed (Bathing Goal 1, OT) modified independence  -AV     Time Frame (Bathing Goal 1, OT) long term goal (LTG);10 days  -AV     Progress/Outcomes (Bathing Goal 1, OT) goal not met;discharged from facility  -AV       Row Name 07/31/24 0929          Dressing Goal 1 (OT)     [place]/Cues Needed (Dressing Goal 1, OT) modified independence  -AV     Time Frame (Dressing Goal 1, OT) long term goal (LTG);10 days  -AV     Progress/Outcome (Dressing Goal 1, OT) goal not met;discharged from facility  -AV       Row Name 07/31/24 0929          Toileting Goal 1 (OT)    Activity/Device (Toileting Goal 1, OT) toileting skills, all;raised toilet seat  -AV     [place] Level/Cues Needed (Toileting Goal 1, OT) modified independence  -AV     Time Frame (Toileting Goal 1, OT) long term goal (LTG);10 days  -AV     Progress/Outcome (Toileting Goal 1, OT) goal not met;discharged from facility  -AV       Row Name 07/31/24 0929          Grooming Goal 1 (OT)    Activity/Device (Grooming Goal 1, OT) grooming skills, all  -AV     [place] (Grooming Goal 1, OT) modified independence  -AV     Time Frame (Grooming Goal 1, OT) long term goal (LTG);10 days  -AV     Progress/Outcome (Grooming Goal 1, OT) goal not met;discharged from facility  -AV       Row Name 07/31/24 0929          Problem Specific Goal 1 (OT)    Problem Specific Goal 1 (OT) Patient will demonstrate fair plus endurance/ activity tolerance needed to support ADLs.  -AV     Time Frame (Problem Specific Goal 1, OT) long term goal (LTG);10 days  -AV     Progress/Outcome (Problem Specific Goal 1, OT) goal not met;discharged from facility  -AV               User Key  (r) = Recorded By, (t) = Taken By, (c) = Cosigned By      Initials Name Provider Type    Robe Ingram OT Occupational Therapist                   Clinical Impression    No documentation.                  Outcome Measures    No documentation.                 Occupational Therapy Education       Title: PT OT SLP Therapies (Done)       Topic: Occupational Therapy (Done)       Point: ADL training (Done)       Description:   Instruct learner(s) on proper safety adaptation and remediation techniques during self care or transfers.   Instruct in proper use of assistive devices.                   Learning Progress Summary             Patient Acceptance, E, VU by AV at 7/30/2024 1322    Comment: WBAT  need for staff assistance for all standing ADL/ transfers  safe positioning ADLs  safe transfer techniques  adaptive techniques for lower body ADLs  home safety/ adaptive equipment recommendations                         Point: Home exercise program (Done)       Description:   Instruct learner(s) on appropriate technique for monitoring, assisting and/or progressing therapeutic exercises/activities.                  Learning Progress Summary             Patient Acceptance, E, VU by AV at 7/30/2024 1322    Comment: WBAT  need for staff assistance for all standing ADL/ transfers  safe positioning ADLs  safe transfer techniques  adaptive techniques for lower body ADLs  home safety/ adaptive equipment recommendations                         Point: Precautions (Done)       Description:   Instruct learner(s) on prescribed precautions during self-care and functional transfers.                  Learning Progress Summary             Patient Acceptance, E, VU by AV at 7/30/2024 1322    Comment: WBAT  need for staff assistance for all standing ADL/ transfers  safe positioning ADLs  safe transfer techniques  adaptive techniques for lower body ADLs  home safety/ adaptive equipment recommendations                         Point: Body mechanics (Done)       Description:   Instruct learner(s) on proper positioning and spine alignment during self-care, functional mobility activities and/or exercises.                  Learning Progress Summary             Patient Acceptance, E, VU by AV at 7/30/2024 1322    Comment: WBAT  need for staff assistance for all standing ADL/ transfers  safe positioning ADLs  safe transfer techniques  adaptive techniques for lower body ADLs  home safety/ adaptive equipment recommendations                                         User Key       Initials Effective Dates Name Provider Type  Discipline    AV 06/16/21 -  Robe Alexander OT Occupational Therapist OT                  OT Recommendation and Plan  Planned Therapy Interventions (OT): activity tolerance training, BADL retraining, functional balance retraining, occupation/activity based interventions, patient/caregiver education/training, transfer/mobility retraining  Therapy Frequency (OT): 5 times/wk  Plan of Care Review  Plan of Care Reviewed With: patient  Progress: improving (Through ADL/transfer retraining, patient is now able to perform bathing min assist.)  Outcome Evaluation: Patient presents with limitations of balance and standing endurance/ activity tolerance which are impacting ADL/ transfer independence. Skilled OT services are indicated to remediate/ compensate for deficits to maximize independence and safety with functional tasks.  Plan of Care Reviewed With: patient  Outcome Evaluation: Patient presents with limitations of balance and standing endurance/ activity tolerance which are impacting ADL/ transfer independence. Skilled OT services are indicated to remediate/ compensate for deficits to maximize independence and safety with functional tasks.     Time Calculation:   Evaluation Complexity (OT)  Review Occupational Profile/Medical/Therapy History Complexity: expanded/moderate complexity  Assessment, Occupational Performance/Identification of Deficit Complexity: 1-3 performance deficits  Clinical Decision Making Complexity (OT): problem focused assessment/low complexity  Overall Complexity of Evaluation (OT): low complexity      Therapy Charges for Today       Code Description Service Date Service Provider Modifiers Qty    [id number]  OT SELF CARE/MGMT/TRAIN EA 15 MIN 7/30/2024 Robe Alexander OT GO 2    [id number]  OT EVAL LOW COMPLEXITY 3 7/30/2024 Robe Alexander OT GO 1               OT Discharge Summary  Anticipated Discharge Disposition (OT): home with assist, home with home health (granddaughter to assist at  home.)  Reason for Discharge: Discharge from facility  Outcomes Achieved: Discharge from facility occurred on same date as josé Alexander OT  7/31/2024

## 2024-07-31 NOTE — TELEPHONE ENCOUNTER
PATIENT'S DAUGHTER CALLED SAYING THAT THEY WERE VERY UNHAPPY WITH THE THERAPIST THAT CAME TO THE HOME. PATIENT GAVE VERBAL OK TO TALK TO HER DAUGHTER ABOUT THIS ISSUE. DAUGHTER WAS GIVEN THE [email or web address] [facility], [employer] AND THE NUMBER [phone] TO CALL AND DISCUSS THE ISSUE. SHE WAS ENCOURAGED TO CALL US BACK IF WE CAN BE OF FURTHER ASSISTANCE.

## 2024-08-02 ENCOUNTER — TELEPHONE (OUTPATIENT)
Dept: ORTHOPEDIC SURGERY | Facility: CLINIC | Age: 74
End: 2024-08-02
Payer: MEDICARE

## 2024-08-02 NOTE — TELEPHONE ENCOUNTER
PATIENT STATES SHE RAN OUT OF PAIN MEDICATION TODAY. UPON INSTRUCTING HER THAT OUR PRESCRIPTION WOULD NEED TO LAST HER A WEEK SHE VOICED THAT SHE WOULD RATHER CONTINUE WITH THE [facility] 10/325MG THROUGH DR. CARMONA.   I LET HER KNOW THAT WILL BE FINE AND WE WILL NOT BE WRITING ANY FURTHER PAIN MEDICATION POST OPERATIVELY. SHE VOICED UNDERSTANDING.

## 2024-08-08 ENCOUNTER — TELEPHONE (OUTPATIENT)
Dept: ORTHOPEDIC SURGERY | Facility: CLINIC | Age: 74
End: 2024-08-08

## 2024-08-08 NOTE — TELEPHONE ENCOUNTER
Caller: [facility] [employer]    Relationship:     Best call back number: [phone]    What is the best time to reach you: ANY     Who are you requesting to speak with (clinical staff, provider,  specific staff member): CLINICAL     Do you know the name of the person who called: YES     What was the call regarding: STATES THAT THE PATIENT HAS BEEN REFUSING HOME HEALTH VISITS

## 2024-08-13 ENCOUNTER — TELEPHONE (OUTPATIENT)
Dept: ORTHOPEDIC SURGERY | Facility: CLINIC | Age: 74
End: 2024-08-13
Payer: MEDICARE

## 2024-08-13 ENCOUNTER — OFFICE VISIT (OUTPATIENT)
Dept: ORTHOPEDIC SURGERY | Facility: CLINIC | Age: 74
End: 2024-08-13
Payer: MEDICARE

## 2024-08-13 VITALS
BODY MASS INDEX: 39.35 KG/M2 | WEIGHT: 213.85 LBS | HEART RATE: 68 BPM | OXYGEN SATURATION: 96 % | SYSTOLIC BLOOD PRESSURE: 146 MMHG | DIASTOLIC BLOOD PRESSURE: 68 MMHG | HEIGHT: 62 IN

## 2024-08-13 DIAGNOSIS — Z96.652 S/P TOTAL KNEE ARTHROPLASTY, LEFT: Primary | ICD-10-CM

## 2024-08-13 DIAGNOSIS — M25.562 LEFT KNEE PAIN, UNSPECIFIED CHRONICITY: ICD-10-CM

## 2024-08-13 PROCEDURE — 3078F DIAST BP <80 MM HG: CPT

## 2024-08-13 PROCEDURE — 99024 POSTOP FOLLOW-UP VISIT: CPT

## 2024-08-13 PROCEDURE — 1159F MED LIST DOCD IN RCRD: CPT

## 2024-08-13 PROCEDURE — 3077F SYST BP >= 140 MM HG: CPT

## 2024-08-13 PROCEDURE — 1160F RVW MEDS BY RX/DR IN RCRD: CPT

## 2024-08-13 RX ORDER — HYDROCODONE BITARTRATE AND ACETAMINOPHEN 10; 325 MG/1; MG/1
TABLET ORAL
COMMUNITY
Start: 2024-08-12

## 2024-08-13 NOTE — PROGRESS NOTES
"Chief Complaint  Pain and Follow-up of the Left Knee and Suture / Staple Removal    Subjective      Melania Centeno presents to [facility] for follow up of her right knee.  Patient is 2 weeks status post right total knee arthroplasty performed by Dr. Yañez on 7/29/2024.  Patient arrived today with use of walker.  She states overall she is doing okay.  She has been taking her prescription pain medication that has been provided to her through pain management.  She states that she is icing frequently.  She has home health physical therapy coming to her house.  She states that she has been trying to keep her swelling down but did have a baseline of swelling before surgery in her lower extremity.    No Known Allergies    Objective     Vital Signs:   Vitals:    08/13/24 0815   BP: 146/68   Pulse: 68   SpO2: 96%   Weight: 97 kg (213 lb 13.5 oz)   Height: 157.5 cm (62\")     Body mass index is 39.11 kg/m².    I reviewed the patient's chief complaint, history of present illness, review of systems, past medical history, surgical history, family history, social history, medications, and allergy list.     REVIEW OF SYSTEMS    Constitutional: Denies fevers, chills, weight loss  Cardiovascular: Denies chest pain, shortness of breath  Skin: Denies rashes, acute skin changes  Neurologic: Denies headache, loss of consciousness  MSK: Right knee pain.     Ortho Exam  Knee   General: Alert, no acute distress.   Right knee: No pain with passive hip ROM.  Staples removed today in office without complication. Steri-strips applied, incision is clean, dry and intact, without swelling, redness or drainage.  Knee stable to varus/valgus stress.  Knee extensor mechanism intact. -5 degrees knee extension. Flexion to 85 degrees. Calf soft, non-tender.  Sensation and neurovascularly intact.  Demonstrates active ankle dorsiflexion and plantarflexion.  Palpable pedal pulses.               Imaging Results (Most Recent)  "      Procedure Component Value Units Date/Time    XR Knee 3 View Left [[id number]] Resulted: 08/13/24 0836     Updated: 08/13/24 0837    Narrative:      X-Ray Report:  Study: X-rays ordered, taken in the office, and reviewed today.   Site: Left knee Xray  Indication: Left total knee arthroplasty follow up.   View: AP/Lateral, Standing, and [place] view(s)  Findings: Intact left total knee arthroplasty. No evidence of hardware   malfunction, complication or loosening. No periprosthetic fractures   visualized. Patella is midline tracking on sunrise view. Retained staple   removed prior to patient leaving clinic.   Prior studies available for comparison: yes                    Assessment and Plan   Diagnoses and all orders for this visit:    1. S/P total knee arthroplasty, left (Primary)    2. Left knee pain, unspecified chronicity  -     XR Knee 3 View Left         Melania Centeno presents today 2 weeks post op right total knee arthroplasty performed by Dr. Yañez on 8/29/2022 for. X-rays were reviewed with the patient today. Sutures/staples removed today without complications. Incisions are well healing without active drainage or concerns for infection. Incision site care was reviewed today. Patient instructed not to soak or submerge incisions. Do not apply any lotions, creams, or ointments to the incisions at this time.      Patient instructed to continue with formal physical therapy and the use of cane/walker until recommended by PT to discontinue. We discussed the importance of home exercises in addition to PT. We discussed swelling management with continued icing of the knee for approximately 15-20 minutes, three times daily, and as needed.     We discussed the importance of weaning from narcotic medications. Patient expressed understanding.  Patient will continue ASA for DVT prophylaxis until prescription completed.      Patient will follow-up in 4 weeks for reevaluation.  We do not need xrays at this visit.      Call or return if symptoms worsen or patient has any concerns.       Tobacco Use: Low Risk  (8/13/2024)    Patient History     Smoking Tobacco Use: Never     Smokeless Tobacco Use: Never     Passive Exposure: Not on file     Patient reports that they are a nonsmoker; cessation education not applicable.       Follow Up   Return in about 1 month (around 9/13/2024).  There are no Patient Instructions on file for this visit.  Patient was given instructions and counseling regarding her condition or for health maintenance advice. Please see specific information pulled into the AVS if appropriate.       Dictated Utilizing Dragon Dictation. Please note that portions of this note were completed with a voice recognition program. Part of this note may be an electronic transcription/translation of spoken language to printed text using the Dragon Dictation System.

## 2024-08-13 NOTE — TELEPHONE ENCOUNTER
TORIBIO AT [facility] CALLED TO STATE PATIENT IS REFUSING HOME HEALTH AND PT SERVICES MULTIPLE TIMES.

## 2024-08-14 ENCOUNTER — TELEPHONE (OUTPATIENT)
Dept: ORTHOPEDIC SURGERY | Facility: CLINIC | Age: 74
End: 2024-08-14
Payer: MEDICARE

## 2024-08-14 NOTE — TELEPHONE ENCOUNTER
TORIBIO, PT WITH HOME HEALTH CALLED TO REPORT THAT PATIENT WAS NOT HOME FOR HIS VISIT TODAY. SHE TOLD HIM SHE WAS VISITING HER GRANDDAUGHTER AND WOULD NOT BE HOME THE REST OF THE WEEK FOR PT VISITS. TORIBIO STATES THEY WILL LIKELY DISCHARGE PATIENT FROM HOME HEALTH SERVICES SOON FOR NON-COMPLIANCE. I LET HIM KNOW TO GIVE US A CALL IF SO.

## 2024-08-21 DIAGNOSIS — Z96.652 AFTERCARE FOLLOWING LEFT KNEE JOINT REPLACEMENT SURGERY: Primary | ICD-10-CM

## 2024-08-21 DIAGNOSIS — Z47.1 AFTERCARE FOLLOWING LEFT KNEE JOINT REPLACEMENT SURGERY: Primary | ICD-10-CM

## 2024-09-25 ENCOUNTER — APPOINTMENT (OUTPATIENT)
Dept: GENERAL RADIOLOGY | Facility: HOSPITAL | Age: 74
End: 2024-09-25
Payer: MEDICARE

## 2024-09-25 ENCOUNTER — HOSPITAL ENCOUNTER (INPATIENT)
Facility: HOSPITAL | Age: 74
LOS: 1 days | Discharge: LEFT BEFORE DISCHARGE | End: 2024-09-25
Attending: EMERGENCY MEDICINE | Admitting: STUDENT IN AN ORGANIZED HEALTH CARE EDUCATION/TRAINING PROGRAM
Payer: MEDICARE

## 2024-09-25 VITALS
WEIGHT: 200 LBS | RESPIRATION RATE: 20 BRPM | HEART RATE: 89 BPM | DIASTOLIC BLOOD PRESSURE: 67 MMHG | TEMPERATURE: 98 F | BODY MASS INDEX: 37.76 KG/M2 | SYSTOLIC BLOOD PRESSURE: 145 MMHG | HEIGHT: 61 IN | OXYGEN SATURATION: 92 %

## 2024-09-25 DIAGNOSIS — R60.0 PERIPHERAL EDEMA: ICD-10-CM

## 2024-09-25 DIAGNOSIS — R06.02 SHORTNESS OF BREATH: Primary | ICD-10-CM

## 2024-09-25 DIAGNOSIS — J81.0 ACUTE PULMONARY EDEMA: ICD-10-CM

## 2024-09-25 LAB
ALBUMIN SERPL-MCNC: 3.7 G/DL (ref 3.5–5.2)
ALBUMIN/GLOB SERPL: 0.9 G/DL
ALP SERPL-CCNC: 126 U/L (ref 39–117)
ALT SERPL W P-5'-P-CCNC: 11 U/L (ref 1–33)
ANION GAP SERPL CALCULATED.3IONS-SCNC: 12.1 MMOL/L (ref 5–15)
ARTERIAL PATENCY WRIST A: ABNORMAL
AST SERPL-CCNC: 26 U/L (ref 1–32)
ATMOSPHERIC PRESS: 741.8 MMHG
BASE EXCESS BLDA CALC-SCNC: -0.5 MMOL/L (ref -2–2)
BASOPHILS # BLD AUTO: 0.05 10*3/MM3 (ref 0–0.2)
BASOPHILS NFR BLD AUTO: 0.5 % (ref 0–1.5)
BDY SITE: ABNORMAL
BILIRUB SERPL-MCNC: 1 MG/DL (ref 0–1.2)
BUN SERPL-MCNC: 16 MG/DL (ref 8–23)
BUN/CREAT SERPL: 23.2 (ref 7–25)
CALCIUM SPEC-SCNC: 8.8 MG/DL (ref 8.6–10.5)
CHLORIDE SERPL-SCNC: 102 MMOL/L (ref 98–107)
CO2 SERPL-SCNC: 22.9 MMOL/L (ref 22–29)
CREAT SERPL-MCNC: 0.69 MG/DL (ref 0.57–1)
DEPRECATED RDW RBC AUTO: 50.7 FL (ref 37–54)
EGFRCR SERPLBLD CKD-EPI 2021: 91.8 ML/MIN/1.73
EOSINOPHIL # BLD AUTO: 0.19 10*3/MM3 (ref 0–0.4)
EOSINOPHIL NFR BLD AUTO: 2 % (ref 0.3–6.2)
ERYTHROCYTE [DISTWIDTH] IN BLOOD BY AUTOMATED COUNT: 15.3 % (ref 12.3–15.4)
GAS FLOW AIRWAY: 2 LPM
GLOBULIN UR ELPH-MCNC: 4.2 GM/DL
GLUCOSE SERPL-MCNC: 208 MG/DL (ref 65–99)
HCO3 BLDA-SCNC: 23.9 MMOL/L (ref 22–26)
HCT VFR BLD AUTO: 39.2 % (ref 34–46.6)
HCT VFR BLD CALC: 39 % (ref 38–51)
HEMODILUTION: NO
HGB BLD-MCNC: 12.4 G/DL (ref 12–15.9)
HGB BLDA-MCNC: 13.2 G/DL (ref 12–18)
HOLD SPECIMEN: NORMAL
HOLD SPECIMEN: NORMAL
IMM GRANULOCYTES # BLD AUTO: 0.03 10*3/MM3 (ref 0–0.05)
IMM GRANULOCYTES NFR BLD AUTO: 0.3 % (ref 0–0.5)
INHALED O2 CONCENTRATION: 28 %
LYMPHOCYTES # BLD AUTO: 2.42 10*3/MM3 (ref 0.7–3.1)
LYMPHOCYTES NFR BLD AUTO: 25.9 % (ref 19.6–45.3)
MCH RBC QN AUTO: 28.7 PG (ref 26.6–33)
MCHC RBC AUTO-ENTMCNC: 31.6 G/DL (ref 31.5–35.7)
MCV RBC AUTO: 90.7 FL (ref 79–97)
MODALITY: ABNORMAL
MONOCYTES # BLD AUTO: 0.81 10*3/MM3 (ref 0.1–0.9)
MONOCYTES NFR BLD AUTO: 8.7 % (ref 5–12)
NEUTROPHILS NFR BLD AUTO: 5.84 10*3/MM3 (ref 1.7–7)
NEUTROPHILS NFR BLD AUTO: 62.6 % (ref 42.7–76)
NRBC BLD AUTO-RTO: 0 /100 WBC (ref 0–0.2)
NT-PROBNP SERPL-MCNC: 780 PG/ML (ref 0–900)
PCO2 BLDA: 37.5 MM HG (ref 35–45)
PH BLDA: 7.41 PH UNITS (ref 7.35–7.45)
PLATELET # BLD AUTO: 165 10*3/MM3 (ref 140–450)
PMV BLD AUTO: 11.9 FL (ref 6–12)
PO2 BLD: 205 MM[HG] (ref 0–500)
PO2 BLDA: 57.4 MM HG (ref 80–100)
POTASSIUM SERPL-SCNC: 4.2 MMOL/L (ref 3.5–5.2)
PROT SERPL-MCNC: 7.9 G/DL (ref 6–8.5)
RBC # BLD AUTO: 4.32 10*6/MM3 (ref 3.77–5.28)
SAO2 % BLDCOA: 89.9 % (ref 95–99)
SODIUM SERPL-SCNC: 137 MMOL/L (ref 136–145)
TROPONIN T SERPL HS-MCNC: 11 NG/L
WBC NRBC COR # BLD AUTO: 9.34 10*3/MM3 (ref 3.4–10.8)
WHOLE BLOOD HOLD COAG: NORMAL
WHOLE BLOOD HOLD SPECIMEN: NORMAL

## 2024-09-25 PROCEDURE — 96374 THER/PROPH/DIAG INJ IV PUSH: CPT

## 2024-09-25 PROCEDURE — 99291 CRITICAL CARE FIRST HOUR: CPT

## 2024-09-25 PROCEDURE — 84484 ASSAY OF TROPONIN QUANT: CPT | Performed by: EMERGENCY MEDICINE

## 2024-09-25 PROCEDURE — 25010000002 FUROSEMIDE PER 20 MG: Performed by: EMERGENCY MEDICINE

## 2024-09-25 PROCEDURE — 71045 X-RAY EXAM CHEST 1 VIEW: CPT

## 2024-09-25 PROCEDURE — 93005 ELECTROCARDIOGRAM TRACING: CPT | Performed by: EMERGENCY MEDICINE

## 2024-09-25 PROCEDURE — 83880 ASSAY OF NATRIURETIC PEPTIDE: CPT | Performed by: EMERGENCY MEDICINE

## 2024-09-25 PROCEDURE — 80053 COMPREHEN METABOLIC PANEL: CPT | Performed by: EMERGENCY MEDICINE

## 2024-09-25 PROCEDURE — 85025 COMPLETE CBC W/AUTO DIFF WBC: CPT | Performed by: EMERGENCY MEDICINE

## 2024-09-25 PROCEDURE — 82803 BLOOD GASES ANY COMBINATION: CPT | Performed by: EMERGENCY MEDICINE

## 2024-09-25 PROCEDURE — 36600 WITHDRAWAL OF ARTERIAL BLOOD: CPT | Performed by: EMERGENCY MEDICINE

## 2024-09-25 PROCEDURE — 93010 ELECTROCARDIOGRAM REPORT: CPT | Performed by: INTERNAL MEDICINE

## 2024-09-25 RX ORDER — METOPROLOL SUCCINATE 25 MG/1
25 TABLET, EXTENDED RELEASE ORAL DAILY
Status: CANCELLED | OUTPATIENT
Start: 2024-09-25

## 2024-09-25 RX ORDER — NALOXONE HCL 0.4 MG/ML
0.4 VIAL (ML) INJECTION
Status: CANCELLED | OUTPATIENT
Start: 2024-09-25

## 2024-09-25 RX ORDER — LISINOPRIL 10 MG/1
40 TABLET ORAL
Status: CANCELLED | OUTPATIENT
Start: 2024-09-25

## 2024-09-25 RX ORDER — PANTOPRAZOLE SODIUM 40 MG/1
40 TABLET, DELAYED RELEASE ORAL
Status: CANCELLED | OUTPATIENT
Start: 2024-09-26

## 2024-09-25 RX ORDER — ACETAMINOPHEN 325 MG/1
650 TABLET ORAL EVERY 4 HOURS PRN
Status: CANCELLED | OUTPATIENT
Start: 2024-09-25

## 2024-09-25 RX ORDER — NYSTATIN 100000 U/G
1 CREAM TOPICAL 2 TIMES DAILY PRN
COMMUNITY
Start: 2024-08-29

## 2024-09-25 RX ORDER — ACETAMINOPHEN 650 MG/1
650 SUPPOSITORY RECTAL EVERY 4 HOURS PRN
Status: CANCELLED | OUTPATIENT
Start: 2024-09-25

## 2024-09-25 RX ORDER — BISACODYL 5 MG/1
5 TABLET, DELAYED RELEASE ORAL DAILY PRN
Status: CANCELLED | OUTPATIENT
Start: 2024-09-25

## 2024-09-25 RX ORDER — ATORVASTATIN CALCIUM 10 MG/1
10 TABLET, FILM COATED ORAL DAILY
Status: CANCELLED | OUTPATIENT
Start: 2024-09-25

## 2024-09-25 RX ORDER — ALUMINA, MAGNESIA, AND SIMETHICONE 2400; 2400; 240 MG/30ML; MG/30ML; MG/30ML
15 SUSPENSION ORAL EVERY 6 HOURS PRN
Status: CANCELLED | OUTPATIENT
Start: 2024-09-25

## 2024-09-25 RX ORDER — SODIUM CHLORIDE 0.9 % (FLUSH) 0.9 %
10 SYRINGE (ML) INJECTION AS NEEDED
Status: DISCONTINUED | OUTPATIENT
Start: 2024-09-25 | End: 2024-09-25 | Stop reason: HOSPADM

## 2024-09-25 RX ORDER — DEXTROMETHORPHAN HYDROBROMIDE, BUPROPION HYDROCHLORIDE 105; 45 MG/1; MG/1
2 TABLET, MULTILAYER, EXTENDED RELEASE ORAL EVERY MORNING
COMMUNITY

## 2024-09-25 RX ORDER — MORPHINE SULFATE 2 MG/ML
2 INJECTION, SOLUTION INTRAMUSCULAR; INTRAVENOUS EVERY 4 HOURS PRN
Status: CANCELLED | OUTPATIENT
Start: 2024-09-25 | End: 2024-09-30

## 2024-09-25 RX ORDER — POLYETHYLENE GLYCOL 3350 17 G/17G
17 POWDER, FOR SOLUTION ORAL DAILY PRN
Status: CANCELLED | OUTPATIENT
Start: 2024-09-25

## 2024-09-25 RX ORDER — ASPIRIN 325 MG
325 TABLET, DELAYED RELEASE (ENTERIC COATED) ORAL DAILY
COMMUNITY

## 2024-09-25 RX ORDER — CLONAZEPAM 0.5 MG/1
0.5 TABLET ORAL 3 TIMES DAILY
Status: CANCELLED | OUTPATIENT
Start: 2024-09-25 | End: 2024-09-30

## 2024-09-25 RX ORDER — GLIPIZIDE 10 MG/1
10 TABLET ORAL
Status: CANCELLED | OUTPATIENT
Start: 2024-09-25

## 2024-09-25 RX ORDER — NITROGLYCERIN 0.4 MG/1
0.4 TABLET SUBLINGUAL ONCE
Status: DISCONTINUED | OUTPATIENT
Start: 2024-09-25 | End: 2024-09-25 | Stop reason: HOSPADM

## 2024-09-25 RX ORDER — AMOXICILLIN 250 MG
2 CAPSULE ORAL 2 TIMES DAILY PRN
Status: CANCELLED | OUTPATIENT
Start: 2024-09-25

## 2024-09-25 RX ORDER — BENZONATATE 100 MG/1
200 CAPSULE ORAL 3 TIMES DAILY PRN
Status: CANCELLED | OUTPATIENT
Start: 2024-09-25

## 2024-09-25 RX ORDER — ONDANSETRON 2 MG/ML
4 INJECTION INTRAMUSCULAR; INTRAVENOUS EVERY 6 HOURS PRN
Status: CANCELLED | OUTPATIENT
Start: 2024-09-25

## 2024-09-25 RX ORDER — BISACODYL 10 MG
10 SUPPOSITORY, RECTAL RECTAL DAILY PRN
Status: CANCELLED | OUTPATIENT
Start: 2024-09-25

## 2024-09-25 RX ORDER — ENOXAPARIN SODIUM 100 MG/ML
40 INJECTION SUBCUTANEOUS DAILY
Status: CANCELLED | OUTPATIENT
Start: 2024-09-25

## 2024-09-25 RX ORDER — OXYCODONE AND ACETAMINOPHEN 7.5; 325 MG/1; MG/1
1 TABLET ORAL EVERY 4 HOURS PRN
Status: CANCELLED | OUTPATIENT
Start: 2024-09-25

## 2024-09-25 RX ORDER — FUROSEMIDE 10 MG/ML
80 INJECTION INTRAMUSCULAR; INTRAVENOUS ONCE
Status: COMPLETED | OUTPATIENT
Start: 2024-09-25 | End: 2024-09-25

## 2024-09-25 RX ORDER — AMLODIPINE BESYLATE 5 MG/1
10 TABLET ORAL DAILY
Status: CANCELLED | OUTPATIENT
Start: 2024-09-25

## 2024-09-25 RX ORDER — ALPRAZOLAM 0.25 MG/1
0.5 TABLET ORAL EVERY 8 HOURS PRN
Status: CANCELLED | OUTPATIENT
Start: 2024-09-25 | End: 2024-10-02

## 2024-09-25 RX ORDER — INSULIN DEGLUDEC 100 U/ML
20 INJECTION, SOLUTION SUBCUTANEOUS EVERY MORNING
COMMUNITY

## 2024-09-25 RX ORDER — DULOXETIN HYDROCHLORIDE 30 MG/1
60 CAPSULE, DELAYED RELEASE ORAL DAILY
Status: CANCELLED | OUTPATIENT
Start: 2024-09-25

## 2024-09-25 RX ORDER — ACETAMINOPHEN 160 MG/5ML
650 SOLUTION ORAL EVERY 4 HOURS PRN
Status: CANCELLED | OUTPATIENT
Start: 2024-09-25

## 2024-09-25 RX ADMIN — FUROSEMIDE 80 MG: 10 INJECTION, SOLUTION INTRAMUSCULAR; INTRAVENOUS at 13:53

## 2024-09-25 NOTE — CASE MANAGEMENT/SOCIAL WORK
Discharge Planning Assessment   [place]     Patient Name: Melania Centeno  MRN: [id number]  Today's Date: 9/25/2024    Admit Date: 9/25/2024        Discharge Needs Assessment       Row Name 09/25/24 1439       Living Environment    People in Home child(jocelin), adult;spouse    Name(s) of People in Home lives with  Tyrese and daughter Lillian    Current Living Arrangements home    Potentially Unsafe Housing Conditions none    In the past 12 months has the electric, gas, oil, or water company threatened to shut off services in your home? No    Primary Care Provided by self    Provides Primary Care For no one    Family Caregiver if Needed child(jocelin), adult;spouse    Family Caregiver Names  Tyrese and daughter Lillian    Quality of Family Relationships helpful;involved;supportive    Able to Return to Prior Arrangements yes       Resource/Environmental Concerns    Resource/Environmental Concerns none    Transportation Concerns other (see comments)       Transportation Needs    In the past 12 months, has lack of transportation kept you from medical appointments or from getting medications? no    In the past 12 months, has lack of transportation kept you from meetings, work, or from getting things needed for daily living? No       Food Insecurity    Within the past 12 months, you worried that your food would run out before you got the money to buy more. Never true    Within the past 12 months, the food you bought just didn't last and you didn't have money to get more. Never true       Transition Planning    Patient/Family Anticipates Transition to home with family    Patient/Family Anticipated Services at Transition none    Transportation Anticipated family or friend will provide       Discharge Needs Assessment    Readmission Within the Last 30 Days no previous admission in last 30 days    Current Outpatient/Agency/Support Group clinic(s)    Equipment Currently Used at Home commode;other (see comments);walker,  standard;cane, straight    Concerns to be Addressed discharge planning    Anticipated Changes Related to Illness none    Equipment Needed After Discharge none    Current Discharge Risk physical impairment                   Discharge Plan    No documentation.                 Continued Care and Services - Admitted Since 9/25/2024    No active coordination exists for this encounter.          Demographic Summary       Row Name 09/25/24 1437       General Information    Admission Type inpatient    Arrived From home    Referral Source emergency department    Reason for Consult discharge planning    Preferred Language English       Contact Information    Permission Granted to Share Info With ;, insurance;family/designee    Contact Information Obtained for ;, insurance                   Functional Status       Row Name 09/25/24 1438       Functional Status    Usual Activity Tolerance good    Current Activity Tolerance fair       Physical Activity    On average, how many days per week do you engage in moderate to strenuous exercise (like a brisk walk)? 0 days    On average, how many minutes do you engage in exercise at this level? 0 min    Number of minutes of exercise per week 0       Assessment of Health Literacy    How often do you have someone help you read hospital materials? Never    How often do you have problems learning about your medical condition because of difficulty understanding written information? Never    How often do you have a problem understanding what is told to you about your medical condition? Never    How confident are you filling out medical forms by yourself? Extremely    Health Literacy Excellent       Functional Status, IADL    Medications independent    Meal Preparation independent    Housekeeping independent    Laundry independent    Shopping assistive equipment and person       Mental Status    General Appearance WDL WDL       Mental Status  Summary    Recent Changes in Mental Status/Cognitive Functioning no changes       Employment/    Employment Status retired    Current or Previous Occupation other (see comments)    Employment/ Comments Caregiver                   Psychosocial    No documentation.                  Abuse/Neglect       Row Name 09/25/24 1439       Personal Safety    Feels Unsafe at Home or Work/School no    Feels Threatened by Someone no    Does Anyone Try to Keep You From Having Contact with Others or Doing Things Outside Your Home? no    Physical Signs of Abuse Present no                   Legal       Row Name 09/25/24 1439       Financial Resource Strain    How hard is it for you to pay for the very basics like food, housing, medical care, and heating? Not very       Financial/Legal    Source of Income pension/shelter    Application for Public Assistance not applied       Legal    Criminal Activity/Legal Involvement none                   Substance Abuse       Row Name 09/25/24 1439       Substance Use    Substance Use Status never used       Family Member Substance Use (#4)    Family History of Substance Use none    Previous Substance Use Treatment none                   Patient Forms    No documentation.                 SW met with patient and , Tyrese at bedside. Pt also lives with her  and daughter, Lillian. Pt is normally independent with her ADL's. However, patient had knee replacement surgery 2 months ago and  drives patient to errands and appointments. Pt reports that sometimes it is difficult to get in and out of husbands vehicle. Pt reports that her knee surgeon is Dr. Yañez. Pt has a scooter, cane, walker, bedside commode at home that she utilizes. Pt sees Dr. Pascal for her PCP and uses [facility] in [place] for her pharmacy needs. No current needs have been reported at this time.    MOSHE Sykes

## 2024-09-25 NOTE — PLAN OF CARE
[facility]   HISTORY AND PHYSICAL    Patient Name: Melania Centeno  : 1950  MRN: [id number]  Primary Care Physician:  Javon Pascal MD  Date of admission: 2024    Subjective   Subjective     I was called by the ER for admission of the patient and orders were placed  However on reviewing the chart it appears the patient was discharged.

## 2024-09-25 NOTE — ED PROVIDER NOTES
Time: 2:18 PM EDT  Date of encounter:  9/25/2024  Independent Historian/Clinical History and Information was obtained by:   Patient and EMS    History is limited by: N/A    Chief Complaint: Shortness of breath      History of Present Illness:  Patient is a 74 y.o. year old female who presents to the emergency department for evaluation of shortness of breath.  Patient arrives by EMS who reports upon their arrival patient was 82% on room air and is normally not oxygen requiring.  They placed her on CPAP and transported her to the emergency department.  On arrival agreeable to titrate patient down to couple liters nasal cannula with her being able to maintain her oxygen saturations.      Patient Care Team  Primary Care Provider: Javon Pascal MD    Past Medical History:     No Known Allergies  Past Medical History:   Diagnosis Date    Anesthesia     ptsd/pt has nightmares, but states no issues while waking  up  from  anesthesia    Arthritis     Cervical spinal stenosis 04/03/2019    Brisk reflexes suggestive of myelopathy/h/o cervical fusion    Cervicalgia     Depression     Diverticulitis of colon     Fatty liver     Foot pain     Fusion of spine of cervical region 06/19/2019    GERD (gastroesophageal reflux disease)     Hyperlipidemia     recent cardiology visit for surg clearance. no issues noted, no cp or soa    Hypertension     Leg pain     Psoriasis     PTSD (post-traumatic stress disorder)     Type 2 diabetes mellitus      Past Surgical History:   Procedure Laterality Date    BREAST BIOPSY      CERVICAL FUSION  03/14/2019    right C4-5,C5-6    CHOLECYSTECTOMY      COLONOSCOPY      COLONOSCOPY N/A 8/3/2023    Procedure: COLONOSCOPY WITH POLYPECTOMIES;  Surgeon: Neal Delaney MD;  Location: [facility] ENDOSCOPY;  Service: Gastroenterology;  Laterality: N/A;  COLON POLYPS, DIVERTICULOSIS    ENDOSCOPY N/A 8/3/2023    Procedure: ESOPHAGOGASTRODUODENOSCOPY WITH BIOPSIES;  Surgeon: Neal Delaney MD;   Location: [facility] ENDOSCOPY;  Service: Gastroenterology;  Laterality: N/A;  HIATAL HERNIA, REFLUX ESOPHAGITIS    HEMORRHOIDECTOMY      KNEE SURGERY      TOTAL KNEE ARTHROPLASTY Left 7/29/2024    Procedure: LEFT TOTAL KNEE ARTHROPLASTY;  Surgeon: Rajeev Yañez MD;  Location: [facility] MAIN OR;  Service: Orthopedics;  Laterality: Left;    TUBAL ABDOMINAL LIGATION       Family History   Problem Relation Age of Onset    Diabetes Mother         Unspecified    Breast cancer Sister     Brain cancer Sister     Colon cancer Sister 62    Heart disease Sister     Heart disease Brother     Diabetes Brother         Unspecified       Home Medications:  Prior to Admission medications    Medication Sig Start Date End Date Taking? Authorizing Provider   amLODIPine (NORVASC) 10 MG tablet Take 1 tablet by mouth Daily.    Gia Smith MD   apixaban (ELIQUIS) 2.5 MG tablet tablet Take 1 tablet by mouth Every 12 (Twelve) Hours. Once Eliquis is completed, begin enteric-coated aspirin 325 mg by mouth daily for 4 weeks 7/30/24   aRjeev Yañez MD   atorvastatin (LIPITOR) 10 MG tablet Take 1 tablet by mouth Daily. 8/17/23   Kelsey Hoyos APRN   benazepril (LOTENSIN) 40 MG tablet TAKE 1 TABLET EVERY DAY 1/22/24   Kelsey Hoyos APRN   benzonatate (TESSALON) 200 MG capsule Take 1 capsule by mouth 3 (Three) Times a Day As Needed for Cough.    Gia Smith MD   clonazePAM (KlonoPIN) 0.5 MG tablet Take 1 tablet by mouth 3 times a day. 5/7/24   Gia Smith MD   DULoxetine (CYMBALTA) 60 MG capsule Take 1 capsule by mouth Daily. 12/28/22   Gia Smith MD   gabapentin (NEURONTIN) 100 MG capsule Take 1 capsule by mouth 3 (Three) Times a Day As Needed. 5/12/24   Gia Smith MD   glipizide (GLUCOTROL) 10 MG tablet Take 1 tablet by mouth 2 (Two) Times a Day Before Meals. 1/5/23   Kelsey Hoyos APRN   glucose blood (FREESTYLE LITE) test strip Use with glucose meter to check blood sugars  twice a day (DX. E11.65)  Indications: Diabetes 7/11/23   Kelsey Hoyos APRN   HumuLIN N KwikPen 100 UNIT/ML injection 5-15 Units 3 (Three) Times a Day As Needed. 6/17/24   Gia Smith MD   HYDROcodone-acetaminophen (NORCO)  MG per tablet  8/12/24   Gia Smith MD   Insulin Pen Needle (Droplet Pen Needles) 32G X 5 MM misc Inject 1 each under the skin into the appropriate area as directed Daily. 1/5/23   Kelsey Hoyos APRN   metoprolol succinate XL (TOPROL-XL) 25 MG 24 hr tablet Take 1 tablet by mouth Daily. 7/24/23   Roly Torres MD   omeprazole (priLOSEC) 40 MG capsule Take 1 capsule by mouth Daily. APPOINTMENT NEEDED FOR ADDITIONAL REFILLS  Patient taking differently: Take 1 capsule by mouth Daily. 4/4/24   Kelsey Hoyos APRN   oxyCODONE-acetaminophen (PERCOCET) 7.5-325 MG per tablet Take 1-2 tablets by mouth Every 4 (Four) Hours As Needed for Severe Pain or Moderate Pain. 7/30/24   Rajeev Yañez MD   tiZANidine (ZANAFLEX) 4 MG tablet Take 1 tablet by mouth Every 8 (Eight) Hours. 12/28/22   Gia Smith MD        Social History:   Social History     Tobacco Use    Smoking status: Never    Smokeless tobacco: Never   Vaping Use    Vaping status: Never Used   Substance Use Topics    Alcohol use: Never    Drug use: No         Review of Systems:  Review of Systems   Constitutional:  Negative for chills and fever.   HENT:  Negative for congestion, rhinorrhea and sore throat.    Eyes:  Negative for pain and visual disturbance.   Respiratory:  Positive for shortness of breath. Negative for apnea, cough and chest tightness.    Cardiovascular:  Positive for leg swelling. Negative for chest pain and palpitations.   Gastrointestinal:  Negative for abdominal pain, diarrhea, nausea and vomiting.   Genitourinary:  Negative for difficulty urinating and dysuria.   Musculoskeletal:  Negative for joint swelling and myalgias.   Skin:  Negative for color change.  "  Neurological:  Negative for seizures and headaches.   Psychiatric/Behavioral: Negative.     All other systems reviewed and are negative.       Physical Exam:  /67   Pulse 89   Temp 98 °F (36.7 °C) (Oral)   Resp 20   Ht 154.9 cm (61\")   Wt 90.7 kg (200 lb)   SpO2 92%   BMI 37.79 kg/m²     Physical Exam  Vitals and nursing note reviewed.   Constitutional:       General: She is not in acute distress.     Appearance: Normal appearance. She is not toxic-appearing.   HENT:      Head: Normocephalic and atraumatic.      Jaw: There is normal jaw occlusion.   Eyes:      General: Lids are normal.      Extraocular Movements: Extraocular movements intact.      Conjunctiva/sclera: Conjunctivae normal.      Pupils: Pupils are equal, round, and reactive to light.   Cardiovascular:      Rate and Rhythm: Normal rate and regular rhythm.      Pulses: Normal pulses.      Heart sounds: Normal heart sounds.   Pulmonary:      Effort: Tachypnea and respiratory distress present.      Breath sounds: Wheezing present. No rhonchi.   Abdominal:      General: Abdomen is flat.      Palpations: Abdomen is soft.      Tenderness: There is no abdominal tenderness. There is no guarding or rebound.   Musculoskeletal:         General: Normal range of motion.      Cervical back: Normal range of motion and neck supple.      Right lower leg: Edema present.      Left lower leg: Edema present.   Skin:     General: Skin is warm and dry.   Neurological:      Mental Status: She is alert and oriented to person, place, and time. Mental status is at baseline.   Psychiatric:         Mood and Affect: Mood normal.                  Procedures:  Procedures      Medical Decision Making:      Comorbidities that affect care:    Hypertension, diabetes    External Notes reviewed:    None      The following orders were placed and all results were independently analyzed by me:  Orders Placed This Encounter   Procedures    XR Chest 1 View    Isle Draw    " Comprehensive Metabolic Panel    BNP    Single High Sensitivity Troponin T    CBC Auto Differential    Blood Gas, Arterial -    NPO Diet NPO Type: Strict NPO    Undress & Gown    Continuous Pulse Oximetry    Vital Signs    Code Status and Medical Interventions: CPR (Attempt to Resuscitate); Full Support    IP General Consult (Use specialty-specific consult if known)    Oxygen Therapy- Nasal Cannula; Titrate 1-6 LPM Per SpO2; 90 - 95%    POC Glucose Finger 4x Daily Before Meals & at Bedtime    ECG 12 Lead ED Triage Standing Order; SOA    Insert Peripheral IV    Inpatient Admission    CBC & Differential    Green Top (Gel)    Lavender Top    Gold Top - SST    Light Blue Top       Medications Given in the Emergency Department:  Medications   sodium chloride 0.9 % flush 10 mL (has no administration in time range)   nitroglycerin (NITROSTAT) SL tablet 0.4 mg (0.4 mg Sublingual Not Given 9/25/24 1354)   furosemide (LASIX) injection 80 mg (80 mg Intravenous Given 9/25/24 1353)        ED Course:         Labs:    Lab Results (last 24 hours)       Procedure Component Value Units Date/Time    CBC & Differential [[id number]]  (Normal) Collected: 09/25/24 1232    Specimen: Blood Updated: 09/25/24 1247    Narrative:      The following orders were created for panel order CBC & Differential.  Procedure                               Abnormality         Status                     ---------                               -----------         ------                     CBC Auto Differential[[id number]]        Normal              Final result                 Please view results for these tests on the individual orders.    Comprehensive Metabolic Panel [[id number]]  (Abnormal) Collected: 09/25/24 1232    Specimen: Blood Updated: 09/25/24 1315     Glucose 208 mg/dL      BUN 16 mg/dL      Creatinine 0.69 mg/dL      Sodium 137 mmol/L      Potassium 4.2 mmol/L      Comment: Slight hemolysis detected by analyzer. Result may be falsely elevated.         Chloride 102 mmol/L      CO2 22.9 mmol/L      Calcium 8.8 mg/dL      Total Protein 7.9 g/dL      Albumin 3.7 g/dL      ALT (SGPT) 11 U/L      AST (SGOT) 26 U/L      Comment: Slight hemolysis detected by analyzer. Result may be falsely elevated.        Alkaline Phosphatase 126 U/L      Total Bilirubin 1.0 mg/dL      Globulin 4.2 gm/dL      A/G Ratio 0.9 g/dL      BUN/Creatinine Ratio 23.2     Anion Gap 12.1 mmol/L      eGFR 91.8 mL/min/1.73     Narrative:      GFR Normal >60  Chronic Kidney Disease <60  Kidney Failure <15    The GFR formula is only valid for adults with stable renal function between ages 18 and 70.    BNP [[id number]]  (Normal) Collected: 09/25/24 1232    Specimen: Blood Updated: 09/25/24 1309     proBNP 780.0 pg/mL     Narrative:      This assay is used as an aid in the diagnosis of individuals suspected of having heart failure. It can be used as an aid in the diagnosis of acute decompensated heart failure (ADHF) in patients presenting with signs and symptoms of ADHF to the emergency department (ED). In addition, NT-proBNP of <300 pg/mL indicates ADHF is not likely.    Age Range Result Interpretation  NT-proBNP Concentration (pg/mL:      <50             Positive            >450                   Gray                 300-450                    Negative             <300    50-75           Positive            >900                  Gray                300-900                  Negative            <300      >75             Positive            >1800                  Gray                300-1800                  Negative            <300    Single High Sensitivity Troponin T [[id number]]  (Normal) Collected: 09/25/24 1232    Specimen: Blood Updated: 09/25/24 1313     HS Troponin T 11 ng/L     Narrative:      High Sensitive Troponin T Reference Range:  <14.0 ng/L- Negative Female for AMI  <22.0 ng/L- Negative Male for AMI  >=14 - Abnormal Female indicating possible myocardial injury.  >=22 - Abnormal Male  indicating possible myocardial injury.   Clinicians would have to utilize clinical acumen, EKG, Troponin, and serial changes to determine if it is an Acute Myocardial Infarction or myocardial injury due to an underlying chronic condition.         CBC Auto Differential [[id number]]  (Normal) Collected: 09/25/24 1232    Specimen: Blood Updated: 09/25/24 1247     WBC 9.34 10*3/mm3      RBC 4.32 10*6/mm3      Hemoglobin 12.4 g/dL      Hematocrit 39.2 %      MCV 90.7 fL      MCH 28.7 pg      MCHC 31.6 g/dL      RDW 15.3 %      RDW-SD 50.7 fl      MPV 11.9 fL      Platelets 165 10*3/mm3      Neutrophil % 62.6 %      Lymphocyte % 25.9 %      Monocyte % 8.7 %      Eosinophil % 2.0 %      Basophil % 0.5 %      Immature Grans % 0.3 %      Neutrophils, Absolute 5.84 10*3/mm3      Lymphocytes, Absolute 2.42 10*3/mm3      Monocytes, Absolute 0.81 10*3/mm3      Eosinophils, Absolute 0.19 10*3/mm3      Basophils, Absolute 0.05 10*3/mm3      Immature Grans, Absolute 0.03 10*3/mm3      nRBC 0.0 /100 WBC     Blood Gas, Arterial - [[id number]]  (Abnormal) Collected: 09/25/24 1245    Specimen: Arterial Blood Updated: 09/25/24 1248     Site Right Brachial     Edy's Test N/A     pH, Arterial 7.413 pH units      pCO2, Arterial 37.5 mm Hg      pO2, Arterial 57.4 mm Hg      HCO3, Arterial 23.9 mmol/L      Base Excess, Arterial -0.5 mmol/L      Comment: Serial Number: [id number]:  [id number]        O2 Saturation, Arterial 89.9 %      Hemoglobin, Blood Gas 13.2 g/dL      Hematocrit, Blood Gas 39.0 %      Barometric Pressure for Blood Gas 741.8000 mmHg      Modality Cannula     FIO2 28 %      Flow Rate 2.0000 lpm      Hemodilution No     PO2/FIO2 205             Imaging:    XR Chest 1 View    Result Date: 9/25/2024  XR CHEST 1 VW Date of Exam: 9/25/2024 12:41 PM EDT Indication: SOA Triage Protocol Comparison: December 16, 2023 Findings: The heart looks enlarged. The left lung looks relatively clear. There are prominent markings in the right  perihilar right lower lobe area that could be secondary to pneumonia. There may be a right pleural effusion. There is some prominence of pulmonary vascular markings. Some vascular congestion not excluded. Some of the appearance to the right lung might reflect edema.     Impression: 1.Cardiomegaly. It looks like there may be some vascular congestion. 2.Airspace disease right lung that could be secondary to pneumonia. Some underlying edema not excluded 3.Probable right pleural effusion. Electronically Signed: Otilio Tatum MD  9/25/2024 1:06 PM EDT  Workstation ID: [id number]       Differential Diagnosis and Discussion:    Dyspnea: Differential diagnosis includes but is not limited to metabolic acidosis, neurological disorders, psychogenic, asthma, pneumothorax, upper airway obstruction, COPD, pneumonia, noncardiogenic pulmonary edema, interstitial lung disease, anemia, congestive heart failure, and pulmonary embolism    All labs were reviewed and interpreted by me.  All X-rays impressions were independently interpreted by me.  EKG was interpreted by me.    MDM  Number of Diagnoses or Management Options  Acute pulmonary edema  Peripheral edema  Shortness of breath  Diagnosis management comments: Insert my this is a 74-year-old female who presents to the emergency department for evaluation of shortness of breath.  She arrived on CPAP but we were able to titrate her down to nasal cannula oxygen.  CBC independently reviewed and interpreted by me and shows no critical abnormalities.  CMP independently reviewed and interpreted by me and shows no critical abnormalities.  Chest x-ray reviewed by me reveals changes consistent with probable pulmonary edema.  IV diuresis initiated in the emergency department.  Patient case has been discussed with the PCP team who will admit to the hospital for further evaluation and continuation of treatment.             The patient presents with respiratory distress.  The patient does require  supplemental oxygen.  Patient was placed on the cardiac monitor after given IV Lasix and supplemental oxygen.  They were monitored for ventricular ectopy, arrhythmia, tachycardia, hypoxia, and changes in blood pressure.  Continuous pulse oximetry was placed in waveform with corresponding oxygen saturation was assessed throughout their stay in the emergency department.  Patient was rechecked several times in the ED for decline in mental status and worsening distress.    Total Critical Care time of 40 minutes. Total critical care time documented does not include time spent on separately billed procedures for services of nurses or physician assistants. I personally saw and examined the patient. I have reviewed all diagnostic interpretations and treatment plans as written. I was present for the key portions of any procedures performed and the inclusive time noted in any critical care statement. Critical care time includes patient management by me, time spent at the patients bedside,  time to review lab and imaging results, discussing patient care, documentation in the medical record, and time spent with family or caregiver.          Patient Care Considerations:    SEPSIS IS NOT PRESENT IN THE EMERGENCY DEPARTMENT: Patient meets SIRS criteria in the emergency department however the patient does not have a known source of bacterial infection to confirm the diagnosis of sepsis.        Consultants/Shared Management Plan:    PCP: I have discussed the case with Dr. Segovia who agrees to accept the patient for admission.    Social Determinants of Health:    Patient is independent, reliable, and has access to care.       Disposition and Care Coordination:    Admit:   Through independent evaluation of the patient's history, physical, and imperical data, the patient meets criteria for inpatient admission to the hospital.        Final diagnoses:   Shortness of breath   Acute pulmonary edema   Peripheral edema        ED  Disposition       ED Disposition   AMA    Condition   --    Comment   Level of Care: Med/Surg [1]  Diagnosis: Shortness of breath [786.05.ICD-9-CM]  Admitting Physician: ENEDELIA PRO [[id number]]  Attending Physician: ENEDELIA PRO [[id number]]  Certification: I Certify That Inpatient Hospital Services Are Medical ly Necessary For Greater Than 2 Midnights                 This medical record created using voice recognition software.             Jj Márquez MD  09/25/24 182

## 2024-09-25 NOTE — Clinical Note
Level of Care: Med/Surg [1]   Diagnosis: Shortness of breath [786.05.ICD-9-CM]   Admitting Physician: ENEDELIA PRO [[id number]]   Attending Physician: ENEDELIA PRO [[id number]]   Certification: I Certify That Inpatient Hospital Services Are Medically Necessary For Greater Than 2 Midnights

## 2024-09-30 LAB
QT INTERVAL: 398 MS
QTC INTERVAL: 463 MS

## 2024-10-04 ENCOUNTER — OFFICE VISIT (OUTPATIENT)
Dept: ORTHOPEDIC SURGERY | Facility: CLINIC | Age: 74
End: 2024-10-04
Payer: MEDICARE

## 2024-10-04 VITALS
WEIGHT: 199.96 LBS | OXYGEN SATURATION: 94 % | HEIGHT: 61 IN | BODY MASS INDEX: 37.75 KG/M2 | HEART RATE: 66 BPM | SYSTOLIC BLOOD PRESSURE: 149 MMHG | DIASTOLIC BLOOD PRESSURE: 74 MMHG

## 2024-10-04 DIAGNOSIS — M25.562 LEFT KNEE PAIN, UNSPECIFIED CHRONICITY: ICD-10-CM

## 2024-10-04 DIAGNOSIS — Z96.652 AFTERCARE FOLLOWING LEFT KNEE JOINT REPLACEMENT SURGERY: Primary | ICD-10-CM

## 2024-10-04 DIAGNOSIS — Z47.1 AFTERCARE FOLLOWING LEFT KNEE JOINT REPLACEMENT SURGERY: Primary | ICD-10-CM

## 2024-10-04 DIAGNOSIS — Z96.652 S/P TOTAL KNEE ARTHROPLASTY, LEFT: ICD-10-CM

## 2024-10-04 PROCEDURE — 3078F DIAST BP <80 MM HG: CPT

## 2024-10-04 PROCEDURE — 99024 POSTOP FOLLOW-UP VISIT: CPT

## 2024-10-04 PROCEDURE — 3077F SYST BP >= 140 MM HG: CPT

## 2024-10-09 NOTE — PROGRESS NOTES
"Chief Complaint  Pain and Follow-up of the Left Knee    Subjective      Melania Centeno presents to [facility] for follow up of left knee.  Patient is 8 weeks status post left total knee arthroplasty performed Dr. Yañez on 7/29/2024..  Patient has not been attending outpatient physical therapy and states that she is doing well.  She states that she has done with home health physical therapy and doing her home exercises.  She is ambulating today with a cane but states that she only uses that intermittently outside of the house.  She states that pain is doing okay and she is getting her pain medication through her PCP.    No Known Allergies    Objective     Vital Signs:   Vitals:    10/04/24 1050   BP: 149/74   Pulse: 66   SpO2: 94%   Weight: 90.7 kg (199 lb 15.3 oz)   Height: 154.9 cm (61\")     Body mass index is 37.78 kg/m².    I reviewed the patient's chief complaint, history of present illness, review of systems, past medical history, surgical history, family history, social history, medications, and allergy list.     REVIEW OF SYSTEMS    Constitutional: Denies fevers, chills, weight loss  Cardiovascular: Denies chest pain, shortness of breath  Skin: Denies rashes, acute skin changes  Neurologic: Denies headache, loss of consciousness  MSK: Left knee pain.     Ortho Exam  Knee   General: Alert, no acute distress.   Left knee: No pain with passive hip ROM.  Well-healing incision.  Knee stable to varus/valgus stress.  Knee extensor mechanism intact.  0 degrees knee extension. Flexion to 120 degrees. Calf soft, non-tender.  Sensation and neurovascularly intact.  Demonstrates active ankle dorsiflexion and plantarflexion.  Palpable pedal pulses.               Imaging Results (Most Recent)       None                Assessment and Plan   Diagnoses and all orders for this visit:    1. Aftercare following left knee joint replacement surgery (Primary)    2. S/P total knee arthroplasty, " left    3. Left knee pain, unspecified chronicity         Melania Centeno presents today 7 weeks post op left total knee arthroplasty performed by Dr. Yañez on 1/29/2024.  Incision is well healing without active drainage or redness noted. No concerning signs of infection. We discussed concerning signs and symptoms regarding the incision sites.  Patient understood and agreed. Patient instructed to continue PT as well as home exercises.     Patient will follow-up in 6 weeks for reevaluation.  We will obtain new x-rays of the left knee at next visit.    Call or return if symptoms worsen or patient has any concerns.        Tobacco Use: Low Risk  (10/4/2024)    Patient History     Smoking Tobacco Use: Never     Smokeless Tobacco Use: Never     Passive Exposure: Not on file     Patient reports that they are a nonsmoker; cessation education not applicable.            Follow Up   No follow-ups on file.  There are no Patient Instructions on file for this visit.  Patient was given instructions and counseling regarding her condition or for health maintenance advice. Please see specific information pulled into the AVS if appropriate.       Dictated Utilizing Dragon Dictation. Please note that portions of this note were completed with a voice recognition program. Part of this note may be an electronic transcription/translation of spoken language to printed text using the Dragon Dictation System.

## 2025-03-17 ENCOUNTER — HOSPITAL ENCOUNTER (OUTPATIENT)
Facility: HOSPITAL | Age: 75
Setting detail: OBSERVATION
Discharge: HOME | End: 2025-03-19
Attending: EMERGENCY MEDICINE | Admitting: INTERNAL MEDICINE
Payer: MEDICARE

## 2025-03-17 ENCOUNTER — APPOINTMENT (OUTPATIENT)
Dept: GENERAL RADIOLOGY | Facility: HOSPITAL | Age: 75
End: 2025-03-17
Payer: MEDICARE

## 2025-03-17 DIAGNOSIS — J10.1 INFLUENZA A: Primary | ICD-10-CM

## 2025-03-17 DIAGNOSIS — R26.2 DIFFICULTY IN WALKING: ICD-10-CM

## 2025-03-17 DIAGNOSIS — K21.9 GASTROESOPHAGEAL REFLUX DISEASE, UNSPECIFIED WHETHER ESOPHAGITIS PRESENT: ICD-10-CM

## 2025-03-17 LAB
ALBUMIN SERPL-MCNC: 3.7 G/DL (ref 3.5–5.2)
ALBUMIN/GLOB SERPL: 0.9 G/DL
ALP SERPL-CCNC: 93 U/L (ref 39–117)
ALT SERPL W P-5'-P-CCNC: 18 U/L (ref 1–33)
ANION GAP SERPL CALCULATED.3IONS-SCNC: 10.7 MMOL/L (ref 5–15)
AST SERPL-CCNC: 45 U/L (ref 1–32)
BACTERIA UR QL AUTO: ABNORMAL /HPF
BASOPHILS # BLD AUTO: 0.03 10*3/MM3 (ref 0–0.2)
BASOPHILS NFR BLD AUTO: 0.5 % (ref 0–1.5)
BILIRUB SERPL-MCNC: 1.5 MG/DL (ref 0–1.2)
BILIRUB UR QL STRIP: ABNORMAL
BUN SERPL-MCNC: 12 MG/DL (ref 8–23)
BUN/CREAT SERPL: 16.7 (ref 7–25)
CALCIUM SPEC-SCNC: 9 MG/DL (ref 8.6–10.5)
CHLORIDE SERPL-SCNC: 98 MMOL/L (ref 98–107)
CLARITY UR: CLEAR
CO2 SERPL-SCNC: 24.3 MMOL/L (ref 22–29)
COLOR UR: ABNORMAL
CREAT SERPL-MCNC: 0.72 MG/DL (ref 0.57–1)
DEPRECATED RDW RBC AUTO: 47.5 FL (ref 37–54)
EGFRCR SERPLBLD CKD-EPI 2021: 88.4 ML/MIN/1.73
EOSINOPHIL # BLD AUTO: 0 10*3/MM3 (ref 0–0.4)
EOSINOPHIL NFR BLD AUTO: 0 % (ref 0.3–6.2)
ERYTHROCYTE [DISTWIDTH] IN BLOOD BY AUTOMATED COUNT: 14.9 % (ref 12.3–15.4)
FLUAV RNA RESP QL NAA+PROBE: DETECTED
FLUBV RNA RESP QL NAA+PROBE: NOT DETECTED
GEN 5 1HR TROPONIN T REFLEX: 18 NG/L
GLOBULIN UR ELPH-MCNC: 4 GM/DL
GLUCOSE BLDC GLUCOMTR-MCNC: 162 MG/DL (ref 70–99)
GLUCOSE SERPL-MCNC: 152 MG/DL (ref 65–99)
GLUCOSE UR STRIP-MCNC: NEGATIVE MG/DL
HCT VFR BLD AUTO: 40.3 % (ref 34–46.6)
HGB BLD-MCNC: 12.7 G/DL (ref 12–15.9)
HGB UR QL STRIP.AUTO: ABNORMAL
HOLD SPECIMEN: NORMAL
HYALINE CASTS UR QL AUTO: ABNORMAL /LPF
IMM GRANULOCYTES # BLD AUTO: 0.03 10*3/MM3 (ref 0–0.05)
IMM GRANULOCYTES NFR BLD AUTO: 0.5 % (ref 0–0.5)
KETONES UR QL STRIP: ABNORMAL
LEUKOCYTE ESTERASE UR QL STRIP.AUTO: ABNORMAL
LYMPHOCYTES # BLD AUTO: 1.06 10*3/MM3 (ref 0.7–3.1)
LYMPHOCYTES NFR BLD AUTO: 17.7 % (ref 19.6–45.3)
MCH RBC QN AUTO: 27.4 PG (ref 26.6–33)
MCHC RBC AUTO-ENTMCNC: 31.5 G/DL (ref 31.5–35.7)
MCV RBC AUTO: 87 FL (ref 79–97)
MONOCYTES # BLD AUTO: 0.66 10*3/MM3 (ref 0.1–0.9)
MONOCYTES NFR BLD AUTO: 11 % (ref 5–12)
NEUTROPHILS NFR BLD AUTO: 4.2 10*3/MM3 (ref 1.7–7)
NEUTROPHILS NFR BLD AUTO: 70.3 % (ref 42.7–76)
NITRITE UR QL STRIP: NEGATIVE
NRBC BLD AUTO-RTO: 0 /100 WBC (ref 0–0.2)
NT-PROBNP SERPL-MCNC: 6959 PG/ML (ref 0–900)
PH UR STRIP.AUTO: 6.5 [PH] (ref 5–8)
PLATELET # BLD AUTO: 120 10*3/MM3 (ref 140–450)
PMV BLD AUTO: 11.3 FL (ref 6–12)
POTASSIUM SERPL-SCNC: 3.9 MMOL/L (ref 3.5–5.2)
PROT SERPL-MCNC: 7.7 G/DL (ref 6–8.5)
PROT UR QL STRIP: ABNORMAL
RBC # BLD AUTO: 4.63 10*6/MM3 (ref 3.77–5.28)
RBC # UR STRIP: ABNORMAL /HPF
REF LAB TEST METHOD: ABNORMAL
RSV RNA RESP QL NAA+PROBE: NOT DETECTED
SARS-COV-2 RNA RESP QL NAA+PROBE: NOT DETECTED
SODIUM SERPL-SCNC: 133 MMOL/L (ref 136–145)
SP GR UR STRIP: 1.02 (ref 1–1.03)
SQUAMOUS #/AREA URNS HPF: ABNORMAL /HPF
TROPONIN T % DELTA: -10
TROPONIN T NUMERIC DELTA: -2 NG/L
TROPONIN T SERPL HS-MCNC: 20 NG/L
UROBILINOGEN UR QL STRIP: ABNORMAL
WBC # UR STRIP: ABNORMAL /HPF
WBC NRBC COR # BLD AUTO: 5.98 10*3/MM3 (ref 3.4–10.8)
WHOLE BLOOD HOLD COAG: NORMAL
WHOLE BLOOD HOLD SPECIMEN: NORMAL

## 2025-03-17 PROCEDURE — 81001 URINALYSIS AUTO W/SCOPE: CPT | Performed by: EMERGENCY MEDICINE

## 2025-03-17 PROCEDURE — 82948 REAGENT STRIP/BLOOD GLUCOSE: CPT

## 2025-03-17 PROCEDURE — G0378 HOSPITAL OBSERVATION PER HR: HCPCS

## 2025-03-17 PROCEDURE — 93005 ELECTROCARDIOGRAM TRACING: CPT | Performed by: EMERGENCY MEDICINE

## 2025-03-17 PROCEDURE — 94799 UNLISTED PULMONARY SVC/PX: CPT

## 2025-03-17 PROCEDURE — 96372 THER/PROPH/DIAG INJ SC/IM: CPT

## 2025-03-17 PROCEDURE — 96374 THER/PROPH/DIAG INJ IV PUSH: CPT

## 2025-03-17 PROCEDURE — 25810000003 SODIUM CHLORIDE 0.9 % SOLUTION: Performed by: EMERGENCY MEDICINE

## 2025-03-17 PROCEDURE — 87637 SARSCOV2&INF A&B&RSV AMP PRB: CPT | Performed by: EMERGENCY MEDICINE

## 2025-03-17 PROCEDURE — 96376 TX/PRO/DX INJ SAME DRUG ADON: CPT

## 2025-03-17 PROCEDURE — 93010 ELECTROCARDIOGRAM REPORT: CPT | Performed by: INTERNAL MEDICINE

## 2025-03-17 PROCEDURE — 84484 ASSAY OF TROPONIN QUANT: CPT | Performed by: EMERGENCY MEDICINE

## 2025-03-17 PROCEDURE — 80053 COMPREHEN METABOLIC PANEL: CPT | Performed by: EMERGENCY MEDICINE

## 2025-03-17 PROCEDURE — 71045 X-RAY EXAM CHEST 1 VIEW: CPT

## 2025-03-17 PROCEDURE — 25010000002 ONDANSETRON PER 1 MG: Performed by: EMERGENCY MEDICINE

## 2025-03-17 PROCEDURE — 85025 COMPLETE CBC W/AUTO DIFF WBC: CPT | Performed by: EMERGENCY MEDICINE

## 2025-03-17 PROCEDURE — 99285 EMERGENCY DEPT VISIT HI MDM: CPT

## 2025-03-17 PROCEDURE — 36415 COLL VENOUS BLD VENIPUNCTURE: CPT

## 2025-03-17 PROCEDURE — 93005 ELECTROCARDIOGRAM TRACING: CPT

## 2025-03-17 PROCEDURE — 83880 ASSAY OF NATRIURETIC PEPTIDE: CPT | Performed by: EMERGENCY MEDICINE

## 2025-03-17 PROCEDURE — 25010000002 ENOXAPARIN PER 10 MG: Performed by: NURSE PRACTITIONER

## 2025-03-17 PROCEDURE — 25010000002 ONDANSETRON PER 1 MG: Performed by: NURSE PRACTITIONER

## 2025-03-17 PROCEDURE — 99291 CRITICAL CARE FIRST HOUR: CPT

## 2025-03-17 RX ORDER — ENOXAPARIN SODIUM 100 MG/ML
40 INJECTION SUBCUTANEOUS NIGHTLY
Status: DISCONTINUED | OUTPATIENT
Start: 2025-03-17 | End: 2025-03-19 | Stop reason: HOSPADM

## 2025-03-17 RX ORDER — IBUPROFEN 600 MG/1
1 TABLET ORAL
Status: DISCONTINUED | OUTPATIENT
Start: 2025-03-17 | End: 2025-03-19 | Stop reason: HOSPADM

## 2025-03-17 RX ORDER — OSELTAMIVIR PHOSPHATE 75 MG/1
75 CAPSULE ORAL EVERY 12 HOURS SCHEDULED
Status: DISCONTINUED | OUTPATIENT
Start: 2025-03-17 | End: 2025-03-19 | Stop reason: HOSPADM

## 2025-03-17 RX ORDER — CLONAZEPAM 0.5 MG/1
2 TABLET ORAL NIGHTLY
COMMUNITY

## 2025-03-17 RX ORDER — INSULIN LISPRO 100 [IU]/ML
3-14 INJECTION, SOLUTION INTRAVENOUS; SUBCUTANEOUS
Status: DISCONTINUED | OUTPATIENT
Start: 2025-03-18 | End: 2025-03-19 | Stop reason: HOSPADM

## 2025-03-17 RX ORDER — BISACODYL 5 MG/1
5 TABLET, DELAYED RELEASE ORAL DAILY PRN
Status: DISCONTINUED | OUTPATIENT
Start: 2025-03-17 | End: 2025-03-19 | Stop reason: HOSPADM

## 2025-03-17 RX ORDER — ACETAMINOPHEN 650 MG/1
650 SUPPOSITORY RECTAL EVERY 8 HOURS PRN
Status: DISCONTINUED | OUTPATIENT
Start: 2025-03-17 | End: 2025-03-19 | Stop reason: HOSPADM

## 2025-03-17 RX ORDER — SODIUM CHLORIDE 9 MG/ML
40 INJECTION, SOLUTION INTRAVENOUS AS NEEDED
Status: DISCONTINUED | OUTPATIENT
Start: 2025-03-17 | End: 2025-03-19 | Stop reason: HOSPADM

## 2025-03-17 RX ORDER — SODIUM CHLORIDE 0.9 % (FLUSH) 0.9 %
10 SYRINGE (ML) INJECTION EVERY 12 HOURS SCHEDULED
Status: DISCONTINUED | OUTPATIENT
Start: 2025-03-17 | End: 2025-03-19 | Stop reason: HOSPADM

## 2025-03-17 RX ORDER — POLYETHYLENE GLYCOL 3350 17 G/17G
17 POWDER, FOR SOLUTION ORAL DAILY PRN
Status: DISCONTINUED | OUTPATIENT
Start: 2025-03-17 | End: 2025-03-19 | Stop reason: HOSPADM

## 2025-03-17 RX ORDER — CLONAZEPAM 0.5 MG/1
0.5 TABLET ORAL NIGHTLY
Status: DISCONTINUED | OUTPATIENT
Start: 2025-03-18 | End: 2025-03-19 | Stop reason: HOSPADM

## 2025-03-17 RX ORDER — CLONAZEPAM 0.5 MG/1
0.5 TABLET ORAL
COMMUNITY

## 2025-03-17 RX ORDER — NICOTINE POLACRILEX 4 MG
15 LOZENGE BUCCAL
Status: DISCONTINUED | OUTPATIENT
Start: 2025-03-17 | End: 2025-03-19 | Stop reason: HOSPADM

## 2025-03-17 RX ORDER — HYDROCODONE BITARTRATE AND ACETAMINOPHEN 7.5; 325 MG/1; MG/1
1 TABLET ORAL EVERY 8 HOURS PRN
Status: DISCONTINUED | OUTPATIENT
Start: 2025-03-17 | End: 2025-03-19 | Stop reason: HOSPADM

## 2025-03-17 RX ORDER — ONDANSETRON 2 MG/ML
4 INJECTION INTRAMUSCULAR; INTRAVENOUS EVERY 6 HOURS PRN
Status: DISCONTINUED | OUTPATIENT
Start: 2025-03-17 | End: 2025-03-19 | Stop reason: HOSPADM

## 2025-03-17 RX ORDER — ACETAMINOPHEN 325 MG/1
650 TABLET ORAL EVERY 8 HOURS PRN
Status: DISCONTINUED | OUTPATIENT
Start: 2025-03-17 | End: 2025-03-19 | Stop reason: HOSPADM

## 2025-03-17 RX ORDER — SODIUM CHLORIDE 0.9 % (FLUSH) 0.9 %
10 SYRINGE (ML) INJECTION AS NEEDED
Status: DISCONTINUED | OUTPATIENT
Start: 2025-03-17 | End: 2025-03-19 | Stop reason: HOSPADM

## 2025-03-17 RX ORDER — IPRATROPIUM BROMIDE AND ALBUTEROL SULFATE 2.5; .5 MG/3ML; MG/3ML
3 SOLUTION RESPIRATORY (INHALATION)
Status: DISCONTINUED | OUTPATIENT
Start: 2025-03-17 | End: 2025-03-19 | Stop reason: HOSPADM

## 2025-03-17 RX ORDER — BISACODYL 10 MG
10 SUPPOSITORY, RECTAL RECTAL DAILY PRN
Status: DISCONTINUED | OUTPATIENT
Start: 2025-03-17 | End: 2025-03-19 | Stop reason: HOSPADM

## 2025-03-17 RX ORDER — AMOXICILLIN 250 MG
2 CAPSULE ORAL 2 TIMES DAILY PRN
Status: DISCONTINUED | OUTPATIENT
Start: 2025-03-17 | End: 2025-03-19 | Stop reason: HOSPADM

## 2025-03-17 RX ORDER — DEXTROSE MONOHYDRATE 25 G/50ML
25 INJECTION, SOLUTION INTRAVENOUS
Status: DISCONTINUED | OUTPATIENT
Start: 2025-03-17 | End: 2025-03-19 | Stop reason: HOSPADM

## 2025-03-17 RX ORDER — ONDANSETRON 2 MG/ML
4 INJECTION INTRAMUSCULAR; INTRAVENOUS ONCE
Status: COMPLETED | OUTPATIENT
Start: 2025-03-17 | End: 2025-03-17

## 2025-03-17 RX ORDER — ACETAMINOPHEN 160 MG/5ML
650 SOLUTION ORAL EVERY 8 HOURS PRN
Status: DISCONTINUED | OUTPATIENT
Start: 2025-03-17 | End: 2025-03-19 | Stop reason: HOSPADM

## 2025-03-17 RX ORDER — ONDANSETRON 4 MG/1
4 TABLET, ORALLY DISINTEGRATING ORAL EVERY 6 HOURS PRN
Status: DISCONTINUED | OUTPATIENT
Start: 2025-03-17 | End: 2025-03-19 | Stop reason: HOSPADM

## 2025-03-17 RX ADMIN — ACETAMINOPHEN 650 MG: 325 TABLET ORAL at 19:38

## 2025-03-17 RX ADMIN — SODIUM CHLORIDE 1000 ML: 0.9 INJECTION, SOLUTION INTRAVENOUS at 15:14

## 2025-03-17 RX ADMIN — ONDANSETRON 4 MG: 2 INJECTION INTRAMUSCULAR; INTRAVENOUS at 20:29

## 2025-03-17 RX ADMIN — ONDANSETRON 4 MG: 2 INJECTION INTRAMUSCULAR; INTRAVENOUS at 15:19

## 2025-03-17 RX ADMIN — Medication 10 ML: at 20:28

## 2025-03-17 RX ADMIN — OSELTAMIVIR 75 MG: 75 CAPSULE ORAL at 20:29

## 2025-03-17 RX ADMIN — ENOXAPARIN SODIUM 40 MG: 100 INJECTION SUBCUTANEOUS at 20:29

## 2025-03-17 NOTE — H&P
[facility]   HISTORY AND PHYSICAL    Patient Name: Melania Centeno  : 1950  MRN: [id number]  Primary Care Physician:  Javon Pascal MD  Date of admission: 3/17/2025    Subjective   Subjective     Chief Complaint: weakness    HPI:    Melania Centeno is a 74 y.o. female with PMH significant for HLD, HTN, PTSD, type 2 diabetes, GERD, cirrhosis, arthritis.  Came to the emergency room complaining of coughing generalized weakness nausea vomiting diarrhea for 1 days and coughing for 2 days and patient was found to have influenza A positive.  Patient was hypoxic in the emergency room but got stabilized with 2 L oxygen and we were requested to admit this patient because of mild hypoxemia most likely secondary to influenza A  Review of Systems  All review of systems negative except as in subjective complaints:  Personal History     Past Medical History:   Diagnosis Date    Anesthesia     ptsd/pt has nightmares, but states no issues while waking  up  from  anesthesia    Arthritis     Cervical spinal stenosis 2019    Brisk reflexes suggestive of myelopathy/h/o cervical fusion    Cervicalgia     Diverticulitis of colon     Fatty liver     Foot pain     Fusion of spine of cervical region 2019    GERD (gastroesophageal reflux disease)     Hyperlipidemia     recent cardiology visit for surg clearance. no issues noted, no cp or soa    Hypertension     Leg pain     Psoriasis     PTSD (post-traumatic stress disorder)     Type 2 diabetes mellitus        Past Surgical History:   Procedure Laterality Date    BREAST BIOPSY      CERVICAL FUSION  2019    right C4-5,C5-6    CHOLECYSTECTOMY      COLONOSCOPY      COLONOSCOPY N/A 8/3/2023    Procedure: COLONOSCOPY WITH POLYPECTOMIES;  Surgeon: Neal Delaney MD;  Location: [facility] ENDOSCOPY;  Service: Gastroenterology;  Laterality: N/A;  COLON POLYPS, DIVERTICULOSIS    ENDOSCOPY N/A 8/3/2023    Procedure: ESOPHAGOGASTRODUODENOSCOPY WITH BIOPSIES;  Surgeon:  Neal Delaney MD;  Location: [facility] ENDOSCOPY;  Service: Gastroenterology;  Laterality: N/A;  HIATAL HERNIA, REFLUX ESOPHAGITIS    HEMORRHOIDECTOMY      KNEE SURGERY      TOTAL KNEE ARTHROPLASTY Left 7/29/2024    Procedure: LEFT TOTAL KNEE ARTHROPLASTY;  Surgeon: Rajeev Yañez MD;  Location: [facility] MAIN OR;  Service: Orthopedics;  Laterality: Left;    TUBAL ABDOMINAL LIGATION         Family History: family history includes Brain cancer in her sister; Breast cancer in her sister; Colon cancer (age of onset: 62) in her sister; Diabetes in her brother and mother; Heart disease in her brother and sister. Otherwise pertinent FHx was reviewed and not pertinent to current issue.    Social History:  reports that she has never smoked. She has never used smokeless tobacco. She reports that she does not drink alcohol and does not use drugs.    Home Medications:  DULoxetine, Dextromethorphan-buPROPion ER, HYDROcodone-acetaminophen, Insulin NPH (Human) (Isophane), amLODIPine, aspirin, atorvastatin, benazepril, clonazePAM, gabapentin, glipizide, insulin degludec, metoprolol succinate XL, nystatin, omeprazole, and tiZANidine      Allergies:  No Known Allergies    Objective   Objective     Vitals:   Temp:  [98 °F (36.7 °C)-99.1 °F (37.3 °C)] 98.2 °F (36.8 °C)  Heart Rate:  [67-90] 84  Resp:  [16-22] 16  BP: (113-162)/(50-81) 136/71  Flow (L/min) (Oxygen Therapy):  [2] 2  Physical Exam               Constitutional:         Awake, alert responsive, conversant, no obvious distress   Eyes:                       PERRLA, sclerae anicteric, no conjunctival injection   HEENT:                   Moist mucous membranes, no nasal or eye discharge, no throat congestion   Neck:                      Supple, no thyromegaly, no lymphadenopathy, trachea midline, no elevated JVD   Respiratory:           Clear to auscultation bilaterally, nonlabored respirations    Cardiovascular:     RRR, no murmurs, rubs, or gallops, palpable pedal pulses  bilaterally,No bilateral ankle edema   Gastrointestinal:   Positive bowel sounds, soft, nontender, nondistended, no organomegaly   Musculoskeletal:   No clubbing or cyanosis to extremities, muscle wasting, joint swelling, muscle weakness   Psychiatric:             Appropriate affect, cooperative   Neurologic:            Awake alert ,oriented x 3, strength symmetric in all extremities, Cranial Nerves grossly intact to confrontation, speech clear   Skin:                      No rashes, bruising, skin ulcers, petechiae or ecchymosis    Result Review    Result Review:  I have personally reviewed the results from the time of this admission to 3/18/2025 07:58 EDT and agree with these findings:  []  Laboratory  []  Microbiology  []  Radiology  []  EKG/Telemetry   []  Cardiology/Vascular   []  Pathology  []  Old records  []  Other:    Results from last 7 days   Lab Units 03/18/25  0507 03/17/25  1429   WBC 10*3/mm3 4.52 5.98   HEMOGLOBIN g/dL 13.3 12.7   PLATELETS 10*3/mm3 97* 120*     Results from last 7 days   Lab Units 03/18/25  0700 03/17/25  1429   SODIUM mmol/L 134* 133*   POTASSIUM mmol/L 3.6 3.9   CHLORIDE mmol/L 101 98   CO2 mmol/L 23.8 24.3   ANION GAP mmol/L 9.2 10.7   BUN mg/dL 10 12   CREATININE mg/dL 0.68 0.72   GLUCOSE mg/dL 119* 152*   EGFR mL/min/1.73 92.1 88.4   CALCIUM mg/dL 8.2* 9.0   ALBUMIN g/dL  --  3.7   BILIRUBIN mg/dL  --  1.5*   ALK PHOS U/L  --  93   ALT (SGPT) U/L  --  18   AST (SGOT) U/L  --  45*         Assessment & Plan   Assessment / Plan     Active Hospital Problems:  Active Hospital Problems    Diagnosis     **Influenza A with respiratory manifestations     Bronchospasm        Plan:   Start Tamiflu   Bronchodilators    VTE Prophylaxis:  Pharmacologic VTE prophylaxis orders are present.        CODE STATUS:    Code Status (Patient has no pulse and is not breathing): CPR (Attempt to Resuscitate)  Medical Interventions (Patient has pulse or is breathing): Full Support    Admission Status:  I  believe this patient meets observation status.    Electronically signed by CHINA Means, 03/17/25, 5:58 PM EDT.

## 2025-03-17 NOTE — ED PROVIDER NOTES
Time: 2:05 PM EDT  Date of encounter:  3/17/2025  Independent Historian/Clinical History and Information was obtained by:   Patient    History is limited by: N/A    Chief Complaint: Weakness      History of Present Illness:  Patient is a 74 y.o. year old female who presents to the emergency department for evaluation of weakness, nausea, vomiting, and diarrhea accompanied by cough for the past 2 days.  Patient denies chest pain.  Patient denies shortness of breath.  Patient has no abdominal pain.  Patient denies dysuria and urinary frequency.      Patient Care Team  Primary Care Provider: Javon Pascal MD    Past Medical History:     No Known Allergies  Past Medical History:   Diagnosis Date    Anesthesia     ptsd/pt has nightmares, but states no issues while waking  up  from  anesthesia    Arthritis     Cervical spinal stenosis 04/03/2019    Brisk reflexes suggestive of myelopathy/h/o cervical fusion    Cervicalgia     Diverticulitis of colon     Fatty liver     Foot pain     Fusion of spine of cervical region 06/19/2019    GERD (gastroesophageal reflux disease)     Hyperlipidemia     recent cardiology visit for surg clearance. no issues noted, no cp or soa    Hypertension     Leg pain     Psoriasis     PTSD (post-traumatic stress disorder)     Type 2 diabetes mellitus      Past Surgical History:   Procedure Laterality Date    BREAST BIOPSY      CERVICAL FUSION  03/14/2019    right C4-5,C5-6    CHOLECYSTECTOMY      COLONOSCOPY      COLONOSCOPY N/A 8/3/2023    Procedure: COLONOSCOPY WITH POLYPECTOMIES;  Surgeon: Neal Delaney MD;  Location: [facility] ENDOSCOPY;  Service: Gastroenterology;  Laterality: N/A;  COLON POLYPS, DIVERTICULOSIS    ENDOSCOPY N/A 8/3/2023    Procedure: ESOPHAGOGASTRODUODENOSCOPY WITH BIOPSIES;  Surgeon: Neal Delaney MD;  Location: [facility] ENDOSCOPY;  Service: Gastroenterology;  Laterality: N/A;  HIATAL HERNIA, REFLUX ESOPHAGITIS    HEMORRHOIDECTOMY      KNEE SURGERY      TOTAL KNEE  ARTHROPLASTY Left 7/29/2024    Procedure: LEFT TOTAL KNEE ARTHROPLASTY;  Surgeon: Rajeev Yañez MD;  Location: [facility] MAIN OR;  Service: Orthopedics;  Laterality: Left;    TUBAL ABDOMINAL LIGATION       Family History   Problem Relation Age of Onset    Diabetes Mother         Unspecified    Breast cancer Sister     Brain cancer Sister     Colon cancer Sister 62    Heart disease Sister     Heart disease Brother     Diabetes Brother         Unspecified       Home Medications:  Prior to Admission medications    Medication Sig Start Date End Date Taking? Authorizing Provider   amLODIPine (NORVASC) 10 MG tablet Take 1 tablet by mouth Daily.    Gia Smith MD   aspirin 325 MG EC tablet Take 1 tablet by mouth Daily.    Gia Smith MD   atorvastatin (LIPITOR) 10 MG tablet Take 1 tablet by mouth Daily. 8/17/23   Kelsey Hoyos APRN   benazepril (LOTENSIN) 40 MG tablet TAKE 1 TABLET EVERY DAY 1/22/24   Kelsey Hoyos APRN   clonazePAM (KlonoPIN) 0.5 MG tablet Take 1 tablet by mouth 3 times a day. 5/7/24   Gia Smith MD   Dextromethorphan-buPROPion ER (Auvelity)  MG tablet controlled-release Take 2 tablets by mouth Every Morning.    Gia Smith MD   DULoxetine (CYMBALTA) 60 MG capsule Take 1 capsule by mouth Daily. 12/28/22   Gia Smith MD   gabapentin (NEURONTIN) 100 MG capsule Take 1 capsule by mouth 3 (Three) Times a Day As Needed. 5/12/24   Gia Smith MD   glipizide (GLUCOTROL) 10 MG tablet Take 1 tablet by mouth 2 (Two) Times a Day Before Meals. 1/5/23   Kelsey Hoyos APRN   HumuLIN N KwikPen 100 UNIT/ML injection 15 Units every night at bedtime. 6/17/24   Gia Smith MD   HYDROcodone-acetaminophen (NORCO)  MG per tablet Take 1 tablet by mouth Every 8 (Eight) Hours As Needed for Moderate Pain or Severe Pain. 8/12/24   Gia Smith MD   insulin degludec (Tresiba FlexTouch) 100 UNIT/ML solution pen-injector  "injection Inject 20 Units under the skin into the appropriate area as directed Every Morning.    Gia Smith MD   metoprolol succinate XL (TOPROL-XL) 25 MG 24 hr tablet Take 1 tablet by mouth Daily. 7/24/23   Roly Torres MD   nystatin (MYCOSTATIN) 465030 UNIT/GM cream Apply 1 Application topically to the appropriate area as directed 2 (Two) Times a Day As Needed (rash). 8/29/24   Gia Smith MD   omeprazole (priLOSEC) 40 MG capsule Take 1 capsule by mouth Daily. APPOINTMENT NEEDED FOR ADDITIONAL REFILLS  Patient taking differently: Take 1 capsule by mouth Daily. 4/4/24   Kelsey Hoyos APRN   tiZANidine (ZANAFLEX) 4 MG tablet Take 1 tablet by mouth Every Night. 12/28/22   Gia Smith MD        Social History:   Social History     Tobacco Use    Smoking status: Never    Smokeless tobacco: Never   Vaping Use    Vaping status: Never Used   Substance Use Topics    Alcohol use: Never    Drug use: No         Review of Systems:  Review of Systems   Constitutional:  Negative for chills and fever.   HENT:  Negative for congestion, rhinorrhea and sore throat.    Eyes:  Negative for pain and visual disturbance.   Respiratory:  Positive for cough. Negative for apnea, chest tightness and shortness of breath.    Cardiovascular:  Negative for chest pain and palpitations.   Gastrointestinal:  Positive for diarrhea, nausea and vomiting. Negative for abdominal pain.   Genitourinary:  Negative for difficulty urinating and dysuria.   Musculoskeletal:  Negative for joint swelling and myalgias.   Skin:  Negative for color change.   Neurological:  Negative for seizures and headaches.   Psychiatric/Behavioral: Negative.     All other systems reviewed and are negative.       Physical Exam:  /81 (BP Location: Right arm)   Pulse 67   Temp 98 °F (36.7 °C) (Oral)   Resp 18   Ht 154.9 cm (61\")   Wt 90.6 kg (199 lb 11.8 oz)   SpO2 97%   BMI 37.74 kg/m²     Physical Exam  Vitals and nursing " note reviewed.   Constitutional:       General: She is not in acute distress.     Appearance: Normal appearance. She is not toxic-appearing.   HENT:      Head: Normocephalic and atraumatic.      Jaw: There is normal jaw occlusion.      Mouth/Throat:      Comments: (+) Dry oropharynx  Eyes:      General: Lids are normal.      Extraocular Movements: Extraocular movements intact.      Conjunctiva/sclera: Conjunctivae normal.      Pupils: Pupils are equal, round, and reactive to light.   Cardiovascular:      Rate and Rhythm: Normal rate and regular rhythm.      Pulses: Normal pulses.      Heart sounds: Normal heart sounds.   Pulmonary:      Effort: Pulmonary effort is normal. No respiratory distress.      Breath sounds: Normal breath sounds. No wheezing or rhonchi.   Abdominal:      General: Abdomen is flat.      Palpations: Abdomen is soft.      Tenderness: There is no abdominal tenderness. There is no guarding or rebound.   Musculoskeletal:         General: Normal range of motion.      Cervical back: Normal range of motion and neck supple.      Right lower leg: No edema.      Left lower leg: No edema.   Skin:     General: Skin is warm and dry.   Neurological:      Mental Status: She is alert and oriented to person, place, and time. Mental status is at baseline.   Psychiatric:         Mood and Affect: Mood normal.                    Medical Decision Making:      Comorbidities that affect care:    Hypertension, diabetes    External Notes reviewed:    Previous ED Note: Patient was last seen in the ED for shortness of breath.      The following orders were placed and all results were independently analyzed by me:  Orders Placed This Encounter   Procedures    COVID PRE-OP / PRE-PROCEDURE SCREENING ORDER (NO ISOLATION) - Swab, Nasopharynx    COVID-19, FLU A/B, RSV PCR 1 HR TAT - Swab, Nasopharynx    XR Chest 1 View    [place] Draw    Comprehensive Metabolic Panel    BNP    High Sensitivity Troponin T    CBC Auto  Differential    Urinalysis With Microscopic If Indicated (No Culture) - Urine, Clean Catch    High Sensitivity Troponin T 1Hr    Urinalysis, Microscopic Only - Urine, Clean Catch    NPO Diet NPO Type: Strict NPO    Undress & Gown    Continuous Pulse Oximetry    Vital Signs    Straight Cath    Inpatient Hospitalist Consult    General MD Inpatient Consult    Oxygen Therapy- Nasal Cannula; Titrate 1-6 LPM Per SpO2; 90 - 95%    ECG 12 Lead ED Triage Standing Order; SOA    Insert Peripheral IV    CBC & Differential    Green Top (Gel)    Lavender Top    Gold Top - SST    Light Blue Top    Extra Tubes    Gray Top       Medications Given in the Emergency Department:  Medications   sodium chloride 0.9 % flush 10 mL (has no administration in time range)   sodium chloride 0.9 % bolus 1,000 mL (0 mL Intravenous Stopped 3/17/25 1718)   ondansetron (ZOFRAN) injection 4 mg (4 mg Intravenous Given 3/17/25 1519)        ED Course:         Labs:    Lab Results (last 24 hours)       Procedure Component Value Units Date/Time    CBC & Differential [[id number]]  (Abnormal) Collected: 03/17/25 1429    Specimen: Blood Updated: 03/17/25 1446    Narrative:      The following orders were created for panel order CBC & Differential.  Procedure                               Abnormality         Status                     ---------                               -----------         ------                     CBC Auto Differential[[id number]]        Abnormal            Final result               Scan Slide[[id number]]                                                                    Please view results for these tests on the individual orders.    Comprehensive Metabolic Panel [[id number]]  (Abnormal) Collected: 03/17/25 1429    Specimen: Blood Updated: 03/17/25 1507     Glucose 152 mg/dL      BUN 12 mg/dL      Creatinine 0.72 mg/dL      Sodium 133 mmol/L      Potassium 3.9 mmol/L      Chloride 98 mmol/L      CO2 24.3 mmol/L      Calcium 9.0 mg/dL       Total Protein 7.7 g/dL      Albumin 3.7 g/dL      ALT (SGPT) 18 U/L      AST (SGOT) 45 U/L      Alkaline Phosphatase 93 U/L      Total Bilirubin 1.5 mg/dL      Globulin 4.0 gm/dL      A/G Ratio 0.9 g/dL      BUN/Creatinine Ratio 16.7     Anion Gap 10.7 mmol/L      eGFR 88.4 mL/min/1.73     Narrative:      GFR Categories in Chronic Kidney Disease (CKD)      GFR Category          GFR (mL/min/1.73)    Interpretation  G1                     90 or greater         Normal or high (1)  G2                      60-89                Mild decrease (1)  G3a                   45-59                Mild to moderate decrease  G3b                   30-44                Moderate to severe decrease  G4                    15-29                Severe decrease  G5                    14 or less           Kidney failure          (1)In the absence of evidence of kidney disease, neither GFR category G1 or G2 fulfill the criteria for CKD.    eGFR calculation 2021 CKD-EPI creatinine equation, which does not include race as a factor    BNP [[id number]]  (Abnormal) Collected: 03/17/25 1429    Specimen: Blood Updated: 03/17/25 1501     proBNP 6,959.0 pg/mL     Narrative:      This assay is used as an aid in the diagnosis of individuals suspected of having heart failure. It can be used as an aid in the diagnosis of acute decompensated heart failure (ADHF) in patients presenting with signs and symptoms of ADHF to the emergency department (ED). In addition, NT-proBNP of <300 pg/mL indicates ADHF is not likely.    Age Range Result Interpretation  NT-proBNP Concentration (pg/mL:      <50             Positive            >450                   Gray                 300-450                    Negative             <300    50-75           Positive            >900                  Gray                300-900                  Negative            <300      >75             Positive            >1800                  Gray                300-1800                   Negative            <300    High Sensitivity Troponin T [[id number]]  (Abnormal) Collected: 03/17/25 1429    Specimen: Blood Updated: 03/17/25 1507     HS Troponin T 20 ng/L     Narrative:      High Sensitive Troponin T Reference Range:  <14.0 ng/L- Negative Female for AMI  <22.0 ng/L- Negative Male for AMI  >=14 - Abnormal Female indicating possible myocardial injury.  >=22 - Abnormal Male indicating possible myocardial injury.   Clinicians would have to utilize clinical acumen, EKG, Troponin, and serial changes to determine if it is an Acute Myocardial Infarction or myocardial injury due to an underlying chronic condition.         CBC Auto Differential [[id number]]  (Abnormal) Collected: 03/17/25 1429    Specimen: Blood Updated: 03/17/25 1446     WBC 5.98 10*3/mm3      RBC 4.63 10*6/mm3      Hemoglobin 12.7 g/dL      Hematocrit 40.3 %      MCV 87.0 fL      MCH 27.4 pg      MCHC 31.5 g/dL      RDW 14.9 %      RDW-SD 47.5 fl      MPV 11.3 fL      Platelets 120 10*3/mm3      Neutrophil % 70.3 %      Lymphocyte % 17.7 %      Monocyte % 11.0 %      Eosinophil % 0.0 %      Basophil % 0.5 %      Immature Grans % 0.5 %      Neutrophils, Absolute 4.20 10*3/mm3      Lymphocytes, Absolute 1.06 10*3/mm3      Monocytes, Absolute 0.66 10*3/mm3      Eosinophils, Absolute 0.00 10*3/mm3      Basophils, Absolute 0.03 10*3/mm3      Immature Grans, Absolute 0.03 10*3/mm3      nRBC 0.0 /100 WBC     COVID PRE-OP / PRE-PROCEDURE SCREENING ORDER (NO ISOLATION) - Swab, Nasopharynx [[id number]]  (Abnormal) Collected: 03/17/25 1456    Specimen: Swab from Nasopharynx Updated: 03/17/25 0255    Narrative:      The following orders were created for panel order COVID PRE-OP / PRE-PROCEDURE SCREENING ORDER (NO ISOLATION) - Swab, Nasopharynx.  Procedure                               Abnormality         Status                     ---------                               -----------         ------                     COVID-19, FLU A/B, RSV  P...[[id number]]  Abnormal            Final result                 Please view results for these tests on the individual orders.    COVID-19, FLU A/B, RSV PCR 1 HR TAT - Swab, Nasopharynx [[id number]]  (Abnormal) Collected: 03/17/25 1456    Specimen: Swab from Nasopharynx Updated: 03/17/25 1551     COVID19 Not Detected     Influenza A PCR Detected     Influenza B PCR Not Detected     RSV, PCR Not Detected    Narrative:      Fact sheet for providers: https://www.fda.gov/media/567778/download    Fact sheet for patients: https://www.fda.gov/media/676406/download    Test performed by PCR.    Urinalysis With Microscopic If Indicated (No Culture) - Urine, Clean Catch [[id number]]  (Abnormal) Collected: 03/17/25 1512    Specimen: Urine, Clean Catch Updated: 03/17/25 1548     Color, UA Dark Yellow     Appearance, UA Clear     pH, UA 6.5     Specific Gravity, UA 1.022     Glucose, UA Negative     Ketones, UA Trace     Bilirubin, UA Small (1+)     Blood, UA Trace     Protein, UA 30 mg/dL (1+)     Leuk Esterase, UA Trace     Nitrite, UA Negative     Urobilinogen, UA 2.0 E.U./dL    Urinalysis, Microscopic Only - Urine, Clean Catch [[id number]]  (Abnormal) Collected: 03/17/25 1512    Specimen: Urine, Clean Catch Updated: 03/17/25 1557     RBC, UA 3-5 /HPF      WBC, UA 3-5 /HPF      Bacteria, UA None Seen /HPF      Squamous Epithelial Cells, UA 3-6 /HPF      Hyaline Casts, UA 0-2 /LPF      Methodology Automated Microscopy    High Sensitivity Troponin T 1Hr [[id number]]  (Abnormal) Collected: 03/17/25 1632    Specimen: Blood Updated: 03/17/25 1653     HS Troponin T 18 ng/L      Troponin T Numeric Delta -2 ng/L      Troponin T % Delta -10    Narrative:      High Sensitive Troponin T Reference Range:  <14.0 ng/L- Negative Female for AMI  <22.0 ng/L- Negative Male for AMI  >=14 - Abnormal Female indicating possible myocardial injury.  >=22 - Abnormal Male indicating possible myocardial injury.   Clinicians would have to utilize clinical  acumen, EKG, Troponin, and serial changes to determine if it is an Acute Myocardial Infarction or myocardial injury due to an underlying chronic condition.                  Imaging:    XR Chest 1 View  Result Date: 3/17/2025  XR CHEST 1 VW Date of Exam: 3/17/2025 2:00 PM EDT Indication: SOA Triage Protocol Comparison: 9/25/2024 and prior Findings: Heart size is borderline enlarged and pulmonary vascularity is congested and indistinct asymmetric increased groundglass and interstitial opacities noted at the lung bases right greater than left. Osseous structures demonstrate no acute abnormality. Sclerosis in the proximal right humerus may represent sequela of avascular necrosis. Postsurgical changes noted of the cervical spine     Impression: Cardiomegaly and mild pulmonary vascular congestion. Asymmetry in the right base may represent asymmetric edema, atelectasis or pneumonia. Please correlate clinically Electronically Signed: Bertin Chapman MD  3/17/2025 3:00 PM EDT  Workstation ID: [id number]        Differential Diagnosis and Discussion:    Cough: Differential diagnosis includes but is not limited to pneumonia, acute bronchitis, upper respiratory infection, ACE inhibitor use, allergic reaction, epiglottitis, seasonal allergies, chemical irritants, exercise-induced asthma, viral syndrome.    PROCEDURES:    Labs were collected in the emergency department and all labs were reviewed and interpreted by me.  X-ray were performed in the emergency department and all X-ray impressions were independently interpreted by me.  An EKG was performed and the EKG was interpreted by me.    ECG 12 Lead ED Triage Standing Order; SOA   Preliminary Result   HEART RATE=88  bpm   RR Fqqxcsjy=137  ms   WI Interval=  ms   P Horizontal Axis=  deg   P Front Axis=  deg   QRSD Interval=74  ms   QT Gmdtfati=094  ms   ZSgJ=699  ms   QRS Axis=45  deg   T Wave Axis=30  deg   - ABNORMAL ECG -   Atrial fibrillation   Date and Time of Study:2025-03-17  13:35:22          Procedures    MDM     Amount and/or Complexity of Data Reviewed  Decide to obtain previous medical records or to obtain history from someone other than the patient: yes       The patient´s CBC that was reviewed and interpreted by me shows no abnormalities of critical concern. Of note, there is no anemia requiring a blood transfusion and the platelet count is acceptable.  The patient´s CMP that was reviewed and interpretted by me shows no abnormalities of critical concern. Of note, the patient´s sodium and potassium are acceptable. The patient´s liver enzymes are unremarkable. The patient´s renal function (creatinine) is preserved. The patient has a normal anion gap.  Influenza swab is positive.  BNP 6959.  Patient was hypoxic and was placed on supplemental oxygen.      Total Critical Care time of 40 minutes. Total critical care time documented does not include time spent on separately billed procedures for services of nurses or physician assistants. I personally saw and examined the patient. I have reviewed all diagnostic interpretations and treatment plans as written. I was present for the key portions of any procedures performed and the inclusive time noted in any critical care statement. Critical care time includes patient management by me, time spent at the patients bedside,  time to review lab and imaging results, discussing patient care, documentation in the medical record, and time spent with family or caregiver.          Patient Care Considerations:    PERC: I used the PERC score to risk stratify the patient for PE and a CT of the chest was considered but ultimately not indicated in today's visit.      Consultants/Shared Management Plan:    Case was discussed with Dr. Foreman's practitioner who agrees to admit the patient.    Social Determinants of Health:    Patient is independent, reliable, and has access to care.       Disposition and Care Coordination:    Admit:   Through independent  evaluation of the patient's history, physical, and imperical data, the patient meets criteria for inpatient admission to the hospital.        Final diagnoses:   Influenza A        ED Disposition       ED Disposition   Intended Admit    Condition   --    Comment   --               This medical record created using voice recognition software.             Arlet Dillon MD  03/17/25 [id number]

## 2025-03-18 PROBLEM — J98.01 BRONCHOSPASM: Status: ACTIVE | Noted: 2025-03-18

## 2025-03-18 LAB
ANION GAP SERPL CALCULATED.3IONS-SCNC: 9.2 MMOL/L (ref 5–15)
BASOPHILS # BLD AUTO: 0.03 10*3/MM3 (ref 0–0.2)
BASOPHILS NFR BLD AUTO: 0.7 % (ref 0–1.5)
BUN SERPL-MCNC: 10 MG/DL (ref 8–23)
BUN/CREAT SERPL: 14.7 (ref 7–25)
CALCIUM SPEC-SCNC: 8.2 MG/DL (ref 8.6–10.5)
CHLORIDE SERPL-SCNC: 101 MMOL/L (ref 98–107)
CO2 SERPL-SCNC: 23.8 MMOL/L (ref 22–29)
CREAT SERPL-MCNC: 0.68 MG/DL (ref 0.57–1)
DEPRECATED RDW RBC AUTO: 50.3 FL (ref 37–54)
EGFRCR SERPLBLD CKD-EPI 2021: 92.1 ML/MIN/1.73
EOSINOPHIL # BLD AUTO: 0.01 10*3/MM3 (ref 0–0.4)
EOSINOPHIL NFR BLD AUTO: 0.2 % (ref 0.3–6.2)
ERYTHROCYTE [DISTWIDTH] IN BLOOD BY AUTOMATED COUNT: 14.9 % (ref 12.3–15.4)
GLUCOSE BLDC GLUCOMTR-MCNC: 116 MG/DL (ref 70–99)
GLUCOSE BLDC GLUCOMTR-MCNC: 151 MG/DL (ref 70–99)
GLUCOSE BLDC GLUCOMTR-MCNC: 167 MG/DL (ref 70–99)
GLUCOSE SERPL-MCNC: 119 MG/DL (ref 65–99)
HCT VFR BLD AUTO: 44.1 % (ref 34–46.6)
HGB BLD-MCNC: 13.3 G/DL (ref 12–15.9)
IMM GRANULOCYTES # BLD AUTO: 0.01 10*3/MM3 (ref 0–0.05)
IMM GRANULOCYTES NFR BLD AUTO: 0.2 % (ref 0–0.5)
LYMPHOCYTES # BLD AUTO: 1.08 10*3/MM3 (ref 0.7–3.1)
LYMPHOCYTES NFR BLD AUTO: 23.9 % (ref 19.6–45.3)
MCH RBC QN AUTO: 27.7 PG (ref 26.6–33)
MCHC RBC AUTO-ENTMCNC: 30.2 G/DL (ref 31.5–35.7)
MCV RBC AUTO: 91.7 FL (ref 79–97)
MONOCYTES # BLD AUTO: 0.74 10*3/MM3 (ref 0.1–0.9)
MONOCYTES NFR BLD AUTO: 16.4 % (ref 5–12)
NEUTROPHILS NFR BLD AUTO: 2.65 10*3/MM3 (ref 1.7–7)
NEUTROPHILS NFR BLD AUTO: 58.6 % (ref 42.7–76)
NRBC BLD AUTO-RTO: 0 /100 WBC (ref 0–0.2)
PLATELET # BLD AUTO: 97 10*3/MM3 (ref 140–450)
PMV BLD AUTO: 12.8 FL (ref 6–12)
POTASSIUM SERPL-SCNC: 3.6 MMOL/L (ref 3.5–5.2)
RBC # BLD AUTO: 4.81 10*6/MM3 (ref 3.77–5.28)
SODIUM SERPL-SCNC: 134 MMOL/L (ref 136–145)
WBC NRBC COR # BLD AUTO: 4.52 10*3/MM3 (ref 3.4–10.8)

## 2025-03-18 PROCEDURE — G0378 HOSPITAL OBSERVATION PER HR: HCPCS

## 2025-03-18 PROCEDURE — 80048 BASIC METABOLIC PNL TOTAL CA: CPT | Performed by: NURSE PRACTITIONER

## 2025-03-18 PROCEDURE — 85025 COMPLETE CBC W/AUTO DIFF WBC: CPT | Performed by: NURSE PRACTITIONER

## 2025-03-18 PROCEDURE — 94640 AIRWAY INHALATION TREATMENT: CPT

## 2025-03-18 PROCEDURE — 82948 REAGENT STRIP/BLOOD GLUCOSE: CPT

## 2025-03-18 PROCEDURE — 96375 TX/PRO/DX INJ NEW DRUG ADDON: CPT

## 2025-03-18 PROCEDURE — 82948 REAGENT STRIP/BLOOD GLUCOSE: CPT | Performed by: NURSE PRACTITIONER

## 2025-03-18 PROCEDURE — 25010000002 FUROSEMIDE PER 20 MG: Performed by: INTERNAL MEDICINE

## 2025-03-18 PROCEDURE — 25010000002 ENOXAPARIN PER 10 MG: Performed by: NURSE PRACTITIONER

## 2025-03-18 PROCEDURE — 36415 COLL VENOUS BLD VENIPUNCTURE: CPT | Performed by: NURSE PRACTITIONER

## 2025-03-18 PROCEDURE — 94799 UNLISTED PULMONARY SVC/PX: CPT

## 2025-03-18 PROCEDURE — 96372 THER/PROPH/DIAG INJ SC/IM: CPT

## 2025-03-18 PROCEDURE — 96376 TX/PRO/DX INJ SAME DRUG ADON: CPT

## 2025-03-18 PROCEDURE — 25010000002 ONDANSETRON PER 1 MG: Performed by: NURSE PRACTITIONER

## 2025-03-18 RX ORDER — POTASSIUM CHLORIDE 750 MG/1
40 CAPSULE, EXTENDED RELEASE ORAL ONCE
Status: COMPLETED | OUTPATIENT
Start: 2025-03-18 | End: 2025-03-18

## 2025-03-18 RX ORDER — FUROSEMIDE 10 MG/ML
60 INJECTION INTRAMUSCULAR; INTRAVENOUS ONCE
Status: COMPLETED | OUTPATIENT
Start: 2025-03-18 | End: 2025-03-18

## 2025-03-18 RX ADMIN — ENOXAPARIN SODIUM 40 MG: 100 INJECTION SUBCUTANEOUS at 21:51

## 2025-03-18 RX ADMIN — CLONAZEPAM 0.5 MG: 0.5 TABLET ORAL at 00:23

## 2025-03-18 RX ADMIN — CLONAZEPAM 0.5 MG: 0.5 TABLET ORAL at 21:52

## 2025-03-18 RX ADMIN — ONDANSETRON 4 MG: 2 INJECTION INTRAMUSCULAR; INTRAVENOUS at 10:49

## 2025-03-18 RX ADMIN — Medication 10 ML: at 09:03

## 2025-03-18 RX ADMIN — FUROSEMIDE 60 MG: 10 INJECTION, SOLUTION INTRAMUSCULAR; INTRAVENOUS at 09:00

## 2025-03-18 RX ADMIN — POTASSIUM CHLORIDE 40 MEQ: 750 CAPSULE, EXTENDED RELEASE ORAL at 08:59

## 2025-03-18 RX ADMIN — OSELTAMIVIR 75 MG: 75 CAPSULE ORAL at 21:52

## 2025-03-18 RX ADMIN — HYDROCODONE BITARTRATE AND ACETAMINOPHEN 1 TABLET: 7.5; 325 TABLET ORAL at 00:27

## 2025-03-18 RX ADMIN — IPRATROPIUM BROMIDE AND ALBUTEROL SULFATE 3 ML: .5; 3 SOLUTION RESPIRATORY (INHALATION) at 13:16

## 2025-03-18 RX ADMIN — Medication 10 ML: at 21:52

## 2025-03-18 RX ADMIN — IPRATROPIUM BROMIDE AND ALBUTEROL SULFATE 3 ML: .5; 3 SOLUTION RESPIRATORY (INHALATION) at 18:37

## 2025-03-18 RX ADMIN — OSELTAMIVIR 75 MG: 75 CAPSULE ORAL at 09:00

## 2025-03-18 RX ADMIN — ACETAMINOPHEN 650 MG: 325 TABLET ORAL at 21:52

## 2025-03-18 RX ADMIN — ONDANSETRON 4 MG: 2 INJECTION INTRAMUSCULAR; INTRAVENOUS at 21:51

## 2025-03-18 RX ADMIN — IPRATROPIUM BROMIDE AND ALBUTEROL SULFATE 3 ML: .5; 3 SOLUTION RESPIRATORY (INHALATION) at 06:50

## 2025-03-18 NOTE — PLAN OF CARE
Goal Outcome Evaluation:      Patient alert and able to make needs known, rested through the night without complaints. Will continue with plan of care

## 2025-03-18 NOTE — SIGNIFICANT NOTE
Wound Eval / Progress Noted     [place]     Patient Name: Melania Centeno  : 1950  MRN: [id number]  Today's Date: 3/18/2025                 Admit Date: 3/17/2025    Visit Dx:    ICD-10-CM ICD-9-CM   1. Influenza A  J10.1 487.1         Influenza A with respiratory manifestations    Bronchospasm        Past Medical History:   Diagnosis Date    Anesthesia     ptsd/pt has nightmares, but states no issues while waking  up  from  anesthesia    Arthritis     Cervical spinal stenosis 2019    Brisk reflexes suggestive of myelopathy/h/o cervical fusion    Cervicalgia     Diverticulitis of colon     Fatty liver     Foot pain     Fusion of spine of cervical region 2019    GERD (gastroesophageal reflux disease)     Hyperlipidemia     recent cardiology visit for surg clearance. no issues noted, no cp or soa    Hypertension     Leg pain     Psoriasis     PTSD (post-traumatic stress disorder)     Type 2 diabetes mellitus         Past Surgical History:   Procedure Laterality Date    BREAST BIOPSY      CERVICAL FUSION  2019    right C4-5,C5-6    CHOLECYSTECTOMY      COLONOSCOPY      COLONOSCOPY N/A 8/3/2023    Procedure: COLONOSCOPY WITH POLYPECTOMIES;  Surgeon: Neal Delaney MD;  Location: [facility] ENDOSCOPY;  Service: Gastroenterology;  Laterality: N/A;  COLON POLYPS, DIVERTICULOSIS    ENDOSCOPY N/A 8/3/2023    Procedure: ESOPHAGOGASTRODUODENOSCOPY WITH BIOPSIES;  Surgeon: Neal Delaney MD;  Location: [facility] ENDOSCOPY;  Service: Gastroenterology;  Laterality: N/A;  HIATAL HERNIA, REFLUX ESOPHAGITIS    HEMORRHOIDECTOMY      KNEE SURGERY      TOTAL KNEE ARTHROPLASTY Left 2024    Procedure: LEFT TOTAL KNEE ARTHROPLASTY;  Surgeon: Rajeev Yañez MD;  Location: [facility] MAIN OR;  Service: Orthopedics;  Laterality: Left;    TUBAL ABDOMINAL LIGATION           Physical Assessment:  Wound 25 2314 Bilateral anterior hip Other (Comments) (Active)   Wound Image   25 2315        Wound  "Check / Follow-up:  wound nurse consult for groin/abdominal redness. Patient moans and cries when touched especially BLE. Agreeable to assessment on 4NT.   PCA and nursing student in room throughout assessment.     Did note stool down both legs, washed, patient does moan and cry when attempting to cleanse despite education that we have to cleanse the stool from her skin.   Buttocks appears intact, slightly pink down cleft but intact.  Perineum redness, groin redness, and abdominal fold redness present, unclear if uses briefs at home. Appears as MASD currently, no open areas currently noted, powder and barrier creams in use, wound continue and add dry pads. Patient does not seem as tender to the redness as she does when attempting to care for BLE.     +strong palpable DP pulses bilaterally.   Left knee with scarring noted    asked how long pain has been present in BLE , patient responded \"years\"   Encouraged primary RN to alert MD about leg pain and crying with care.   This RN assisted PCA and nursing student to placing patient into chair/recliner.     External urinary collection system in use.     Impression: MASD to groin, perinuem, abdominal fold, buttocks intact, BLE pain noted     Short term goals:  skin care and moisture management toward regaining skin integrity.       Lien Romano RN    3/18/2025    13:31 EDT   "

## 2025-03-18 NOTE — PLAN OF CARE
Goal Outcome Evaluation:  Plan of Care Reviewed With: patient           Outcome Evaluation: Pt is alert and orient x4.  VS WNl for pt.  Pt crying regarding not feeling good.

## 2025-03-18 NOTE — PROGRESS NOTES
[facility]     Progress Note    Patient Name: Melania Centeno  : 1950  MRN: [id number]  Primary Care Physician:  Javon Pascal MD  Date of admission: 3/17/2025    Subjective   Patient is complaining of mild shortness of breath and generalized weakness  Patient is afebrile  Review of Systems  All review of systems are negative except as mentioned in subjective complaints.    Objective   Objective     Vitals:   Temp:  [98 °F (36.7 °C)-99.1 °F (37.3 °C)] 98.2 °F (36.8 °C)  Heart Rate:  [67-90] 84  Resp:  [16-22] 16  BP: (113-162)/(50-81) 136/71  Flow (L/min) (Oxygen Therapy):  [2] 2  Physical Exam    Constitutional: Awake, alert responsive, conversant, no obvious distress              Psychiatric:  Appropriate affect, cooperative   Neurologic:  Awake alert ,oriented x 3, strength symmetric in all extremities, Cranial Nerves grossly intact to confrontation, speech clear   Eyes:   PERRLA, sclerae anicteric, no conjunctival injection   HEENT:  Moist mucous membranes, no nasal or eye discharge, no throat congestion   Neck:   Supple, no thyromegaly, no lymphadenopathy, trachea midline, no elevated JVD   Respiratory:  Clear to auscultation bilaterally, nonlabored respirations    Cardiovascular: RRR, no murmurs, rubs, or gallops, palpable pedal pulses bilaterally, No bilateral ankle edema   Gastrointestinal: Positive bowel sounds, soft, nontender, nondistended, no organomegaly   Musculoskeletal:  No clubbing or cyanosis to extremities,muscle wasting, joint swelling, muscle weakness             Skin:                      No rashes, bruising, skin ulcers, petechiae or ecchymosis    Result Review    Result Review:  I have personally reviewed the results from the time of this admission to 3/18/2025 07:59 EDT and agree with these findings:  []  Laboratory  []  Microbiology  []  Radiology  []  EKG/Telemetry   []  Cardiology/Vascular   []  Pathology  []  Old records  []  Other:    Results from last 7 days   Lab Units  03/18/25  0507 03/17/25  1429   WBC 10*3/mm3 4.52 5.98   HEMOGLOBIN g/dL 13.3 12.7   PLATELETS 10*3/mm3 97* 120*     Results from last 7 days   Lab Units 03/18/25  0700 03/17/25  1429   SODIUM mmol/L 134* 133*   POTASSIUM mmol/L 3.6 3.9   CHLORIDE mmol/L 101 98   CO2 mmol/L 23.8 24.3   ANION GAP mmol/L 9.2 10.7   BUN mg/dL 10 12   CREATININE mg/dL 0.68 0.72   GLUCOSE mg/dL 119* 152*   EGFR mL/min/1.73 92.1 88.4   CALCIUM mg/dL 8.2* 9.0   ALBUMIN g/dL  --  3.7   BILIRUBIN mg/dL  --  1.5*   ALK PHOS U/L  --  93   ALT (SGPT) U/L  --  18   AST (SGOT) U/L  --  45*       Scheduled Meds:clonazePAM, 0.5 mg, Oral, Nightly  enoxaparin sodium, 40 mg, Subcutaneous, Nightly  furosemide, 60 mg, Intravenous, Once  insulin lispro, 3-14 Units, Subcutaneous, 4x Daily AC & at Bedtime  ipratropium-albuterol, 3 mL, Nebulization, 4x Daily - RT  oseltamivir, 75 mg, Oral, Q12H  potassium chloride, 40 mEq, Oral, Once  sodium chloride, 10 mL, Intravenous, Q12H      Continuous Infusions:   PRN Meds:.  acetaminophen **OR** acetaminophen **OR** acetaminophen    senna-docusate sodium **AND** polyethylene glycol **AND** bisacodyl **AND** bisacodyl    dextrose    dextrose    glucagon (human recombinant)    HYDROcodone-acetaminophen    ondansetron ODT **OR** ondansetron    sodium chloride    sodium chloride    sodium chloride    Assessment & Plan   Assessment / Plan       Active Hospital Problems:    Active Hospital Problems    Diagnosis  POA    **Influenza A with respiratory manifestations [J10.1]  Yes    Bronchospasm [J98.01]  Unknown     Mild to moderate hypoxemia         Plan:   Continue Tamiflu and bronchodilators  1 dose of Lasix       Electronically signed by Sj Mason MD, 03/18/25, 7:59 AM EDT.

## 2025-03-19 ENCOUNTER — READMISSION MANAGEMENT (OUTPATIENT)
Dept: CALL CENTER | Facility: HOSPITAL | Age: 75
End: 2025-03-19
Payer: MEDICARE

## 2025-03-19 VITALS
OXYGEN SATURATION: 98 % | HEART RATE: 70 BPM | TEMPERATURE: 98.6 F | SYSTOLIC BLOOD PRESSURE: 145 MMHG | RESPIRATION RATE: 18 BRPM | BODY MASS INDEX: 37.71 KG/M2 | HEIGHT: 61 IN | DIASTOLIC BLOOD PRESSURE: 65 MMHG | WEIGHT: 199.74 LBS

## 2025-03-19 LAB
GLUCOSE BLDC GLUCOMTR-MCNC: 118 MG/DL (ref 70–99)
GLUCOSE BLDC GLUCOMTR-MCNC: 137 MG/DL (ref 70–99)
GLUCOSE BLDC GLUCOMTR-MCNC: 139 MG/DL (ref 70–99)
QT INTERVAL: 385 MS
QTC INTERVAL: 450 MS

## 2025-03-19 PROCEDURE — 82948 REAGENT STRIP/BLOOD GLUCOSE: CPT | Performed by: NURSE PRACTITIONER

## 2025-03-19 PROCEDURE — G0378 HOSPITAL OBSERVATION PER HR: HCPCS

## 2025-03-19 PROCEDURE — 94799 UNLISTED PULMONARY SVC/PX: CPT

## 2025-03-19 PROCEDURE — 97161 PT EVAL LOW COMPLEX 20 MIN: CPT

## 2025-03-19 RX ORDER — OMEPRAZOLE 40 MG/1
40 CAPSULE, DELAYED RELEASE ORAL DAILY
Start: 2025-03-19

## 2025-03-19 RX ORDER — ALBUTEROL SULFATE 90 UG/1
2 INHALANT RESPIRATORY (INHALATION) EVERY 4 HOURS PRN
Qty: 16.7 G | Refills: 0 | Status: SHIPPED | OUTPATIENT
Start: 2025-03-19

## 2025-03-19 RX ORDER — OSELTAMIVIR PHOSPHATE 75 MG/1
75 CAPSULE ORAL EVERY 12 HOURS SCHEDULED
Qty: 6 CAPSULE | Refills: 0 | Status: SHIPPED | OUTPATIENT
Start: 2025-03-19 | End: 2025-03-22

## 2025-03-19 RX ADMIN — HYDROCODONE BITARTRATE AND ACETAMINOPHEN 1 TABLET: 7.5; 325 TABLET ORAL at 00:10

## 2025-03-19 RX ADMIN — IPRATROPIUM BROMIDE AND ALBUTEROL SULFATE 3 ML: .5; 3 SOLUTION RESPIRATORY (INHALATION) at 11:36

## 2025-03-19 RX ADMIN — IPRATROPIUM BROMIDE AND ALBUTEROL SULFATE 3 ML: .5; 3 SOLUTION RESPIRATORY (INHALATION) at 06:52

## 2025-03-19 RX ADMIN — Medication 10 ML: at 08:40

## 2025-03-19 RX ADMIN — OSELTAMIVIR 75 MG: 75 CAPSULE ORAL at 08:40

## 2025-03-19 NOTE — PLAN OF CARE
Goal Outcome Evaluation:  Plan of Care Reviewed With: patient           Outcome Evaluation: Pt is alert and orient x4.  VS WNl for pt norm.  Pt denies any pain/discomfort.  Pt had no diarrhea this shift.

## 2025-03-19 NOTE — DISCHARGE SUMMARY
[facility]   DISCHARGE SUMMARY    Patient Name: Melania Centeno  : 1950  MRN: [id number]    Date of Admission: 3/17/2025  Date of Discharge: 3/19/2025  Primary Care Physician: Javon Pascal MD    Consults       Date and Time Order Name Status Description    3/17/2025  4:30 PM General MD Inpatient Consult      3/17/2025  4:25 PM Inpatient Hospitalist Consult              Hospital Course     Presenting Problem:   Influenza A [J10.1]  Influenza A with respiratory manifestations [J10.1]    Active and resolved problems  Principal Problem:    Influenza A with respiratory manifestations  Overview:  Active Problems:    Bronchospasm  Overview:  Resolved Problems:    * No resolved hospital problems. *      Hospital Course:  Melania Centeno is a 74 y.o. female with PMH significant for HLD, HTN, PTSD, type 2 diabetes, GERD, cirrhosis, arthritis.  Came to the emergency room complaining of coughing generalized weakness nausea vomiting diarrhea for 1 days and coughing for 2 days and patient was found to have influenza A positive.  Patient was hypoxic in the emergency room but got stabilized with 2 L oxygen and She was admitted with mild hypoxemia most likely secondary to influenza A.   Her respiratory status has improved and she is on room air.  She will be discharged home and finish course of Tamiflu.      Vital Signs:  Temp:  [97.7 °F (36.5 °C)-99 °F (37.2 °C)] 98.6 °F (37 °C)  Heart Rate:  [] 70  Resp:  [16-22] 18  BP: (145-164)/(50-71) 145/65  Flow (L/min) (Oxygen Therapy):  [0-1] 0      Discharge Details        Discharge Medications        New Medications        Instructions Start Date   albuterol sulfate  (90 Base) MCG/ACT inhaler  Commonly known as: PROVENTIL HFA;VENTOLIN HFA;PROAIR HFA   2 puffs, Inhalation, Every 4 Hours PRN      oseltamivir 75 MG capsule  Commonly known as: TAMIFLU   75 mg, Oral, Every 12 Hours Scheduled             Continue These Medications        Instructions Start Date    amLODIPine 10 MG tablet  Commonly known as: NORVASC   10 mg, Daily      aspirin 325 MG EC tablet   325 mg, Oral, Daily      atorvastatin 10 MG tablet  Commonly known as: LIPITOR   10 mg, Oral, Daily      benazepril 40 MG tablet  Commonly known as: LOTENSIN   40 mg, Oral, Daily      clonazePAM 0.5 MG tablet  Commonly known as: KlonoPIN   Take 1 tablet by mouth Every Morning.      clonazePAM 0.5 MG tablet  Commonly known as: KlonoPIN   0.5 mg, Oral, Daily With Lunch      clonazePAM 0.5 MG tablet  Commonly known as: KlonoPIN   2 tablets, Oral, Nightly      DULoxetine 60 MG capsule  Commonly known as: CYMBALTA   60 mg, Daily      gabapentin 800 MG tablet  Commonly known as: NEURONTIN   Take 1 tablet by mouth 3 (Three) Times a Day As Needed.      glipizide 10 MG tablet  Commonly known as: GLUCOTROL   10 mg, Oral, 2 Times Daily Before Meals      HumuLIN N KwikPen 100 UNIT/ML injection  Generic drug: Insulin NPH (Human) (Isophane)   Inject 15 Units under the skin into the appropriate area as directed every night at bedtime.      HYDROcodone-acetaminophen  MG per tablet  Commonly known as: NORCO   Take 1 tablet by mouth Every 8 (Eight) Hours As Needed for Moderate Pain or Severe Pain.      metoprolol succinate XL 25 MG 24 hr tablet  Commonly known as: TOPROL-XL   25 mg, Oral, Daily      nystatin [id number] UNIT/GM cream  Commonly known as: MYCOSTATIN   1 Application, 2 Times Daily PRN      omeprazole 40 MG capsule  Commonly known as: priLOSEC   40 mg, Oral, Daily      tiZANidine 4 MG tablet  Commonly known as: ZANAFLEX   4 mg, Nightly      Tresiba FlexTouch 100 UNIT/ML solution pen-injector injection  Generic drug: insulin degludec   20 Units, Every Morning             ASK your doctor about these medications        Instructions Start Date   Auvelity  MG tablet controlled-release  Generic drug: Dextromethorphan-buPROPion ER   2 tablets, Every Morning               No Known Allergies      Discharge  Disposition:  Home or Self Care    Diet:        Discharge Activity:         CODE STATUS:    Code Status and Medical Interventions: CPR (Attempt to Resuscitate); Full Support   Ordered at: 03/17/25 1757     Code Status (Patient has no pulse and is not breathing):    CPR (Attempt to Resuscitate)     Medical Interventions (Patient has pulse or is breathing):    Full Support         No future appointments.        Time spent on Discharge including face to face service:  30 minutes    Electronically signed by CHINA Means, 03/19/25, 11:50 AM EDT.

## 2025-03-19 NOTE — PLAN OF CARE
Goal Outcome Evaluation:  Plan of Care Reviewed With: (P) patient           Outcome Evaluation: (P) Pt has no difficulty with ambulation and shows no risk of falls. Pt does not require skilled therapy services    Anticipated Discharge Disposition (PT): (P) home

## 2025-03-19 NOTE — OUTREACH NOTE
Prep Survey      Flowsheet Row Responses   [facility] facility patient discharged from? [facility]   Is LACE score < 7 ? Yes   Eligibility Not Eligible   What are the reasons patient is not eligible? Other   Does the patient have one of the following disease processes/diagnoses(primary or secondary)? Other   Prep survey completed? Yes            REYMUNDO BAILEY - Registered Nurse           stable

## 2025-03-19 NOTE — PLAN OF CARE
Goal Outcome Evaluation:           Progress: no change  Outcome Evaluation: Pt alert and oriented x 4, appears to be very tearful throughout shift. PRN pain medication and Zofran given and noted to be effective. Pt requests to get up to the chair mutliple times during the shift and sit for little periods at a time stating she needed to sit up for a little while. Pt was able to rest after recieving pain medication.

## 2025-03-19 NOTE — THERAPY EVALUATION
Acute Care - Physical Therapy Initial Evaluation   [place]     Patient Name: Melania Centeno  : 1950  MRN: [id number]  Today's Date: 3/19/2025      Visit Dx:     ICD-10-CM ICD-9-CM   1. Influenza A  J10.1 487.1   2. Gastroesophageal reflux disease, unspecified whether esophagitis present  K21.9 530.81   3. Difficulty in walking  R26.2 719.7     Patient Active Problem List   Diagnosis    Pelvic organ prolapse quantification stage 3 cystocele    Pelvic organ prolapse quantification stage 1 rectocele    Uterine prolapse    Urge incontinence    Atrophic vaginitis    Left hip pain    Gastroesophageal reflux disease    Type 2 diabetes mellitus with hyperglycemia, without long-term current use of insulin    Essential hypertension    Mixed hyperlipidemia    Cervical spinal stenosis    Fusion of spine of cervical region    Degeneration of lumbar intervertebral disc    Encounter for long-term (current) use of insulin    Lumbar spondylosis    Neck pain    Other cirrhosis of liver    Elevated alkaline phosphatase level    Right upper quadrant abdominal pain    Osteoporosis    Encounter for colonoscopy following colon polyp removal    Left knee pain    Osteoarthritis of both knees    S/P TKR (total knee replacement), left    Shortness of breath    Influenza A with respiratory manifestations    Bronchospasm     Past Medical History:   Diagnosis Date    Anesthesia     ptsd/pt has nightmares, but states no issues while waking  up  from  anesthesia    Arthritis     Cervical spinal stenosis 2019    Brisk reflexes suggestive of myelopathy/h/o cervical fusion    Cervicalgia     Diverticulitis of colon     Fatty liver     Foot pain     Fusion of spine of cervical region 2019    GERD (gastroesophageal reflux disease)     Hyperlipidemia     recent cardiology visit for surg clearance. no issues noted, no cp or soa    Hypertension     Leg pain     Psoriasis     PTSD (post-traumatic stress disorder)     Type 2 diabetes  mellitus      Past Surgical History:   Procedure Laterality Date    BREAST BIOPSY      CERVICAL FUSION  03/14/2019    right C4-5,C5-6    CHOLECYSTECTOMY      COLONOSCOPY      COLONOSCOPY N/A 8/3/2023    Procedure: COLONOSCOPY WITH POLYPECTOMIES;  Surgeon: Neal Delaney MD;  Location: [facility] ENDOSCOPY;  Service: Gastroenterology;  Laterality: N/A;  COLON POLYPS, DIVERTICULOSIS    ENDOSCOPY N/A 8/3/2023    Procedure: ESOPHAGOGASTRODUODENOSCOPY WITH BIOPSIES;  Surgeon: Neal Delaney MD;  Location: [facility] ENDOSCOPY;  Service: Gastroenterology;  Laterality: N/A;  HIATAL HERNIA, REFLUX ESOPHAGITIS    HEMORRHOIDECTOMY      KNEE SURGERY      TOTAL KNEE ARTHROPLASTY Left 7/29/2024    Procedure: LEFT TOTAL KNEE ARTHROPLASTY;  Surgeon: Rajeev Yañez MD;  Location: [facility] MAIN OR;  Service: Orthopedics;  Laterality: Left;    TUBAL ABDOMINAL LIGATION       PT Assessment (Last 12 Hours)       PT Evaluation and Treatment       Row Name 03/19/25 1500          Physical Therapy Time and Intention    Subjective Information no complaints (P)   -TM     Document Type evaluation (P)   -TM     Mode of Treatment individual therapy (P)   -TM     Patient Effort good (P)   -TM     Symptoms Noted During/After Treatment none (P)   -TM       Row Name 03/19/25 1500          General Information    Prior Level of Function independent:;all household mobility (P)   -TM     Equipment Currently Used at Home none (P)   -TM     Existing Precautions/Restrictions no known precautions/restrictions (P)   -TM       Row Name 03/19/25 1500          Living Environment    Current Living Arrangements home (P)   -TM     Home Accessibility stairs to enter home (P)   -TM     People in Home spouse (P)   -TM     Primary Care Provided by self (P)   -TM       Row Name 03/19/25 1500          Home Main Entrance    Number of Stairs, Main Entrance six (P)   -TM     Stair Railings, Main Entrance railings safe and in good condition (P)   -TM       Row Name  03/19/25 1500          Range of Motion (ROM)    Range of Motion ROM is WNL;bilateral lower extremities (P)   -TM       Row Name 03/19/25 1500          Strength (Manual Muscle Testing)    Strength (Manual Muscle Testing) strength is WNL;bilateral lower extremities (P)   -TM       Row Name 03/19/25 1500          Bed Mobility    Bed Mobility bed mobility (all) activities (P)   -     All Activities, [place] (Bed Mobility) independent (P)   -       Row Name 03/19/25 1500          Transfers    Transfers sit-stand transfer (P)   -       Row Name 03/19/25 1500          Sit-Stand Transfer    Sit-Stand [place] (Transfers) independent (P)   -       Row Name 03/19/25 1500          Gait/Stairs (Locomotion)    Gait/Stairs Locomotion gait/ambulation independence (P)   -     [place] Level (Gait) independent (P)   -     Patient was able to Ambulate yes (P)   -     Distance in Feet (Gait) 50 (P)   -       Row Name 03/19/25 1500          Balance    Balance Assessment standing dynamic balance (P)   -     Dynamic Standing Balance independent (P)   -TM     Position/Device Used, Standing Balance unsupported (P)   -       Row Name             Wound 03/17/25 2314 Bilateral anterior hip Other (Comments)    Wound - Properties Group Placement Date: 03/17/25  -LG Placement Time: 2314 -LG Present on Original Admission: Y  -LG Side: Bilateral  -LG Orientation: anterior  -LG Location: hip  -LG Primary Wound Type: Other  -LG Secondary Wound Type - Other: --  -LG, MASD     Retired Wound - Properties Group Placement Date: 03/17/25  -LG Placement Time: 2314 -LG Present on Original Admission: Y  -LG Side: Bilateral  -LG Orientation: anterior  -LG Location: hip  -LG    Retired Wound - Properties Group Placement Date: 03/17/25  -LG Placement Time: 2314 -LG Present on Original Admission: Y  -LG Side: Bilateral  -LG Orientation: anterior  -LG Location: hip  -LG    Retired Wound - Properties Group Date first assessed:  03/17/25  -LG Time first assessed: 2314  -LG Present on Original Admission: Y  -LG Side: Bilateral  -LG Location: hip  -LG      Row Name 03/19/25 1500          Plan of Care Review    Plan of Care Reviewed With patient (P)   -TM     Outcome Evaluation Pt has no difficulty with ambulation and shows no risk of falls. Pt does not require skilled therapy services (P)   -TM       Row Name 03/19/25 1500          Therapy Assessment/Plan (PT)    Patient/Family Therapy Goals Statement (PT) return home (P)   -TM     Criteria for Skilled Interventions Met (PT) no problems identified which require skilled intervention (P)   -TM     Therapy Frequency (PT) evaluation only (P)   -TM       Row Name 03/19/25 1500          PT Evaluation Complexity    History, PT Evaluation Complexity no personal factors and/or comorbidities (P)   -TM     Examination of Body Systems (PT Eval Complexity) total of 4 or more elements (P)   -TM     Clinical Presentation (PT Evaluation Complexity) stable (P)   -TM     Clinical Decision Making (PT Evaluation Complexity) low complexity (P)   -TM     Overall Complexity (PT Evaluation Complexity) low complexity (P)   -TM       Row Name 03/19/25 1500          Therapy Plan Review/Discharge Plan (PT)    Therapy Plan Review (PT) evaluation/treatment results reviewed;patient (P)   -TM               User Key  (r) = Recorded By, (t) = Taken By, (c) = Cosigned By      Initials Name Provider Type    LG Estrella Gomez, RN Registered Nurse    Jj Chung, PT Student PT Student                    Physical Therapy Education       Title: PT OT SLP Therapies (Done)       Topic: Physical Therapy (Done)       Point: Mobility training (Done)       Learning Progress Summary            Patient Acceptance, E,TB, VU by TM at 3/19/2025 1509                      Point: Home exercise program (Done)       Learning Progress Summary            Patient Acceptance, E,TB, VU by TM at 3/19/2025 1509                      Point: Body  mechanics (Done)       Learning Progress Summary            Patient Acceptance, E,TB, VU by TM at 3/19/2025 1509                      Point: Precautions (Done)       Learning Progress Summary            Patient Acceptance, E,TB, VU by TM at 3/19/2025 1509                                      User Key       Initials Effective Dates Name Provider Type Discipline    TM 02/04/25 -  Jj Ramos, PT Student PT Student PT                  PT Recommendation and Plan  Anticipated Discharge Disposition (PT): (P) home  Therapy Frequency (PT): (P) evaluation only  Plan of Care Reviewed With: (P) patient  Outcome Evaluation: (P) Pt has no difficulty with ambulation and shows no risk of falls. Pt does not require skilled therapy services   Outcome Measures       Row Name 03/19/25 1500             How much help from another person do you currently need...    Turning from your back to your side while in flat bed without using bedrails? 4 (P)   -TM      Moving from lying on back to sitting on the side of a flat bed without bedrails? 4 (P)   -TM      Moving to and from a bed to a chair (including a wheelchair)? 4 (P)   -TM      Standing up from a chair using your arms (e.g., wheelchair, bedside chair)? 4 (P)   -TM      Climbing 3-5 steps with a railing? 4 (P)   -TM      To walk in hospital room? 4 (P)   -TM      AM-PAC 6 Clicks Score (PT) 24 (P)   -TM         Functional Assessment    Outcome Measure Options AM-PAC 6 Clicks Basic Mobility (PT) (P)   -TM                User Key  (r) = Recorded By, (t) = Taken By, (c) = Cosigned By      Initials Name Provider Type    TM Jj Ramos, PT Student PT Student                     Time Calculation:    PT Charges       Row Name 03/19/25 1507             Time Calculation    PT Received On 03/19/25 (P)   -TM      PT Goal Re-Cert Due Date 03/28/25 (P)   -TM         Untimed Charges    PT Eval/Re-eval Minutes 25 (P)   -TM         Total Minutes    Untimed Charges Total Minutes 25 (P)   -TM        Total Minutes 25 (P)   -TM                User Key  (r) = Recorded By, (t) = Taken By, (c) = Cosigned By      Initials Name Provider Type    TM Jj Ramos, PT Student PT Student                  Therapy Charges for Today       Code Description Service Date Service Provider Modifiers Qty    10645645989 HC PT EVAL LOW COMPLEXITY 2 3/19/2025 Jj Ramos, PT Student GP 1            PT G-Codes  Outcome Measure Options: (P) AM-PAC 6 Clicks Basic Mobility (PT)  AM-PAC 6 Clicks Score (PT): (P) 24    Jj Ramos PT Student  3/19/2025

## 2025-05-02 ENCOUNTER — HOSPITAL ENCOUNTER (EMERGENCY)
Facility: HOSPITAL | Age: 75
Discharge: HOME | End: 2025-05-03
Attending: EMERGENCY MEDICINE
Payer: MEDICARE

## 2025-05-02 DIAGNOSIS — T50.905A MEDICATION REACTION, INITIAL ENCOUNTER: Primary | ICD-10-CM

## 2025-05-02 LAB
BILIRUB UR QL STRIP: NEGATIVE
CLARITY UR: CLEAR
COLOR UR: YELLOW
GLUCOSE UR STRIP-MCNC: NEGATIVE MG/DL
HGB UR QL STRIP.AUTO: NEGATIVE
KETONES UR QL STRIP: ABNORMAL
LEUKOCYTE ESTERASE UR QL STRIP.AUTO: NEGATIVE
NITRITE UR QL STRIP: NEGATIVE
PH UR STRIP.AUTO: >=9 [PH] (ref 5–8)
PROT UR QL STRIP: ABNORMAL
SP GR UR STRIP: 1.01 (ref 1–1.03)
UROBILINOGEN UR QL STRIP: ABNORMAL

## 2025-05-02 PROCEDURE — 99284 EMERGENCY DEPT VISIT MOD MDM: CPT

## 2025-05-02 PROCEDURE — 81003 URINALYSIS AUTO W/O SCOPE: CPT | Performed by: EMERGENCY MEDICINE

## 2025-05-03 ENCOUNTER — APPOINTMENT (OUTPATIENT)
Dept: CT IMAGING | Facility: HOSPITAL | Age: 75
End: 2025-05-03
Payer: MEDICARE

## 2025-05-03 ENCOUNTER — APPOINTMENT (OUTPATIENT)
Dept: GENERAL RADIOLOGY | Facility: HOSPITAL | Age: 75
End: 2025-05-03
Payer: MEDICARE

## 2025-05-03 VITALS
HEIGHT: 61 IN | WEIGHT: 195.99 LBS | RESPIRATION RATE: 20 BRPM | BODY MASS INDEX: 37 KG/M2 | OXYGEN SATURATION: 96 % | HEART RATE: 102 BPM | DIASTOLIC BLOOD PRESSURE: 97 MMHG | SYSTOLIC BLOOD PRESSURE: 163 MMHG | TEMPERATURE: 97.8 F

## 2025-05-03 LAB
ACETONE BLD QL: NEGATIVE
ALBUMIN SERPL-MCNC: 3.8 G/DL (ref 3.5–5.2)
ALBUMIN/GLOB SERPL: 0.9 G/DL
ALP SERPL-CCNC: 102 U/L (ref 39–117)
ALT SERPL W P-5'-P-CCNC: 13 U/L (ref 1–33)
ANION GAP SERPL CALCULATED.3IONS-SCNC: 19.5 MMOL/L (ref 5–15)
AST SERPL-CCNC: 29 U/L (ref 1–32)
ATMOSPHERIC PRESS: 738.4 MMHG
BASE EXCESS BLDV CALC-SCNC: -0.6 MMOL/L (ref -2–2)
BASOPHILS # BLD AUTO: 0.06 10*3/MM3 (ref 0–0.2)
BASOPHILS NFR BLD AUTO: 0.6 % (ref 0–1.5)
BILIRUB SERPL-MCNC: 2.1 MG/DL (ref 0–1.2)
BUN SERPL-MCNC: 9 MG/DL (ref 8–23)
BUN/CREAT SERPL: 16.7 (ref 7–25)
CA-I BLDA-SCNC: 1.14 MMOL/L (ref 1.13–1.32)
CALCIUM SPEC-SCNC: 9.5 MG/DL (ref 8.6–10.5)
CHLORIDE BLDA-SCNC: 108 MMOL/L (ref 98–107)
CHLORIDE SERPL-SCNC: 99 MMOL/L (ref 98–107)
CO2 SERPL-SCNC: 16.5 MMOL/L (ref 22–29)
CREAT SERPL-MCNC: 0.54 MG/DL (ref 0.57–1)
D-LACTATE SERPL-SCNC: 3 MMOL/L
DEPRECATED RDW RBC AUTO: 47.7 FL (ref 37–54)
EGFRCR SERPLBLD CKD-EPI 2021: 97.4 ML/MIN/1.73
EOSINOPHIL # BLD AUTO: 0.01 10*3/MM3 (ref 0–0.4)
EOSINOPHIL NFR BLD AUTO: 0.1 % (ref 0.3–6.2)
ERYTHROCYTE [DISTWIDTH] IN BLOOD BY AUTOMATED COUNT: 15.7 % (ref 12.3–15.4)
ETHANOL BLD-MCNC: <10 MG/DL (ref 0–10)
ETHANOL UR QL: <0.01 %
GLOBULIN UR ELPH-MCNC: 4.1 GM/DL
GLUCOSE BLDC GLUCOMTR-MCNC: 204 MG/DL (ref 65–99)
GLUCOSE SERPL-MCNC: 191 MG/DL (ref 65–99)
HCO3 BLDV-SCNC: 20.3 MMOL/L (ref 22–26)
HCT VFR BLD AUTO: 42.8 % (ref 34–46.6)
HGB BLD-MCNC: 14.4 G/DL (ref 12–15.9)
HGB BLDA-MCNC: 16.5 G/DL (ref 12–18)
HOLD SPECIMEN: NORMAL
HOLD SPECIMEN: NORMAL
IMM GRANULOCYTES # BLD AUTO: 0.03 10*3/MM3 (ref 0–0.05)
IMM GRANULOCYTES NFR BLD AUTO: 0.3 % (ref 0–0.5)
LYMPHOCYTES # BLD AUTO: 1.56 10*3/MM3 (ref 0.7–3.1)
LYMPHOCYTES NFR BLD AUTO: 15.7 % (ref 19.6–45.3)
MCH RBC QN AUTO: 28.5 PG (ref 26.6–33)
MCHC RBC AUTO-ENTMCNC: 33.6 G/DL (ref 31.5–35.7)
MCV RBC AUTO: 84.6 FL (ref 79–97)
MODALITY: ABNORMAL
MONOCYTES # BLD AUTO: 0.88 10*3/MM3 (ref 0.1–0.9)
MONOCYTES NFR BLD AUTO: 8.8 % (ref 5–12)
NEUTROPHILS NFR BLD AUTO: 7.41 10*3/MM3 (ref 1.7–7)
NEUTROPHILS NFR BLD AUTO: 74.5 % (ref 42.7–76)
NRBC BLD AUTO-RTO: 0 /100 WBC (ref 0–0.2)
NT-PROBNP SERPL-MCNC: 1535 PG/ML (ref 0–900)
OSMOLALITY SERPL: 290 MOSM/KG (ref 280–301)
PCO2 BLDV: 24.7 MM HG (ref 41–51)
PH BLDV: 7.52 PH UNITS (ref 7.31–7.41)
PLATELET # BLD AUTO: 142 10*3/MM3 (ref 140–450)
PMV BLD AUTO: 11.1 FL (ref 6–12)
PO2 BLDV: 51.8 MM HG (ref 35–42)
POTASSIUM BLDA-SCNC: 3.6 MMOL/L (ref 3.5–5)
POTASSIUM SERPL-SCNC: 4.2 MMOL/L (ref 3.5–5.2)
PROT SERPL-MCNC: 7.9 G/DL (ref 6–8.5)
QT INTERVAL: 399 MS
QTC INTERVAL: 491 MS
RBC # BLD AUTO: 5.06 10*6/MM3 (ref 3.77–5.28)
SAO2 % BLDCOV: 90.6 % (ref 45–75)
SODIUM BLD-SCNC: 139 MMOL/L (ref 131–143)
SODIUM SERPL-SCNC: 135 MMOL/L (ref 136–145)
WBC NRBC COR # BLD AUTO: 9.95 10*3/MM3 (ref 3.4–10.8)
WHOLE BLOOD HOLD COAG: NORMAL
WHOLE BLOOD HOLD SPECIMEN: NORMAL

## 2025-05-03 PROCEDURE — 83605 ASSAY OF LACTIC ACID: CPT

## 2025-05-03 PROCEDURE — 25810000003 SODIUM CHLORIDE 0.9 % SOLUTION: Performed by: EMERGENCY MEDICINE

## 2025-05-03 PROCEDURE — 25010000002 KETOROLAC TROMETHAMINE PER 15 MG: Performed by: EMERGENCY MEDICINE

## 2025-05-03 PROCEDURE — 36415 COLL VENOUS BLD VENIPUNCTURE: CPT

## 2025-05-03 PROCEDURE — 25010000002 MIDAZOLAM PER 1MG: Performed by: EMERGENCY MEDICINE

## 2025-05-03 PROCEDURE — 94799 UNLISTED PULMONARY SVC/PX: CPT

## 2025-05-03 PROCEDURE — 83880 ASSAY OF NATRIURETIC PEPTIDE: CPT | Performed by: EMERGENCY MEDICINE

## 2025-05-03 PROCEDURE — 82330 ASSAY OF CALCIUM: CPT

## 2025-05-03 PROCEDURE — 85025 COMPLETE CBC W/AUTO DIFF WBC: CPT | Performed by: EMERGENCY MEDICINE

## 2025-05-03 PROCEDURE — 94640 AIRWAY INHALATION TREATMENT: CPT

## 2025-05-03 PROCEDURE — 82009 KETONE BODYS QUAL: CPT | Performed by: EMERGENCY MEDICINE

## 2025-05-03 PROCEDURE — 71045 X-RAY EXAM CHEST 1 VIEW: CPT

## 2025-05-03 PROCEDURE — 80053 COMPREHEN METABOLIC PANEL: CPT | Performed by: EMERGENCY MEDICINE

## 2025-05-03 PROCEDURE — 93005 ELECTROCARDIOGRAM TRACING: CPT | Performed by: EMERGENCY MEDICINE

## 2025-05-03 PROCEDURE — 82948 REAGENT STRIP/BLOOD GLUCOSE: CPT

## 2025-05-03 PROCEDURE — 83930 ASSAY OF BLOOD OSMOLALITY: CPT | Performed by: EMERGENCY MEDICINE

## 2025-05-03 PROCEDURE — 96375 TX/PRO/DX INJ NEW DRUG ADDON: CPT

## 2025-05-03 PROCEDURE — 25010000002 ONDANSETRON PER 1 MG: Performed by: EMERGENCY MEDICINE

## 2025-05-03 PROCEDURE — 82077 ASSAY SPEC XCP UR&BREATH IA: CPT | Performed by: EMERGENCY MEDICINE

## 2025-05-03 PROCEDURE — 80051 ELECTROLYTE PANEL: CPT

## 2025-05-03 PROCEDURE — 96374 THER/PROPH/DIAG INJ IV PUSH: CPT

## 2025-05-03 PROCEDURE — 70450 CT HEAD/BRAIN W/O DYE: CPT

## 2025-05-03 PROCEDURE — 82803 BLOOD GASES ANY COMBINATION: CPT

## 2025-05-03 RX ORDER — IPRATROPIUM BROMIDE AND ALBUTEROL SULFATE 2.5; .5 MG/3ML; MG/3ML
3 SOLUTION RESPIRATORY (INHALATION) ONCE
Status: COMPLETED | OUTPATIENT
Start: 2025-05-03 | End: 2025-05-03

## 2025-05-03 RX ORDER — ONDANSETRON 2 MG/ML
4 INJECTION INTRAMUSCULAR; INTRAVENOUS ONCE
Status: COMPLETED | OUTPATIENT
Start: 2025-05-03 | End: 2025-05-03

## 2025-05-03 RX ORDER — MIDAZOLAM HYDROCHLORIDE 2 MG/2ML
1 INJECTION, SOLUTION INTRAMUSCULAR; INTRAVENOUS ONCE
Status: COMPLETED | OUTPATIENT
Start: 2025-05-03 | End: 2025-05-03

## 2025-05-03 RX ORDER — KETOROLAC TROMETHAMINE 15 MG/ML
15 INJECTION, SOLUTION INTRAMUSCULAR; INTRAVENOUS ONCE
Status: COMPLETED | OUTPATIENT
Start: 2025-05-03 | End: 2025-05-03

## 2025-05-03 RX ADMIN — IPRATROPIUM BROMIDE AND ALBUTEROL SULFATE 3 ML: .5; 3 SOLUTION RESPIRATORY (INHALATION) at 01:09

## 2025-05-03 RX ADMIN — SODIUM CHLORIDE 1000 ML: 9 INJECTION, SOLUTION INTRAVENOUS at 02:18

## 2025-05-03 RX ADMIN — KETOROLAC TROMETHAMINE 15 MG: 15 INJECTION, SOLUTION INTRAMUSCULAR; INTRAVENOUS at 02:20

## 2025-05-03 RX ADMIN — ONDANSETRON 4 MG: 2 INJECTION INTRAMUSCULAR; INTRAVENOUS at 00:13

## 2025-05-03 RX ADMIN — MIDAZOLAM HYDROCHLORIDE 1 MG: 1 INJECTION, SOLUTION INTRAMUSCULAR; INTRAVENOUS at 00:58

## 2025-05-03 NOTE — ED NOTES
EMS states pt c/o taking a new medication, hydralazine 50mg, tonight and thinks it could be a reaction to it. Pt c/o h/a for 3 weeks, general pain, and SOA

## 2025-05-03 NOTE — ED PROVIDER NOTES
Time: 12:50 AM EDT  Date of encounter:  5/2/2025  Independent Historian/Clinical History and Information was obtained by:   Patient    History is limited by: N/A    Chief Complaint: Poor historian      History of Present Illness:  Patient is a 74 y.o. year old female who presents to the emergency department for evaluation of questionable reaction to medication.  Patient states she took a new medication tonight for the first time.  Per EMS report it is hydroxyzine 50 mg tablet.  Within 30 minutes to 1 hour of take the medication she complains of hot and cold flashes, generalized weakness and tremors.  She also has numerous other more chronic complaints, mostly a persistent severe headache for the past 3 weeks.  She denies any fever.  No abdominal pain or vomiting.      Patient Care Team  Primary Care Provider: Javon Pascal MD    Past Medical History:     No Known Allergies  Past Medical History:   Diagnosis Date    Anesthesia     ptsd/pt has nightmares, but states no issues while waking  up  from  anesthesia    Arthritis     Cervical spinal stenosis 04/03/2019    Brisk reflexes suggestive of myelopathy/h/o cervical fusion    Cervicalgia     Diverticulitis of colon     Fatty liver     Foot pain     Fusion of spine of cervical region 06/19/2019    GERD (gastroesophageal reflux disease)     Hyperlipidemia     recent cardiology visit for surg clearance. no issues noted, no cp or soa    Hypertension     Leg pain     Psoriasis     PTSD (post-traumatic stress disorder)     Type 2 diabetes mellitus      Past Surgical History:   Procedure Laterality Date    BREAST BIOPSY      CERVICAL FUSION  03/14/2019    right C4-5,C5-6    CHOLECYSTECTOMY      COLONOSCOPY      COLONOSCOPY N/A 8/3/2023    Procedure: COLONOSCOPY WITH POLYPECTOMIES;  Surgeon: Neal Delaney MD;  Location: [facility] ENDOSCOPY;  Service: Gastroenterology;  Laterality: N/A;  COLON POLYPS, DIVERTICULOSIS    ENDOSCOPY N/A 8/3/2023    Procedure:  ESOPHAGOGASTRODUODENOSCOPY WITH BIOPSIES;  Surgeon: Neal Delaney MD;  Location: [facility] ENDOSCOPY;  Service: Gastroenterology;  Laterality: N/A;  HIATAL HERNIA, REFLUX ESOPHAGITIS    HEMORRHOIDECTOMY      KNEE SURGERY      TOTAL KNEE ARTHROPLASTY Left 7/29/2024    Procedure: LEFT TOTAL KNEE ARTHROPLASTY;  Surgeon: Rajeev Yañez MD;  Location: [facility] MAIN OR;  Service: Orthopedics;  Laterality: Left;    TUBAL ABDOMINAL LIGATION       Family History   Problem Relation Age of Onset    Diabetes Mother         Unspecified    Breast cancer Sister     Brain cancer Sister     Colon cancer Sister 62    Heart disease Sister     Heart disease Brother     Diabetes Brother         Unspecified       Home Medications:  Prior to Admission medications    Medication Sig Start Date End Date Taking? Authorizing Provider   albuterol sulfate  (90 Base) MCG/ACT inhaler Inhale 2 puffs Every 4 (Four) Hours As Needed for Wheezing. 3/19/25   Peyton Cunningham APRN   amLODIPine (NORVASC) 10 MG tablet Take 1 tablet by mouth Daily.    Gia Smith MD   aspirin 325 MG EC tablet Take 1 tablet by mouth Daily.    Gia Smith MD   atorvastatin (LIPITOR) 10 MG tablet Take 1 tablet by mouth Daily. 8/17/23   Kelsey Hoyos APRN   benazepril (LOTENSIN) 40 MG tablet TAKE 1 TABLET EVERY DAY 1/22/24   Kelsey Hoyos APRN   clonazePAM (KlonoPIN) 0.5 MG tablet Take 1 tablet by mouth Every Morning. 5/7/24   Gia Smith MD   clonazePAM (KlonoPIN) 0.5 MG tablet Take 1 tablet by mouth Daily With Lunch.    Gia Smith MD   clonazePAM (KlonoPIN) 0.5 MG tablet Take 2 tablets by mouth Every Night.    Gia Smith MD   Dextromethorphan-buPROPion ER (Auvelity)  MG tablet controlled-release Take 2 tablets by mouth Every Morning.  Patient not taking: Reported on 3/17/2025    Gia Smith MD   DULoxetine (CYMBALTA) 60 MG capsule Take 1 capsule by mouth Daily. 12/28/22    Gia Smith MD   gabapentin (NEURONTIN) 800 MG tablet Take 1 tablet by mouth 3 (Three) Times a Day As Needed. 5/12/24   Gia Smith MD   glipizide (GLUCOTROL) 10 MG tablet Take 1 tablet by mouth 2 (Two) Times a Day Before Meals. 1/5/23   Kelsey Hoyos APRN   HumuLIN N KwikPen 100 UNIT/ML injection Inject 15 Units under the skin into the appropriate area as directed every night at bedtime. 6/17/24   Gia Smith MD   HYDROcodone-acetaminophen (NORCO)  MG per tablet Take 1 tablet by mouth Every 8 (Eight) Hours As Needed for Moderate Pain or Severe Pain. 8/12/24   Gia Smith MD   insulin degludec (Tresiba FlexTouch) 100 UNIT/ML solution pen-injector injection Inject 20 Units under the skin into the appropriate area as directed Every Morning.    Gia Smith MD   metoprolol succinate XL (TOPROL-XL) 25 MG 24 hr tablet Take 1 tablet by mouth Daily. 7/24/23   Roly Torres MD   nystatin (MYCOSTATIN) 283929 UNIT/GM cream Apply 1 Application topically to the appropriate area as directed 2 (Two) Times a Day As Needed (rash). 8/29/24   Gia Smith MD   omeprazole (priLOSEC) 40 MG capsule Take 1 capsule by mouth Daily. 3/19/25   Peyton Cunningham APRN   tiZANidine (ZANAFLEX) 4 MG tablet Take 1 tablet by mouth Every Night. 12/28/22   Gia Smith MD        Social History:   Social History     Tobacco Use    Smoking status: Never    Smokeless tobacco: Never   Vaping Use    Vaping status: Never Used   Substance Use Topics    Alcohol use: Never    Drug use: No         Review of Systems:  Review of Systems   Constitutional:  Positive for fatigue. Negative for chills and fever.   HENT:  Negative for congestion, rhinorrhea and sore throat.    Eyes:  Negative for photophobia.   Respiratory:  Negative for apnea, cough, chest tightness and shortness of breath.    Cardiovascular:  Negative for chest pain and palpitations.   Gastrointestinal:  Negative  "for abdominal pain, diarrhea, nausea and vomiting.   Endocrine: Negative.    Genitourinary:  Negative for difficulty urinating and dysuria.   Musculoskeletal:  Negative for back pain, joint swelling and myalgias.   Skin:  Negative for color change and wound.   Allergic/Immunologic: Negative.    Neurological:  Positive for tremors, weakness and headaches. Negative for seizures.   Psychiatric/Behavioral:  The patient is nervous/anxious.    All other systems reviewed and are negative.       Physical Exam:  /97 (BP Location: Left arm, Patient Position: Sitting)   Pulse 102   Temp 97.8 °F (36.6 °C) (Oral)   Resp 20   Ht 154.9 cm (61\")   Wt 88.9 kg (195 lb 15.8 oz)   SpO2 96%   BMI 37.03 kg/m²     Physical Exam  Vitals and nursing note reviewed.   Constitutional:       General: She is awake.      Appearance: Normal appearance.   HENT:      Head: Normocephalic and atraumatic.      Nose: Nose normal.      Mouth/Throat:      Mouth: Mucous membranes are moist.      Comments: No angioedema  Eyes:      Extraocular Movements: Extraocular movements intact.      Pupils: Pupils are equal, round, and reactive to light.   Cardiovascular:      Rate and Rhythm: Normal rate and regular rhythm.      Heart sounds: Normal heart sounds.   Pulmonary:      Breath sounds: No rhonchi or rales.      Comments: Tachypneic but good ventilation.  Faint mild end expiratory wheeze  Abdominal:      General: Bowel sounds are normal.      Palpations: Abdomen is soft.      Tenderness: There is no abdominal tenderness. There is no guarding or rebound.      Comments: No rigidity   Musculoskeletal:         General: No tenderness. Normal range of motion.      Cervical back: Normal range of motion and neck supple.   Skin:     General: Skin is warm and dry.      Coloration: Skin is not jaundiced.      Comments: No urticaria   Neurological:      General: No focal deficit present.      Mental Status: Mental status is at baseline.   Psychiatric:      " Comments: Incredibly anxious.  Hyperventilating                    Medical Decision Making:      Comorbidities that affect care:    Hypertension, type 2 diabetes, GERD, PTSD    External Notes reviewed:    Hospital Discharge Summary:    3/17/2025 - 3/19/2025 (2 days)  [facility] Sj Ni MD  Last attending  Treatment team Influenza A with respiratory manifestations  Principal problem     Discharge Summary  Peyton Cunningham APRN (Nurse Practitioner)  Nephrology  Cosigned by: Sj Mason MD at 25 1630       Attestation signed by Sj Mason MD at 25 1630     I have reviewed this documentation and agree.               Expand All Collapse All     [facility]   DISCHARGE SUMMARY     Patient Name: Melania Centeno  : 1950  MRN: [id number]     Date of Admission: 3/17/2025  Date of Discharge: 3/19/2025  Primary Care Physician: Javon Pascal MD     Consults         Date and Time Order Name Status Description     3/17/2025  4:30 PM General MD Inpatient Consult         3/17/2025  4:25 PM Inpatient Hospitalist Consult                    Hospital Course      Presenting Problem:   Influenza A [J10.1]  Influenza A with respiratory manifestations [J10.1]     Active and resolved problems  Principal Problem:    Influenza A with respiratory manifestations  Overview:  Active Problems:    Bronchospasm  Overview:  Resolved Problems:    * No resolved hospital problems. *               The following orders were placed and all results were independently analyzed by me:  Orders Placed This Encounter   Procedures    XR Chest 1 View    CT Head Without Contrast    Comprehensive Metabolic Panel    [place] Draw    Urinalysis With Culture If Indicated - Urine, Clean Catch    CBC Auto Differential    Ethanol    Acetone    Osmolality, Serum    Venous Blood Gas + Electrolytes    BNP    Blood Gas, Venous -    STAT Lactic Acid, Reflex    POC Glucose Once    POC Lactate    POC Electrolyte  Panel    ECG 12 Lead Dyspnea    CBC & Differential    Green Top (Gel)    Lavender Top    Gold Top - SST    Light Blue Top       Medications Given in the Emergency Department:  Medications   ondansetron (ZOFRAN) injection 4 mg (4 mg Intravenous Given 5/3/25 0013)   ipratropium-albuterol (DUO-NEB) nebulizer solution 3 mL (3 mL Nebulization Given 5/3/25 0109)   Midazolam HCl (PF) (VERSED) injection 1 mg (1 mg Intravenous Given 5/3/25 0058)   sodium chloride 0.9 % bolus 1,000 mL (0 mL Intravenous Stopped 5/3/25 0245)   ketorolac (TORADOL) injection 15 mg (15 mg Intravenous Given 5/3/25 0220)        ED Course:    ED Course as of 05/03/25 0256   Sat May 03, 2025   0244 I have personally interpreted the EKG today and it shows no evidence of any acute ischemia or heart arrhythmia. [RP]      ED Course User Index  [RP] Jose Lomax MD       Labs:    Lab Results (last 24 hours)       Procedure Component Value Units Date/Time    Urinalysis With Culture If Indicated - Urine, Clean Catch [[id number]]  (Abnormal) Collected: 05/02/25 2333    Specimen: Urine, Clean Catch Updated: 05/02/25 2344     Color, UA Yellow     Appearance, UA Clear     pH, UA >=9.0     Specific Gravity, UA 1.010     Glucose, UA Negative     Ketones, UA 15 mg/dL (1+)     Bilirubin, UA Negative     Blood, UA Negative     Protein, UA Trace     Leuk Esterase, UA Negative     Nitrite, UA Negative     Urobilinogen, UA 1.0 E.U./dL    Narrative:      In absence of clinical symptoms, the presence of pyuria, bacteria, and/or nitrites on the urinalysis result does not correlate with infection.  Urine microscopic not indicated.    Acetone [[id number]]  (Normal) Collected: 05/03/25 0029    Specimen: Blood Updated: 05/03/25 0218     Acetone Negative    Osmolality, Serum [[id number]]  (Normal) Collected: 05/03/25 0029    Specimen: Blood Updated: 05/03/25 0235     Osmolality 290 mOsm/kg     CBC & Differential [[id number]]  (Abnormal) Collected: 05/03/25 0045    Specimen:  Blood Updated: 05/03/25 0126    Narrative:      The following orders were created for panel order CBC & Differential.  Procedure                               Abnormality         Status                     ---------                               -----------         ------                     CBC Auto Differential[[id number]]        Abnormal            Final result               Scan Slide[[id number]]                                                                    Please view results for these tests on the individual orders.    Comprehensive Metabolic Panel [[id number]]  (Abnormal) Collected: 05/03/25 0045    Specimen: Blood Updated: 05/03/25 0112     Glucose 191 mg/dL      BUN 9 mg/dL      Creatinine 0.54 mg/dL      Sodium 135 mmol/L      Potassium 4.2 mmol/L      Comment: Slight hemolysis detected by analyzer. Result may be falsely elevated.        Chloride 99 mmol/L      CO2 16.5 mmol/L      Calcium 9.5 mg/dL      Total Protein 7.9 g/dL      Albumin 3.8 g/dL      ALT (SGPT) 13 U/L      AST (SGOT) 29 U/L      Alkaline Phosphatase 102 U/L      Total Bilirubin 2.1 mg/dL      Globulin 4.1 gm/dL      A/G Ratio 0.9 g/dL      BUN/Creatinine Ratio 16.7     Anion Gap 19.5 mmol/L      eGFR 97.4 mL/min/1.73     Narrative:      GFR Categories in Chronic Kidney Disease (CKD)              GFR Category          GFR (mL/min/1.73)    Interpretation  G1                    90 or greater        Normal or high (1)  G2                    60-89                Mild decrease (1)  G3a                   45-59                Mild to moderate decrease  G3b                   30-44                Moderate to severe decrease  G4                    15-29                Severe decrease  G5                    14 or less           Kidney failure    (1)In the absence of evidence of kidney disease, neither GFR category G1 or G2 fulfill the criteria for CKD.    eGFR calculation 2021 CKD-EPI creatinine equation, which does not include race as a factor     Ethanol [[id number]] Collected: 05/03/25 0045    Specimen: Blood Updated: 05/03/25 0205     Ethanol <10 mg/dL      Ethanol % <0.010 %     Narrative:      Ethanol (Plasma)  <10 Essentially Negative    Toxic Concentrations           mg/dL    Flushing, slowing of reflexes    Impaired visual activity         Depression of CNS              >100  Possible Coma                  >300       BNP [[id number]]  (Abnormal) Collected: 05/03/25 0045    Specimen: Blood Updated: 05/03/25 0209     proBNP 1,535.0 pg/mL     Narrative:      This assay is used as an aid in the diagnosis of individuals suspected of having heart failure. It can be used as an aid in the diagnosis of acute decompensated heart failure (ADHF) in patients presenting with signs and symptoms of ADHF to the emergency department (ED). In addition, NT-proBNP of <300 pg/mL indicates ADHF is not likely.    Age Range Result Interpretation  NT-proBNP Concentration (pg/mL:      <50             Positive            >450                   Gray                 300-450                    Negative             <300    50-75           Positive            >900                  Gray                300-900                  Negative            <300      >75             Positive            >1800                  Gray                300-1800                  Negative            <300    CBC Auto Differential [[id number]]  (Abnormal) Collected: 05/03/25 0111    Specimen: Blood Updated: 05/03/25 0126     WBC 9.95 10*3/mm3      RBC 5.06 10*6/mm3      Hemoglobin 14.4 g/dL      Hematocrit 42.8 %      MCV 84.6 fL      MCH 28.5 pg      MCHC 33.6 g/dL      RDW 15.7 %      RDW-SD 47.7 fl      MPV 11.1 fL      Platelets 142 10*3/mm3      Neutrophil % 74.5 %      Lymphocyte % 15.7 %      Monocyte % 8.8 %      Eosinophil % 0.1 %      Basophil % 0.6 %      Immature Grans % 0.3 %      Neutrophils, Absolute 7.41 10*3/mm3      Lymphocytes, Absolute 1.56 10*3/mm3      Monocytes, Absolute  0.88 10*3/mm3      Eosinophils, Absolute 0.01 10*3/mm3      Basophils, Absolute 0.06 10*3/mm3      Immature Grans, Absolute 0.03 10*3/mm3      nRBC 0.0 /100 WBC     POC Glucose Once [[id number]]  (Abnormal) Collected: 05/03/25 0209    Specimen: Venous Blood Updated: 05/03/25 0211     Glucose 204 mg/dL      Comment: Serial Number: [id number]:  [id number]       Blood Gas, Venous - [[id number]]  (Abnormal) Collected: 05/03/25 0209    Specimen: Venous Blood Updated: 05/03/25 0211     pH, Venous 7.522 pH Units      pCO2, Venous 24.7 mm Hg      pO2, Venous 51.8 mm Hg      HCO3, Venous 20.3 mmol/L      Base Excess, Venous -0.6 mmol/L      Comment: Serial Number: [id number]:  [id number]        O2 Saturation, Venous 90.6 %      Hemoglobin, Blood Gas 16.5 g/dL      Barometric Pressure for Blood Gas 738.4000 mmHg      Modality Room Air    POC Lactate [[id number]]  (Abnormal) Collected: 05/03/25 0209    Specimen: Venous Blood Updated: 05/03/25 0211     Lactate 3.0 mmol/L      Comment: Serial Number: [id number]:  [id number]       POC Electrolyte Panel [[id number]]  (Abnormal) Collected: 05/03/25 0209    Specimen: Venous Blood Updated: 05/03/25 0211     Sodium 139 mmol/L      POC Potassium 3.6 mmol/L      Chloride 108 mmol/L      Ionized Calcium 1.14 mmol/L      Comment: Serial Number: [id number]:  [id number]                Imaging:    CT Head Without Contrast  Result Date: 5/3/2025  CT HEAD WO CONTRAST-  Date of exam: 5/3/2025 2:08 AM.  Indication: Severe headache.  Comparison: None available.  TECHNIQUE: Axial CT images were obtained of the head without contrast administration. Reconstructed 2D coronal and sagittal images were also obtained. Automated exposure control and iterative construction methods were used. Additionally, the radiation dose reduction device was turned on for each scan per the ALARA (As Low as Reasonably Achievable) protocol. Please see the electronic medical record, EMR (i.e., Epic), for the documented radiation  dose.  FINDINGS: A routine nonenhanced head CT was performed. No acute brain abnormality is seen. No acute infarct. No acute intracranial hemorrhage. No midline shift or acute intracranial mass effect is seen. The ventricular system and, to a greater degree, the extra-axial spaces are prominent, suggesting diffuse cortical atrophy. There is suspected mild chronic small vessel ischemic disease. Arterial calcifications are seen. No acute skull fracture is identified. There is chronic leftward nasoseptal deviation (posteriorly). Mild chronic rightward nasoseptal deviation is seen anteriorly. Mild age-indeterminate mucosal thickening and/or retained secretions involve(s) the imaged paranasal sinuses. No air-fluid interfaces are seen within the imaged paranasal sinuses. No significant acute findings are seen with regard to the imaged orbits or middle ear clefts. Degenerative changes involve the bilateral temporomandibular joints (TMJs) and the partially imaged cervical spine. There is evidence for dental caries. There are impacted posterior maxillary molars bilaterally.       No acute brain abnormality is appreciated. Please see above comments for further detail.    Portions of this note were completed with a voice recognition program.  5/3/2025 2:23 AM by Carlos Keller MD on Workstation: [employer]      XR Chest 1 View  Result Date: 5/3/2025  XR CHEST 1 VW-  Date of exam: 5/3/2025 1:01 AM.  Comparison: 3/17/2025.  INDICATIONS: 74-year-old female with dyspnea; headache (HA); generalized weakness.  FINDINGS: A single AP (or PA) semi-upright portable chest radiograph was performed. Borderline cardiac enlargement is suspected and is unchanged. There is a suspected small right pleural effusion. No definite left pleural effusion. There may be minimal atelectasis in the right lung base. Acute infiltrate is thought to be less likely. No acute infiltrate is seen on the left. External artifacts are noted. There may be a chronic bone  infarct involving the proximal right humeral shaft, as before. There are postoperative changes of the cervical spine. Degenerative changes involve the imaged spine and shoulders. Chronic calcified granulomatous disease may involve the chest. The patient has undergone cholecystectomy.       A small right pleural effusion is suspected. Interval resolution of the mild pulmonary edema with vascular congestion since 3/17/2025. There may be mild subsegmental atelectasis in the right lung base. Acute infiltrate is thought to be less likely. Please see above comments for further detail.    Portions of this note were completed with a voice recognition program.  5/3/2025 1:21 AM by Carlos Keller MD on Workstation: [email or web address]          Differential Diagnosis and Discussion:    Headache: Differential diagnosis includes but is not limited to migraine, cluster headache, hypertension, tumor, subarachnoid bleeding, pseudotumor cerebri, temporal arteritis, infections, tension headache, and TMJ syndrome.  Weakness: Based on the patient's history, signs, and symptoms, the diffential diagnosis includes but is not limited to meningitis, stroke, sepsis, subarachnoid hemorrhage, intracranial bleeding, encephalitis, acute uti, dehydration, MS, myasthenia gravis, Guillan Hamer, migraine variant, neuromuscular disorders vertigo, electrolyte imbalance, and metabolic disorders.    PROCEDURES:    Labs were collected in the emergency department and all labs were reviewed and interpreted by me.  X-ray were performed in the emergency department and all X-ray impressions were independently interpreted by me.  An EKG was performed and the EKG was interpreted by me.  CT scan was performed in the emergency department and the CT scan radiology impression was interpreted by me.    ECG 12 Lead Dyspnea   Preliminary Result   HEART RATE=91  bpm   RR Ricgvlpz=395  ms   MA Pymvybie=677  ms   P Horizontal Axis=61  deg   P Front Axis=69  deg   QRSD Interval=75  ms    QT Pwenvnno=242  ms   LIaH=689  ms   QRS Axis=53  deg   T Wave Axis=48  deg   - BORDERLINE ECG -   Sinus rhythm   Borderline prolonged DC interval   Borderline prolonged QT interval   Date and Time of Study:2025-05-03 01:09:59          Procedures    MDM     Amount and/or Complexity of Data Reviewed  Decide to obtain previous medical records or to obtain history from someone other than the patient: yes                       Patient Care Considerations:    SEPSIS was considered but is NOT present in the emergency department as SIRS criteria is not present.      Consultants/Shared Management Plan:    None    Social Determinants of Health:    Patient is independent, reliable, and has access to care.       Disposition and Care Coordination:    Discharged: I considered escalation of care by admitting this patient to the hospital, however the patient has no evidence of angioedema.  She has no hypoxia.  She has normal white blood cell count.  EKG is nonischemic.    I have explained the patient´s condition, diagnoses and treatment plan based on the information available to me at this time. I have answered questions and addressed any concerns. The patient has a good  understanding of the patient´s diagnosis, condition, and treatment plan as can be expected at this point. The vital signs have been stable. The patient´s condition is stable and appropriate for discharge from the emergency department.      The patient will pursue further outpatient evaluation with the primary care physician or other designated or consulting physician as outlined in the discharge instructions. They are agreeable to this plan of care and follow-up instructions have been explained in detail. The patient has received these instructions in written format and has expressed an understanding of the discharge instructions. The patient is aware that any significant change in condition or worsening of symptoms should prompt an immediate return to this or  the closest emergency department or call to 911.    Final diagnoses:   Medication reaction, initial encounter        ED Disposition       ED Disposition   Discharge    Condition   Stable    Comment   --               This medical record created using voice recognition software.             Jose Lomax MD  05/03/25 0256

## 2025-05-13 LAB
QT INTERVAL: 399 MS
QTC INTERVAL: 491 MS

## 2025-06-13 ENCOUNTER — APPOINTMENT (OUTPATIENT)
Dept: GENERAL RADIOLOGY | Facility: HOSPITAL | Age: 75
End: 2025-06-13
Payer: MEDICARE

## 2025-06-13 ENCOUNTER — APPOINTMENT (OUTPATIENT)
Dept: CT IMAGING | Facility: HOSPITAL | Age: 75
End: 2025-06-13
Payer: MEDICARE

## 2025-06-13 ENCOUNTER — HOSPITAL ENCOUNTER (EMERGENCY)
Facility: HOSPITAL | Age: 75
Discharge: HOME | End: 2025-06-14
Attending: EMERGENCY MEDICINE
Payer: MEDICARE

## 2025-06-13 DIAGNOSIS — R10.84 GENERALIZED ABDOMINAL PAIN: Primary | ICD-10-CM

## 2025-06-13 LAB
ACETONE BLD QL: NEGATIVE
ALBUMIN SERPL-MCNC: 4.4 G/DL (ref 3.5–5.2)
ALBUMIN/GLOB SERPL: 1 G/DL
ALP SERPL-CCNC: 156 U/L (ref 39–117)
ALT SERPL W P-5'-P-CCNC: 26 U/L (ref 1–33)
ANION GAP SERPL CALCULATED.3IONS-SCNC: 19.1 MMOL/L (ref 5–15)
AST SERPL-CCNC: 29 U/L (ref 1–32)
ATMOSPHERIC PRESS: 741.3 MMHG
BASE EXCESS BLDV CALC-SCNC: 0.4 MMOL/L (ref -2–2)
BASOPHILS # BLD AUTO: 0.06 10*3/MM3 (ref 0–0.2)
BASOPHILS NFR BLD AUTO: 0.5 % (ref 0–1.5)
BILIRUB SERPL-MCNC: 1.7 MG/DL (ref 0–1.2)
BUN SERPL-MCNC: 13.1 MG/DL (ref 8–23)
BUN/CREAT SERPL: 20.5 (ref 7–25)
CALCIUM SPEC-SCNC: 9.7 MG/DL (ref 8.6–10.5)
CHLORIDE SERPL-SCNC: 92 MMOL/L (ref 98–107)
CO2 SERPL-SCNC: 18.9 MMOL/L (ref 22–29)
CREAT SERPL-MCNC: 0.64 MG/DL (ref 0.57–1)
DEPRECATED RDW RBC AUTO: 43.8 FL (ref 37–54)
EGFRCR SERPLBLD CKD-EPI 2021: 93.4 ML/MIN/1.73
EOSINOPHIL # BLD AUTO: 0 10*3/MM3 (ref 0–0.4)
EOSINOPHIL NFR BLD AUTO: 0 % (ref 0.3–6.2)
ERYTHROCYTE [DISTWIDTH] IN BLOOD BY AUTOMATED COUNT: 14.3 % (ref 12.3–15.4)
GLOBULIN UR ELPH-MCNC: 4.3 GM/DL
GLUCOSE SERPL-MCNC: 332 MG/DL (ref 65–99)
HCO3 BLDV-SCNC: 23.3 MMOL/L (ref 22–26)
HCT VFR BLD AUTO: 46.5 % (ref 34–46.6)
HGB BLD-MCNC: 15.6 G/DL (ref 12–15.9)
HGB BLDA-MCNC: 17.3 G/DL (ref 12–18)
HOLD SPECIMEN: NORMAL
HOLD SPECIMEN: NORMAL
IMM GRANULOCYTES # BLD AUTO: 0.07 10*3/MM3 (ref 0–0.05)
IMM GRANULOCYTES NFR BLD AUTO: 0.6 % (ref 0–0.5)
LYMPHOCYTES # BLD AUTO: 2.08 10*3/MM3 (ref 0.7–3.1)
LYMPHOCYTES NFR BLD AUTO: 17.2 % (ref 19.6–45.3)
MAGNESIUM SERPL-MCNC: 1.7 MG/DL (ref 1.6–2.4)
MCH RBC QN AUTO: 28 PG (ref 26.6–33)
MCHC RBC AUTO-ENTMCNC: 33.5 G/DL (ref 31.5–35.7)
MCV RBC AUTO: 83.5 FL (ref 79–97)
MODALITY: ABNORMAL
MONOCYTES # BLD AUTO: 0.42 10*3/MM3 (ref 0.1–0.9)
MONOCYTES NFR BLD AUTO: 3.5 % (ref 5–12)
NEUTROPHILS NFR BLD AUTO: 78.2 % (ref 42.7–76)
NEUTROPHILS NFR BLD AUTO: 9.49 10*3/MM3 (ref 1.7–7)
NOTIFIED WHO: ABNORMAL
NRBC BLD AUTO-RTO: 0 /100 WBC (ref 0–0.2)
OSMOLALITY SERPL: 291 MOSM/KG (ref 280–301)
PCO2 BLDV: 32.5 MM HG (ref 41–51)
PH BLDV: 7.46 PH UNITS (ref 7.31–7.41)
PLATELET # BLD AUTO: 152 10*3/MM3 (ref 140–450)
PMV BLD AUTO: 12.2 FL (ref 6–12)
PO2 BLDV: 37.7 MM HG (ref 35–42)
POTASSIUM SERPL-SCNC: 4.2 MMOL/L (ref 3.5–5.2)
PROT SERPL-MCNC: 8.7 G/DL (ref 6–8.5)
QT INTERVAL: 380 MS
QTC INTERVAL: 464 MS
RBC # BLD AUTO: 5.57 10*6/MM3 (ref 3.77–5.28)
SAO2 % BLDCOV: 75.7 % (ref 45–75)
SODIUM SERPL-SCNC: 130 MMOL/L (ref 136–145)
TROPONIN T SERPL HS-MCNC: 20 NG/L
WBC NRBC COR # BLD AUTO: 12.12 10*3/MM3 (ref 3.4–10.8)
WHOLE BLOOD HOLD COAG: NORMAL
WHOLE BLOOD HOLD SPECIMEN: NORMAL

## 2025-06-13 PROCEDURE — 82009 KETONE BODYS QUAL: CPT

## 2025-06-13 PROCEDURE — 25010000002 KETOROLAC TROMETHAMINE PER 15 MG

## 2025-06-13 PROCEDURE — 87637 SARSCOV2&INF A&B&RSV AMP PRB: CPT

## 2025-06-13 PROCEDURE — 25810000003 SODIUM CHLORIDE 0.9 % SOLUTION

## 2025-06-13 PROCEDURE — 84484 ASSAY OF TROPONIN QUANT: CPT

## 2025-06-13 PROCEDURE — 96374 THER/PROPH/DIAG INJ IV PUSH: CPT

## 2025-06-13 PROCEDURE — 80053 COMPREHEN METABOLIC PANEL: CPT

## 2025-06-13 PROCEDURE — 83930 ASSAY OF BLOOD OSMOLALITY: CPT

## 2025-06-13 PROCEDURE — 85025 COMPLETE CBC W/AUTO DIFF WBC: CPT

## 2025-06-13 PROCEDURE — 82550 ASSAY OF CK (CPK): CPT

## 2025-06-13 PROCEDURE — 99285 EMERGENCY DEPT VISIT HI MDM: CPT

## 2025-06-13 PROCEDURE — 96361 HYDRATE IV INFUSION ADD-ON: CPT

## 2025-06-13 PROCEDURE — 74177 CT ABD & PELVIS W/CONTRAST: CPT

## 2025-06-13 PROCEDURE — 36415 COLL VENOUS BLD VENIPUNCTURE: CPT

## 2025-06-13 PROCEDURE — 93005 ELECTROCARDIOGRAM TRACING: CPT

## 2025-06-13 PROCEDURE — 71045 X-RAY EXAM CHEST 1 VIEW: CPT

## 2025-06-13 PROCEDURE — 83735 ASSAY OF MAGNESIUM: CPT

## 2025-06-13 PROCEDURE — 82803 BLOOD GASES ANY COMBINATION: CPT

## 2025-06-13 PROCEDURE — 93005 ELECTROCARDIOGRAM TRACING: CPT | Performed by: EMERGENCY MEDICINE

## 2025-06-13 PROCEDURE — 84484 ASSAY OF TROPONIN QUANT: CPT | Performed by: EMERGENCY MEDICINE

## 2025-06-13 RX ORDER — SODIUM CHLORIDE 0.9 % (FLUSH) 0.9 %
10 SYRINGE (ML) INJECTION AS NEEDED
Status: DISCONTINUED | OUTPATIENT
Start: 2025-06-13 | End: 2025-06-14 | Stop reason: HOSPADM

## 2025-06-13 RX ORDER — KETOROLAC TROMETHAMINE 15 MG/ML
15 INJECTION, SOLUTION INTRAMUSCULAR; INTRAVENOUS ONCE
Status: COMPLETED | OUTPATIENT
Start: 2025-06-13 | End: 2025-06-13

## 2025-06-13 RX ADMIN — KETOROLAC TROMETHAMINE 15 MG: 15 INJECTION, SOLUTION INTRAMUSCULAR; INTRAVENOUS at 23:15

## 2025-06-13 RX ADMIN — SODIUM CHLORIDE 1000 ML: 9 INJECTION, SOLUTION INTRAVENOUS at 23:16

## 2025-06-14 VITALS
HEIGHT: 61 IN | OXYGEN SATURATION: 96 % | RESPIRATION RATE: 24 BRPM | SYSTOLIC BLOOD PRESSURE: 157 MMHG | TEMPERATURE: 98.1 F | DIASTOLIC BLOOD PRESSURE: 88 MMHG | WEIGHT: 181.66 LBS | BODY MASS INDEX: 34.3 KG/M2 | HEART RATE: 102 BPM

## 2025-06-14 LAB
BACTERIA UR QL AUTO: ABNORMAL /HPF
BILIRUB UR QL STRIP: NEGATIVE
CK SERPL-CCNC: 132 U/L (ref 20–180)
CLARITY UR: CLEAR
COLOR UR: YELLOW
FLUAV RNA RESP QL NAA+PROBE: NOT DETECTED
FLUBV RNA RESP QL NAA+PROBE: NOT DETECTED
GEN 5 1HR TROPONIN T REFLEX: 19 NG/L
GLUCOSE UR STRIP-MCNC: ABNORMAL MG/DL
HGB UR QL STRIP.AUTO: ABNORMAL
HYALINE CASTS UR QL AUTO: ABNORMAL /LPF
KETONES UR QL STRIP: ABNORMAL
LEUKOCYTE ESTERASE UR QL STRIP.AUTO: NEGATIVE
NITRITE UR QL STRIP: NEGATIVE
PH UR STRIP.AUTO: 7.5 [PH] (ref 5–8)
PROT UR QL STRIP: ABNORMAL
RBC # UR STRIP: ABNORMAL /HPF
REF LAB TEST METHOD: ABNORMAL
RSV RNA RESP QL NAA+PROBE: NOT DETECTED
SARS-COV-2 RNA RESP QL NAA+PROBE: NOT DETECTED
SP GR UR STRIP: >1.03 (ref 1–1.03)
SQUAMOUS #/AREA URNS HPF: ABNORMAL /HPF
TROPONIN T % DELTA: -5
TROPONIN T NUMERIC DELTA: -1 NG/L
UROBILINOGEN UR QL STRIP: ABNORMAL
WBC # UR STRIP: ABNORMAL /HPF

## 2025-06-14 PROCEDURE — 81001 URINALYSIS AUTO W/SCOPE: CPT | Performed by: EMERGENCY MEDICINE

## 2025-06-14 PROCEDURE — 25010000002 MORPHINE PER 10 MG: Performed by: EMERGENCY MEDICINE

## 2025-06-14 PROCEDURE — 25510000001 IOPAMIDOL PER 1 ML: Performed by: EMERGENCY MEDICINE

## 2025-06-14 PROCEDURE — 96375 TX/PRO/DX INJ NEW DRUG ADDON: CPT

## 2025-06-14 RX ORDER — MORPHINE SULFATE 2 MG/ML
2 INJECTION, SOLUTION INTRAMUSCULAR; INTRAVENOUS ONCE
Status: COMPLETED | OUTPATIENT
Start: 2025-06-14 | End: 2025-06-14

## 2025-06-14 RX ORDER — IOPAMIDOL 755 MG/ML
100 INJECTION, SOLUTION INTRAVASCULAR
Status: COMPLETED | OUTPATIENT
Start: 2025-06-14 | End: 2025-06-14

## 2025-06-14 RX ORDER — HYDROCODONE BITARTRATE AND ACETAMINOPHEN 5; 325 MG/1; MG/1
2 TABLET ORAL ONCE
Refills: 0 | Status: COMPLETED | OUTPATIENT
Start: 2025-06-14 | End: 2025-06-14

## 2025-06-14 RX ADMIN — MORPHINE SULFATE 2 MG: 2 INJECTION, SOLUTION INTRAMUSCULAR; INTRAVENOUS at 00:48

## 2025-06-14 RX ADMIN — IOPAMIDOL 100 ML: 755 INJECTION, SOLUTION INTRAVENOUS at 00:02

## 2025-06-14 RX ADMIN — HYDROCODONE BITARTRATE AND ACETAMINOPHEN 2 TABLET: 5; 325 TABLET ORAL at 03:57

## 2025-06-14 NOTE — ED PROVIDER NOTES
"SHARED VISIT ATTESTATION:    This visit was performed by myself and an APC.  I personally approved the management plan/medical decision making and take responsibility for the patient management.      SHARED VISIT NOTE:    Patient is 74 y.o. year old female that presents to the ED for evaluation of bodyaches.     Physical Exam    ED Course:    /59 (BP Location: Left arm, Patient Position: Lying)   Pulse 87   Temp 98.1 °F (36.7 °C) (Oral)   Resp 15   Ht 154.9 cm (60.98\")   Wt 82.4 kg (181 lb 10.5 oz)   SpO2 99%   BMI 34.34 kg/m²       The following orders were placed and all results were independently analyzed by me:  Orders Placed This Encounter   Procedures   • COVID-19, FLU A/B, RSV PCR 1 HR TAT - Swab, Nasopharynx   • XR Chest 1 View   • CT Abdomen Pelvis With Contrast   • [place] Draw   • Comprehensive Metabolic Panel   • High Sensitivity Troponin T   • Magnesium   • Urinalysis With Microscopic If Indicated (No Culture) - Urine, Clean Catch   • CBC Auto Differential   • High Sensitivity Troponin T 1Hr   • Acetone   • Osmolality, Serum   • Blood Gas, Venous -   • NPO Diet NPO Type: Strict NPO   • Undress & Gown   • Continuous Pulse Oximetry   • Vital Signs   • Orthostatic Blood Pressure   • Straight cath   • Oxygen Therapy- Nasal Cannula; Titrate 1-6 LPM Per SpO2; 90 - 95%   • POC Glucose Once   • ECG 12 Lead Altered Mental Status   • Insert Peripheral IV   • Fall Precautions   • CBC & Differential   • Green Top (Gel)   • Lavender Top   • Gold Top - SST   • Light Blue Top       Medications Given in the Emergency Department:  Medications   sodium chloride 0.9 % flush 10 mL (has no administration in time range)   sodium chloride 0.9 % bolus 1,000 mL (1,000 mL Intravenous New Bag 6/13/25 9536)   ketorolac (TORADOL) injection 15 mg (15 mg Intravenous Given 6/13/25 2315)   iopamidol (ISOVUE-370) 76 % injection 100 mL (100 mL Intravenous Given 6/14/25 0002)        ED Course:    ED Course as of 06/14/25 0746 "   Sat Jun 14, 2025   0107 CT Abdomen Pelvis With Contrast  IMPRESSION:  Age-indeterminate (but probably acute-to-subacute) bilateral  pyelonephritis is suggested without a definite intrarenal abscess. No  perinephric fluid collection is seen. No definite ureteritis is  appreciated. No obstructing ureteral calculus is identified. Otherwise,  no significant acute findings are seen. Please see above comments for  further detail. [MV]   0146 CT Abdomen Pelvis With Contrast  Was going to give the patient rocephin for possible pyelo however, urine is negative for any bacteria. [MV]   0146 On re-eval, patient states that pain is better after getting morphine. I did review the patient's DAJUAN with Dr. Jiménez. Patient is prescribed Gabapentin, Clonazepam, and Norco. This was last refilled on 5/9/25. Patient will need to follow up with Dr. Pascal in the next few days for possible refill.  [MV]      ED Course User Index  [MV] Carlos Gregory PA       Labs:    Lab Results (last 24 hours)       Procedure Component Value Units Date/Time    CBC & Differential [[id number]]  (Abnormal) Collected: 06/13/25 2047    Specimen: Blood Updated: 06/13/25 2050    Narrative:      The following orders were created for panel order CBC & Differential.  Procedure                               Abnormality         Status                     ---------                               -----------         ------                     CBC Auto Differential[[id number]]        Abnormal            Final result                 Please view results for these tests on the individual orders.    Comprehensive Metabolic Panel [[id number]]  (Abnormal) Collected: 06/13/25 2047    Specimen: Blood Updated: 06/13/25 2129     Glucose 332 mg/dL      BUN 13.1 mg/dL      Creatinine 0.64 mg/dL      Sodium 130 mmol/L      Potassium 4.2 mmol/L      Chloride 92 mmol/L      CO2 18.9 mmol/L      Calcium 9.7 mg/dL      Total Protein 8.7 g/dL      Albumin 4.4 g/dL      ALT (SGPT) 26 U/L       AST (SGOT) 29 U/L      Alkaline Phosphatase 156 U/L      Total Bilirubin 1.7 mg/dL      Globulin 4.3 gm/dL      A/G Ratio 1.0 g/dL      BUN/Creatinine Ratio 20.5     Anion Gap 19.1 mmol/L      eGFR 93.4 mL/min/1.73     Narrative:      GFR Categories in Chronic Kidney Disease (CKD)              GFR Category          GFR (mL/min/1.73)    Interpretation  G1                    90 or greater        Normal or high (1)  G2                    60-89                Mild decrease (1)  G3a                   45-59                Mild to moderate decrease  G3b                   30-44                Moderate to severe decrease  G4                    15-29                Severe decrease  G5                    14 or less           Kidney failure    (1)In the absence of evidence of kidney disease, neither GFR category G1 or G2 fulfill the criteria for CKD.    eGFR calculation 2021 CKD-EPI creatinine equation, which does not include race as a factor    High Sensitivity Troponin T [[id number]]  (Abnormal) Collected: 06/13/25 2047    Specimen: Blood Updated: 06/13/25 2129     HS Troponin T 20 ng/L     Narrative:      High Sensitive Troponin T Reference Range:  <14.0 ng/L- Negative Female for AMI  <22.0 ng/L- Negative Male for AMI  >=14 - Abnormal Female indicating possible myocardial injury.  >=22 - Abnormal Male indicating possible myocardial injury.   Clinicians would have to utilize clinical acumen, EKG, Troponin, and serial changes to determine if it is an Acute Myocardial Infarction or myocardial injury due to an underlying chronic condition.         Magnesium [[id number]]  (Normal) Collected: 06/13/25 2047    Specimen: Blood Updated: 06/13/25 2129     Magnesium 1.7 mg/dL     CBC Auto Differential [[id number]]  (Abnormal) Collected: 06/13/25 2047    Specimen: Blood Updated: 06/13/25 2050     WBC 12.12 10*3/mm3      RBC 5.57 10*6/mm3      Hemoglobin 15.6 g/dL      Hematocrit 46.5 %      MCV 83.5 fL      MCH 28.0 pg      MCHC 33.5 g/dL       RDW 14.3 %      RDW-SD 43.8 fl      MPV 12.2 fL      Platelets 152 10*3/mm3      Neutrophil % 78.2 %      Lymphocyte % 17.2 %      Monocyte % 3.5 %      Eosinophil % 0.0 %      Basophil % 0.5 %      Immature Grans % 0.6 %      Neutrophils, Absolute 9.49 10*3/mm3      Lymphocytes, Absolute 2.08 10*3/mm3      Monocytes, Absolute 0.42 10*3/mm3      Eosinophils, Absolute 0.00 10*3/mm3      Basophils, Absolute 0.06 10*3/mm3      Immature Grans, Absolute 0.07 10*3/mm3      nRBC 0.0 /100 WBC     Acetone [[id number]]  (Normal) Collected: 06/13/25 2047    Specimen: Blood Updated: 06/13/25 2255     Acetone Negative    Osmolality, Serum [[id number]]  (Normal) Collected: 06/13/25 2047    Specimen: Blood Updated: 06/13/25 2336     Osmolality 291 mOsm/kg     COVID-19, FLU A/B, RSV PCR 1 HR TAT - Swab, Nasopharynx [[id number]] Collected: 06/13/25 2252    Specimen: Swab from Nasopharynx Updated: 06/13/25 2258    Blood Gas, Venous - [[id number]]  (Abnormal) Collected: 06/13/25 2314    Specimen: Venous Blood Updated: 06/13/25 2317     pH, Venous 7.464 pH Units      pCO2, Venous 32.5 mm Hg      pO2, Venous 37.7 mm Hg      HCO3, Venous 23.3 mmol/L      Base Excess, Venous 0.4 mmol/L      Comment: Serial Number: [id number]:  [id number]        O2 Saturation, Venous 75.7 %      Hemoglobin, Blood Gas 17.3 g/dL      Barometric Pressure for Blood Gas 741.3000 mmHg      Modality Room Air     Notified Who NA    High Sensitivity Troponin T 1Hr [[id number]] Collected: 06/13/25 2315    Specimen: Blood Updated: 06/13/25 2320             Imaging:    XR Chest 1 View  Result Date: 6/13/2025  XR CHEST 1 VW Date of Exam: 6/13/2025 8:44 PM EDT Indication: Weak/Dizzy/AMS triage protocol Comparison: CXR 5/3/2025 Findings: The heart is normal in size. The lungs are well-expanded and free of infiltrates. Bony structures appear intact. Plate and screws in the lower cervical spine are consistent with anterior fusion.     Impression: No active cardiopulmonary  disease is seen. Electronically Signed: Cordell Perea MD  6/13/2025 8:57 PM EDT  Workstation ID: [id number]      MDM:    Kee Jiménez MD  00:08 EDT  06/14/25         Barrie Jiménez MD  06/14/25 0746

## 2025-06-14 NOTE — DISCHARGE INSTRUCTIONS
Your exam and symptoms today are consistent with non-specific abdominal pain. You may need to follow up with Dr. Pascal to possibly get refills on your medications which was last refilled on 5/9/25.    Continue to monitor your symptoms at home. Your sugar was slightly elevated today.    Return to the Emergency Department if you develop any uncontrollable fever, intractable pain, nausea, vomiting.

## 2025-06-14 NOTE — ED PROVIDER NOTES
Time: 1:50 AM EDT  Date of encounter:  6/13/2025  Independent Historian/Clinical History and Information was obtained by:   Patient    History is limited by: N/A    Chief Complaint: Pain      History of Present Illness:  Patient is a 74 y.o. year old female who presents to the emergency department for evaluation of generalized bodyaches.  Patient came in via EMS.  Per report, patient states that she does not feel good and is hurting everywhere.  States that she cannot pinpoint where exactly.  Denies any nausea or vomiting.  History of hypertension, diabetes, hyperlipidemia, PTSD.   is in the room. Initially, patient was thought to be altered but she is Aox4. She is answering questions appropriately.    Patient Care Team  Primary Care Provider: Javon Pascal MD    Past Medical History:     No Known Allergies  Past Medical History:   Diagnosis Date    Anesthesia     ptsd/pt has nightmares, but states no issues while waking  up  from  anesthesia    Arthritis     Cervical spinal stenosis 04/03/2019    Brisk reflexes suggestive of myelopathy/h/o cervical fusion    Cervicalgia     Diverticulitis of colon     Fatty liver     Foot pain     Fusion of spine of cervical region 06/19/2019    GERD (gastroesophageal reflux disease)     Hyperlipidemia     recent cardiology visit for surg clearance. no issues noted, no cp or soa    Hypertension     Leg pain     Psoriasis     PTSD (post-traumatic stress disorder)     Type 2 diabetes mellitus      Past Surgical History:   Procedure Laterality Date    BREAST BIOPSY      CERVICAL FUSION  03/14/2019    right C4-5,C5-6    CHOLECYSTECTOMY      COLONOSCOPY      COLONOSCOPY N/A 8/3/2023    Procedure: COLONOSCOPY WITH POLYPECTOMIES;  Surgeon: Neal Delaney MD;  Location: formerly [facility] ENDOSCOPY;  Service: Gastroenterology;  Laterality: N/A;  COLON POLYPS, DIVERTICULOSIS    ENDOSCOPY N/A 8/3/2023    Procedure: ESOPHAGOGASTRODUODENOSCOPY WITH BIOPSIES;  Surgeon: Neal Delaney,  MD;  Location: [facility] ENDOSCOPY;  Service: Gastroenterology;  Laterality: N/A;  HIATAL HERNIA, REFLUX ESOPHAGITIS    HEMORRHOIDECTOMY      KNEE SURGERY      TOTAL KNEE ARTHROPLASTY Left 7/29/2024    Procedure: LEFT TOTAL KNEE ARTHROPLASTY;  Surgeon: Rajeev Yañez MD;  Location: [facility] MAIN OR;  Service: Orthopedics;  Laterality: Left;    TUBAL ABDOMINAL LIGATION       Family History   Problem Relation Age of Onset    Diabetes Mother         Unspecified    Breast cancer Sister     Brain cancer Sister     Colon cancer Sister 62    Heart disease Sister     Heart disease Brother     Diabetes Brother         Unspecified       Home Medications:  Prior to Admission medications    Medication Sig Start Date End Date Taking? Authorizing Provider   albuterol sulfate  (90 Base) MCG/ACT inhaler Inhale 2 puffs Every 4 (Four) Hours As Needed for Wheezing. 3/19/25   Peyton Cunningham APRN   amLODIPine (NORVASC) 10 MG tablet Take 1 tablet by mouth Daily.    Gia Smith MD   aspirin 325 MG EC tablet Take 1 tablet by mouth Daily.    Gia Smith MD   atorvastatin (LIPITOR) 10 MG tablet Take 1 tablet by mouth Daily. 8/17/23   Kelsey Hoyos APRN   benazepril (LOTENSIN) 40 MG tablet TAKE 1 TABLET EVERY DAY 1/22/24   Kelsey Hoyos APRN   clonazePAM (KlonoPIN) 0.5 MG tablet Take 1 tablet by mouth Every Morning. 5/7/24   Gia Smith MD   clonazePAM (KlonoPIN) 0.5 MG tablet Take 1 tablet by mouth Daily With Lunch.    Gia Smith MD   clonazePAM (KlonoPIN) 0.5 MG tablet Take 2 tablets by mouth Every Night.    Gia Smith MD   Dextromethorphan-buPROPion ER (Auvelity)  MG tablet controlled-release Take 2 tablets by mouth Every Morning.  Patient not taking: Reported on 3/17/2025    Gia Smith MD   DULoxetine (CYMBALTA) 60 MG capsule Take 1 capsule by mouth Daily. 12/28/22   Gia Smith MD   gabapentin (NEURONTIN) 800 MG tablet Take 1 tablet by  mouth 3 (Three) Times a Day As Needed. 5/12/24   Gia Smith MD   glipizide (GLUCOTROL) 10 MG tablet Take 1 tablet by mouth 2 (Two) Times a Day Before Meals. 1/5/23   Kelsey Hoyos APRN   HumuLIN N KwikPen 100 UNIT/ML injection Inject 15 Units under the skin into the appropriate area as directed every night at bedtime. 6/17/24   Gia Smith MD   HYDROcodone-acetaminophen (NORCO)  MG per tablet Take 1 tablet by mouth Every 8 (Eight) Hours As Needed for Moderate Pain or Severe Pain. 8/12/24   Gia Smith MD   insulin degludec (Tresiba FlexTouch) 100 UNIT/ML solution pen-injector injection Inject 20 Units under the skin into the appropriate area as directed Every Morning.    Gia Smith MD   metoprolol succinate XL (TOPROL-XL) 25 MG 24 hr tablet Take 1 tablet by mouth Daily. 7/24/23   Roly Torres MD   nystatin (MYCOSTATIN) 201463 UNIT/GM cream Apply 1 Application topically to the appropriate area as directed 2 (Two) Times a Day As Needed (rash). 8/29/24   Gia Smith MD   omeprazole (priLOSEC) 40 MG capsule Take 1 capsule by mouth Daily. 3/19/25   Peyton Cunningham APRN   tiZANidine (ZANAFLEX) 4 MG tablet Take 1 tablet by mouth Every Night. 12/28/22   Gia Smith MD        Social History:   Social History     Tobacco Use    Smoking status: Never    Smokeless tobacco: Never   Vaping Use    Vaping status: Never Used   Substance Use Topics    Alcohol use: Never    Drug use: No         Review of Systems:  Review of Systems   Constitutional:  Negative for chills and fever.   HENT:  Negative for ear pain.    Eyes:  Negative for pain.   Respiratory:  Negative for cough and shortness of breath.    Cardiovascular:  Negative for chest pain.   Gastrointestinal:  Negative for abdominal pain, diarrhea, nausea and vomiting.   Genitourinary:  Negative for dysuria.   Musculoskeletal:  Positive for arthralgias and myalgias.   Skin:  Negative for rash.  "  Neurological:  Negative for headaches.        Physical Exam:  /59 (BP Location: Left arm, Patient Position: Lying)   Pulse 87   Temp 98.1 °F (36.7 °C) (Oral)   Resp 15   Ht 154.9 cm (60.98\")   Wt 82.4 kg (181 lb 10.5 oz)   SpO2 99%   BMI 34.34 kg/m²     Physical Exam  Vitals and nursing note reviewed.   Constitutional:       Appearance: Normal appearance.   HENT:      Head: Normocephalic and atraumatic.      Nose: Nose normal.   Eyes:      Extraocular Movements: Extraocular movements intact.      Conjunctiva/sclera: Conjunctivae normal.      Pupils: Pupils are equal, round, and reactive to light.   Cardiovascular:      Rate and Rhythm: Normal rate and regular rhythm.      Heart sounds: Normal heart sounds.   Pulmonary:      Effort: Pulmonary effort is normal.      Breath sounds: Normal breath sounds.   Abdominal:      General: Abdomen is flat.      Palpations: Abdomen is soft.      Tenderness: There is generalized abdominal tenderness.   Musculoskeletal:         General: Normal range of motion.      Cervical back: Normal range of motion and neck supple.   Skin:     General: Skin is warm and dry.   Neurological:      General: No focal deficit present.      Mental Status: She is alert and oriented to person, place, and time.   Psychiatric:         Mood and Affect: Mood normal.         Behavior: Behavior normal.         Thought Content: Thought content normal.         Judgment: Judgment normal.                   Medical Decision Making:      Comorbidities that affect care:    Hypertension, diabetes, hyperlipidemia, PTSD, chronic pain syndrome    External Notes reviewed:    Previous ED Note: Reviewed ED note on 5/2/2025      The following orders were placed and all results were independently analyzed by me:  Orders Placed This Encounter   Procedures    COVID-19, FLU A/B, RSV PCR 1 HR TAT - Swab, Nasopharynx    XR Chest 1 View    CT Abdomen Pelvis With Contrast    [place] Draw    Comprehensive Metabolic " Panel    High Sensitivity Troponin T    Magnesium    Urinalysis With Microscopic If Indicated (No Culture) - Urine, Clean Catch    CBC Auto Differential    High Sensitivity Troponin T 1Hr    Acetone    Osmolality, Serum    Blood Gas, Venous -    CK    Urinalysis, Microscopic Only - Urine, Clean Catch    NPO Diet NPO Type: Strict NPO    Undress & Gown    Continuous Pulse Oximetry    Vital Signs    Orthostatic Blood Pressure    Straight cath    Oxygen Therapy- Nasal Cannula; Titrate 1-6 LPM Per SpO2; 90 - 95%    POC Glucose Once    ECG 12 Lead Altered Mental Status    Insert Peripheral IV    Fall Precautions    CBC & Differential    Green Top (Gel)    Lavender Top    Gold Top - SST    Light Blue Top       Medications Given in the Emergency Department:  Medications   sodium chloride 0.9 % flush 10 mL (has no administration in time range)   ketorolac (TORADOL) injection 15 mg (15 mg Intravenous Given 6/13/25 2315)   sodium chloride 0.9 % bolus 1,000 mL (0 mL Intravenous Stopped 6/14/25 0051)   iopamidol (ISOVUE-370) 76 % injection 100 mL (100 mL Intravenous Given 6/14/25 0002)   morphine injection 2 mg (2 mg Intravenous Given 6/14/25 0048)        ED Course:    ED Course as of 06/14/25 0200   Sat Jun 14, 2025   0107 CT Abdomen Pelvis With Contrast  IMPRESSION:  Age-indeterminate (but probably acute-to-subacute) bilateral  pyelonephritis is suggested without a definite intrarenal abscess. No  perinephric fluid collection is seen. No definite ureteritis is  appreciated. No obstructing ureteral calculus is identified. Otherwise,  no significant acute findings are seen. Please see above comments for  further detail. [MV]   0146 CT Abdomen Pelvis With Contrast  Was going to give the patient rocephin for possible pyelo however, urine is negative for any bacteria. [MV]   0146 On re-eval, patient states that pain is better after getting morphine. I did review the patient's DAJUAN with Dr. Jiménez. Patient is prescribed Gabapentin,  Clonazepam, and Norco. This was last refilled on 5/9/25. Patient will need to follow up with Dr. Pascal in the next few days for possible refill.  [MV]      ED Course User Index  [MV] Carlos Gregory PA       Labs:    Lab Results (last 24 hours)       Procedure Component Value Units Date/Time    CBC & Differential [[id number]]  (Abnormal) Collected: 06/13/25 2047    Specimen: Blood Updated: 06/13/25 2050    Narrative:      The following orders were created for panel order CBC & Differential.  Procedure                               Abnormality         Status                     ---------                               -----------         ------                     CBC Auto Differential[[id number]]        Abnormal            Final result                 Please view results for these tests on the individual orders.    Comprehensive Metabolic Panel [[id number]]  (Abnormal) Collected: 06/13/25 2047    Specimen: Blood Updated: 06/13/25 2129     Glucose 332 mg/dL      BUN 13.1 mg/dL      Creatinine 0.64 mg/dL      Sodium 130 mmol/L      Potassium 4.2 mmol/L      Chloride 92 mmol/L      CO2 18.9 mmol/L      Calcium 9.7 mg/dL      Total Protein 8.7 g/dL      Albumin 4.4 g/dL      ALT (SGPT) 26 U/L      AST (SGOT) 29 U/L      Alkaline Phosphatase 156 U/L      Total Bilirubin 1.7 mg/dL      Globulin 4.3 gm/dL      A/G Ratio 1.0 g/dL      BUN/Creatinine Ratio 20.5     Anion Gap 19.1 mmol/L      eGFR 93.4 mL/min/1.73     Narrative:      GFR Categories in Chronic Kidney Disease (CKD)              GFR Category          GFR (mL/min/1.73)    Interpretation  G1                    90 or greater        Normal or high (1)  G2                    60-89                Mild decrease (1)  G3a                   45-59                Mild to moderate decrease  G3b                   30-44                Moderate to severe decrease  G4                    15-29                Severe decrease  G5                    14 or less           Kidney  failure    (1)In the absence of evidence of kidney disease, neither GFR category G1 or G2 fulfill the criteria for CKD.    eGFR calculation 2021 CKD-EPI creatinine equation, which does not include race as a factor    High Sensitivity Troponin T [[id number]]  (Abnormal) Collected: 06/13/25 2047    Specimen: Blood Updated: 06/13/25 2129     HS Troponin T 20 ng/L     Narrative:      High Sensitive Troponin T Reference Range:  <14.0 ng/L- Negative Female for AMI  <22.0 ng/L- Negative Male for AMI  >=14 - Abnormal Female indicating possible myocardial injury.  >=22 - Abnormal Male indicating possible myocardial injury.   Clinicians would have to utilize clinical acumen, EKG, Troponin, and serial changes to determine if it is an Acute Myocardial Infarction or myocardial injury due to an underlying chronic condition.         Magnesium [[id number]]  (Normal) Collected: 06/13/25 2047    Specimen: Blood Updated: 06/13/25 2129     Magnesium 1.7 mg/dL     CBC Auto Differential [[id number]]  (Abnormal) Collected: 06/13/25 2047    Specimen: Blood Updated: 06/13/25 2050     WBC 12.12 10*3/mm3      RBC 5.57 10*6/mm3      Hemoglobin 15.6 g/dL      Hematocrit 46.5 %      MCV 83.5 fL      MCH 28.0 pg      MCHC 33.5 g/dL      RDW 14.3 %      RDW-SD 43.8 fl      MPV 12.2 fL      Platelets 152 10*3/mm3      Neutrophil % 78.2 %      Lymphocyte % 17.2 %      Monocyte % 3.5 %      Eosinophil % 0.0 %      Basophil % 0.5 %      Immature Grans % 0.6 %      Neutrophils, Absolute 9.49 10*3/mm3      Lymphocytes, Absolute 2.08 10*3/mm3      Monocytes, Absolute 0.42 10*3/mm3      Eosinophils, Absolute 0.00 10*3/mm3      Basophils, Absolute 0.06 10*3/mm3      Immature Grans, Absolute 0.07 10*3/mm3      nRBC 0.0 /100 WBC     Acetone [[id number]]  (Normal) Collected: 06/13/25 2047    Specimen: Blood Updated: 06/13/25 2255     Acetone Negative    Osmolality, Serum [[id number]]  (Normal) Collected: 06/13/25 2047    Specimen: Blood Updated: 06/13/25 8743      Osmolality 291 mOsm/kg     COVID-19, FLU A/B, RSV PCR 1 HR TAT - Swab, Nasopharynx [[id number]]  (Normal) Collected: 06/13/25 2252    Specimen: Swab from Nasopharynx Updated: 06/14/25 0011     COVID19 Not Detected     Influenza A PCR Not Detected     Influenza B PCR Not Detected     RSV, PCR Not Detected    Narrative:      Fact sheet for providers: https://www.fda.gov/media/052802/download    Fact sheet for patients: https://www.fda.gov/media/528570/download    Test performed by PCR.    Blood Gas, Venous - [[id number]]  (Abnormal) Collected: 06/13/25 2314    Specimen: Venous Blood Updated: 06/13/25 2317     pH, Venous 7.464 pH Units      pCO2, Venous 32.5 mm Hg      pO2, Venous 37.7 mm Hg      HCO3, Venous 23.3 mmol/L      Base Excess, Venous 0.4 mmol/L      Comment: Serial Number: [id number]:  [id number]        O2 Saturation, Venous 75.7 %      Hemoglobin, Blood Gas 17.3 g/dL      Barometric Pressure for Blood Gas 741.3000 mmHg      Modality Room Air     Notified Who NA    High Sensitivity Troponin T 1Hr [[id number]]  (Abnormal) Collected: 06/13/25 2315    Specimen: Blood Updated: 06/14/25 0113     HS Troponin T 19 ng/L      Troponin T Numeric Delta -1 ng/L      Troponin T % Delta -5    Narrative:      High Sensitive Troponin T Reference Range:  <14.0 ng/L- Negative Female for AMI  <22.0 ng/L- Negative Male for AMI  >=14 - Abnormal Female indicating possible myocardial injury.  >=22 - Abnormal Male indicating possible myocardial injury.   Clinicians would have to utilize clinical acumen, EKG, Troponin, and serial changes to determine if it is an Acute Myocardial Infarction or myocardial injury due to an underlying chronic condition.         CK [[id number]]  (Normal) Collected: 06/13/25 2315    Specimen: Blood Updated: 06/14/25 0104     Creatine Kinase 132 U/L     Urinalysis With Microscopic If Indicated (No Culture) - Urine, Clean Catch [[id number]]  (Abnormal) Collected: 06/14/25 0113    Specimen: Urine, Clean Catch  Updated: 06/14/25 0127     Color, UA Yellow     Appearance, UA Clear     pH, UA 7.5     Specific Gravity, UA >1.030     Glucose, UA >=1000 mg/dL (3+)     Ketones, UA 15 mg/dL (1+)     Bilirubin, UA Negative     Blood, UA Trace     Protein, UA Trace     Leuk Esterase, UA Negative     Nitrite, UA Negative     Urobilinogen, UA 1.0 E.U./dL    Urinalysis, Microscopic Only - Urine, Clean Catch [[id number]]  (Abnormal) Collected: 06/14/25 0113    Specimen: Urine, Clean Catch Updated: 06/14/25 0130     RBC, UA 3-5 /HPF      WBC, UA 0-2 /HPF      Bacteria, UA None Seen /HPF      Squamous Epithelial Cells, UA 0-2 /HPF      Hyaline Casts, UA None Seen /LPF      Methodology Automated Microscopy             Imaging:    CT Abdomen Pelvis With Contrast  Result Date: 6/14/2025  CT ABDOMEN PELVIS W CONTRAST-  Date of exam: 6/14/2025 12:02 AM.  Comparisons: 3/16/2021; 1/21/2021; 12/3/2020.  INDICATIONS: 74-year-old female w/ h/o generalized abdominal pain (1 day) w/ vomiting; + leukocytosis.  TECHNIQUE: Axial CT images were obtained of the abdomen and pelvis following the uneventful intravenous administration of 100 mL (or less) of Isovue-370 contrast agent. Reconstructed 2D (two-dimensional) coronal and sagittal images were also obtained. Automated exposure control and iterative construction methods were used. Additionally, the radiation dose reduction device was turned on for each scan per the ALARA (As Low as Reasonably Achievable) protocol. No oral contrast agent was administered for the study. Please see the electronic medical record, EMR (i.e., Epic), for the documented dose of intravenous contrast agent as well as the radiation dose. Please note that the pelvis was not included in the initial acquisition. Therefore, a second (2nd) acquisition was made, which included the pelvis.  FINDINGS: There is cirrhotic morphology of the liver. No suspicious hepatic lesion is appreciated. No hepatosplenomegaly is suggested. Chronic  calcified granulomatous disease involves the spleen. There is a small hiatal hernia. The patient has undergone cholecystectomy with mild compensatory dilatation of the biliary tree. Minimal nonspecific nodularity involves the adrenal glands. There be chronic pancreatitis changes with punctate mineralization involving the pancreatic neck, as seen on image 57 of series 6 and adjacent images, as seen previously. Renal cysts are present. There is mild prominence of the bilateral extrarenal pelves. Bilateral age-indeterminate, but probably acute-to-subacute, pyelonephritis is suggested. No definite ureteritis. No ureterolithiasis or nephrolithiasis. No suspicious renal lesion is suggested. Atherosclerotic changes of the aortoiliac arterial system without aneurysmal dilatation. Grossly, the mesenteric arteries are patent. No acute intraperitoneal or retroperitoneal hemorrhage. No significant ascites. There are colonic diverticula without acute diverticulitis. No acute appendicitis. No mechanical bowel obstruction. No definite bowel wall thickening. There is a moderate stool burden. No suspicious uterine or adnexal mass. No urinary bladder wall thickening or urinary bladder calculi. No acute infiltrate is seen in the partially imaged lung bases. There may be mild motion artifact on the study. Degenerative changes are seen throughout the imaged spine. There may be diffuse idiopathic skeletal hyperostosis (DISH). Degenerative changes involve the hip joints and the bilateral sacroiliac (SI) joints. No acute fracture. No aggressive osseous lesion is suggested.       Age-indeterminate (but probably acute-to-subacute) bilateral pyelonephritis is suggested without a definite intrarenal abscess. No perinephric fluid collection is seen. No definite ureteritis is appreciated. No obstructing ureteral calculus is identified. Otherwise, no significant acute findings are seen. Please see above comments for further detail.    Portions of  this note were completed with a voice recognition program.  6/14/2025 1:04 AM by Carlos Keller MD on Workstation: [id number]      XR Chest 1 View  Result Date: 6/13/2025  XR CHEST 1 VW Date of Exam: 6/13/2025 8:44 PM EDT Indication: Weak/Dizzy/AMS triage protocol Comparison: CXR 5/3/2025 Findings: The heart is normal in size. The lungs are well-expanded and free of infiltrates. Bony structures appear intact. Plate and screws in the lower cervical spine are consistent with anterior fusion.     Impression: No active cardiopulmonary disease is seen. Electronically Signed: Cordell Perea MD  6/13/2025 8:57 PM EDT  Workstation ID: [id number]        Differential Diagnosis and Discussion:    Abdominal Pain: Based on the patient's signs and symptoms, I considered abdominal aortic aneurysm, small bowel obstruction, pancreatitis, acute cholecystitis, acute appendecitis, peptic ulcer disease, gastritis, colitis, endocrine disorders, irritable bowel syndrome and other differential diagnosis an etiology of the patient's abdominal pain.    PROCEDURES:    Labs were collected in the emergency department and all labs were reviewed and interpreted by me.  X-ray were performed in the emergency department and all X-ray impressions were independently interpreted by me.  An EKG was performed and the EKG was interpreted by supervising attending.  CT scan was performed in the emergency department and the CT scan radiology impression was interpreted by me.    ECG 12 Lead Altered Mental Status   Preliminary Result   HEART RATE=89  bpm   RR Maoucozn=627  ms   MS Igjoaibi=040  ms   P Horizontal Axis=28  deg   P Front Axis=61  deg   QRSD Interval=83  ms   QT Vutubgxx=412  ms   NOyG=791  ms   QRS Axis=71  deg   T Wave Axis=58  deg   - ABNORMAL ECG -   Sinus rhythm   Supraventricular bigeminy   Borderline prolonged MS interval   Borderline T wave abnormalities   Date and Time of Study:2025-06-13 20:47:48          Procedures    MDM     Amount and/or  Complexity of Data Reviewed  Clinical lab tests: reviewed and ordered  Tests in the radiology section of CPT®: reviewed and ordered  Decide to obtain previous medical records or to obtain history from someone other than the patient: yes    Risk of Complications, Morbidity, and/or Mortality  Presenting problems: moderate  Diagnostic procedures: moderate  Management options: moderate    Patient presents to the emergency department for evaluation of generalized bodyaches.  Patient came in via EMS.  Per report, patient states that she does not feel good and is hurting everywhere.  States that she cannot pinpoint where exactly.  Denies any nausea or vomiting.  History of hypertension, diabetes, hyperlipidemia, PTSD.   is in the room. Initially, patient was thought to be altered but she is Aox4. She is answering questions appropriately.     CBC shows a mildly elevated white count of 12.12.  Rest of the CBC is unremarkable.    The patient´s CMP that was reviewed and interpretted by me shows no abnormalities of critical concern. Of note, the patient´s sodium and potassium are acceptable. The patient´s liver enzymes are unremarkable. The patient´s renal function (creatinine) is preserved. The patient has a normal anion gap.    Magnesium 1.7.    Acetone negative.  Osmolality 291.  .    UA is negative for any leukocytes, nitrites, and bacteria.    Troponin 20, delta troponin negative    CT Abdomen Pelvis With Contrast   Final Result   Age-indeterminate (but probably acute-to-subacute) bilateral   pyelonephritis is suggested without a definite intrarenal abscess. No   perinephric fluid collection is seen. No definite ureteritis is   appreciated. No obstructing ureteral calculus is identified. Otherwise,   no significant acute findings are seen. Please see above comments for   further detail.               Portions of this note were completed with a voice recognition program.       6/14/2025 1:04 AM by Carlos  MD Arianna on Workstation: [id number]          XR Chest 1 View   Final Result   Impression:   No active cardiopulmonary disease is seen.         Electronically Signed: Cordell Perea MD     6/13/2025 8:57 PM EDT     Workstation ID: [id number]        Discussed the imaging findings with the patient.    Discussed this patient with Dr. Jiménez who is okay with dc as long as pain is controlled.    Initially, I thought that pain is related to possible DKA or HHS due to elevated BP, low bicarb and elevated anion gap. VBG is negative for any low pH. Patient has no nausea or vomiting. We reviewed the patient's DAJUAN which was positive for Gabapentin, Clonazepam, and Norco. Patient should be due for a refill. Patient may be having withdrawal symptoms from not having her gabapentin and norco.     After giving morphine, patient states that her pain is controlled. She does state that she feels safe to go home. UA is negative for bacteria, no abx needed for now. Will discharge the patient to .            Patient Care Considerations:    SEPSIS was considered but is NOT present in the emergency department as SIRS criteria is not present.      Consultants/Shared Management Plan:    None    Social Determinants of Health:    Patient has presented with family members who are responsible, reliable and will ensure follow up care.      Disposition and Care Coordination:    Discharged: The patient is suitable and stable for discharge with no need for consideration of admission.    I have explained the patient´s condition, diagnoses and treatment plan based on the information available to me at this time. I have answered questions and addressed any concerns. The patient has a good  understanding of the patient´s diagnosis, condition, and treatment plan as can be expected at this point. The vital signs have been stable. The patient´s condition is stable and appropriate for discharge from the emergency department.      The patient will  pursue further outpatient evaluation with the primary care physician or other designated or consulting physician as outlined in the discharge instructions. They are agreeable to this plan of care and follow-up instructions have been explained in detail. The patient has received these instructions in written format and has expressed an understanding of the discharge instructions. The patient is aware that any significant change in condition or worsening of symptoms should prompt an immediate return to this or the closest emergency department or call to 911.  I have explained discharge medications and the need for follow up with the patient/caretakers. This was also printed in the discharge instructions. Patient was discharged with the following medications and follow up:      Medication List      No changes were made to your prescriptions during this visit.      Javon Pascal MD  [address] [address] [phone]    Schedule an appointment as soon as possible for a visit          Final diagnoses:   Generalized abdominal pain        ED Disposition       ED Disposition   Discharge    Condition   Stable    Comment   --               This medical record created using voice recognition software.             Carlos Gregory PA  06/14/25 0203

## 2025-06-25 LAB
QT INTERVAL: 380 MS
QTC INTERVAL: 464 MS

## (undated) DEVICE — CVR LEG BOOTLEG F/R NOSKID 33IN

## (undated) DEVICE — DRAPE,TOWEL,LARGE,INVISISHIELD: Brand: MEDLINE

## (undated) DEVICE — BNDG ELAS MATRX V/CLS 6INX10YD LF

## (undated) DEVICE — SUT VIC 2/0 CT1 36IN

## (undated) DEVICE — SOL IRR NACL 0.9PCT 3000ML

## (undated) DEVICE — PROXIMATE RH ROTATING HEAD SKIN STAPLERS (35 WIDE) CONTAINS 35 STAINLESS STEEL STAPLES: Brand: PROXIMATE

## (undated) DEVICE — ANTIBACTERIAL VIOLET BRAIDED (POLYGLACTIN 910), SYNTHETIC ABSORBABLE SURGICAL SUTURE: Brand: COATED VICRYL

## (undated) DEVICE — Device: Brand: DEFENDO AIR/WATER/SUCTION AND BIOPSY VALVE

## (undated) DEVICE — SLV SCD KN/LEN ADJ EXPRSS BLENDED MD 1P/U

## (undated) DEVICE — GLOVE,SURG,SENSICARE SLT,LF,PF,8.5: Brand: MEDLINE

## (undated) DEVICE — STRYKER PERFORMANCE SERIES SAGITTAL BLADE: Brand: STRYKER PERFORMANCE SERIES

## (undated) DEVICE — MAT FLR ABS W/BLU/LINER 56X72IN WHT

## (undated) DEVICE — SNAR E/S POLYP SNAREMASTER OVL/10MM 2.8X2300MM YEL

## (undated) DEVICE — TOWEL,OR,DSP,ST,BLUE,STD,4/PK,20PK/CS: Brand: MEDLINE

## (undated) DEVICE — NEEDLE,18GX1.5",REG,BEVEL: Brand: MEDLINE

## (undated) DEVICE — DISPOSABLE TOURNIQUET CUFF 30"X4", 1-LINE, WHITE, STERILE, 1EA/PK, 10PK/CS: Brand: ASP MEDICAL

## (undated) DEVICE — Device: Brand: PULSAVAC®

## (undated) DEVICE — GLV SURG SENSICARE PI PF LF 7 GRN STRL

## (undated) DEVICE — IMPLANTABLE DEVICE: Type: IMPLANTABLE DEVICE | Site: KNEE | Status: NON-FUNCTIONAL

## (undated) DEVICE — 3 BONE CEMENT MIXER: Brand: MIXEVAC

## (undated) DEVICE — APPL CHLORAPREP HI/LITE 26ML ORNG

## (undated) DEVICE — INTENDED FOR TISSUE SEPARATION, AND OTHER PROCEDURES THAT REQUIRE A SHARP SURGICAL BLADE TO PUNCTURE OR CUT.: Brand: BARD-PARKER ® CARBON RIB-BACK BLADES

## (undated) DEVICE — UNDERCAST PADDING: Brand: DEROYAL

## (undated) DEVICE — CONN JET HYDRA H20 AUXILIARY DISP

## (undated) DEVICE — GLV SURG BIOGEL LTX PF 8 1/2

## (undated) DEVICE — GLOVE,SURG,SENSICARE SLT,LF,PF,7: Brand: MEDLINE

## (undated) DEVICE — Device

## (undated) DEVICE — GAUZE,SPONGE,4"X4",16PLY,STRL,LF,10/TRAY: Brand: MEDLINE

## (undated) DEVICE — PULLOVER TOGA, 2X LARGE: Brand: FLYTE, SURGICOOL

## (undated) DEVICE — LINER SURG CANSTR SXN S/RIGD 1500CC

## (undated) DEVICE — TOTAL KNEE-LF: Brand: MEDLINE INDUSTRIES, INC.

## (undated) DEVICE — SOLIDIFIER LIQLOC PLS 1500CC BT

## (undated) DEVICE — SYR LUERLOK 30CC

## (undated) DEVICE — NO-SCRATCH ™ SMALL WHITNEY CURETTE ™ IS A SINGLE-USE, PLASTIC CURETTE FOR QUICKLY APPLYING, MANIPULATING AND REMOVING BONE CEMENT DURING HIP AND KNEE REPLACEMENT SURGERY. THE PLASTIC IS SOFTER THAN STEEL INSTRUMENTS, REDUCING THE RISK OF DAMAGING THE PROSTHESIS WITH METAL INSTRUMENTS.  THE CURETTE’S 6MM TIP REMOVES EXCESS CEMENT FROM REPLACEMENT HIPS AND KNEES. EASY-TO-MANEUVER, THE SMALL BLUE CURETTE LETS YOU REMOVE CEMENT FROM ALL EDGES OF THE PROSTHESIS.NO-SCRATCH WHITNEY SMALL CURETTE FEATURES:SAFER THAN STEEL- MADE OF PLASTIC - STURDY YET SOFTER THAN SURGICAL STEEL.HANDIER- EACH TOOL HAS A MOLDED-IN THUMB INDENTATION INSTANTLY ORIENTING THE TOOL.- EASIER TO MANEUVER IN HARD TO SEE PLACES.- COLOR-CODED FOR EASY IDENTIFICATION.FASTER- COMES INDIVIDUALLY PACKAGED IN STERILE, PEEL OPEN POUCH, READY TO GO.- APPLIES, MANIPULATES, OR REMOVES CEMENT WITH FINGERTIP PRECISION.ECONOMICAL- THE COST OF A SINGLE REVISION DWARFS THE COST OF A SINGLE-USE CURETTE. - DISPOSABLE – THERE’S NO NEED TO WASTE TIME REMOVING HARDENED CEMENT OR RE-STERILIZING TOOLS.- LESS EXPENSIVE TO BUY AND INVENTORY - ORDER ONLY THE TOOL YOU USE.- PACKAGED 25 INDIVIDUALLY WRAPPED TOOLS TO A CARTON FOR CONVENIENT SHELF STORAGE.: Brand: WHITNEY NO-SCRATCH CURETTE (SMALL)

## (undated) DEVICE — BLCK/BITE BLOX WO/DENTL/RIM W/STRAP 54F

## (undated) DEVICE — THE SINGLE USE ETRAP – POLYP TRAP IS USED FOR SUCTION RETRIEVAL OF ENDOSCOPICALLY REMOVED POLYPS.: Brand: ETRAP

## (undated) DEVICE — BASIC SINGLE BASIN-LF: Brand: MEDLINE INDUSTRIES, INC.

## (undated) DEVICE — SINGLE-USE BIOPSY FORCEPS: Brand: RADIAL JAW 4

## (undated) DEVICE — SOL IRRG H2O PL/BG 1000ML STRL

## (undated) DEVICE — GLOVE,SURG,SENSICARE SLT,LF,PF,6.5: Brand: MEDLINE

## (undated) DEVICE — PLASMABLADE PS200-040 4.0: Brand: PLASMABLADE™